# Patient Record
Sex: MALE | Race: WHITE | Employment: OTHER | URBAN - METROPOLITAN AREA
[De-identification: names, ages, dates, MRNs, and addresses within clinical notes are randomized per-mention and may not be internally consistent; named-entity substitution may affect disease eponyms.]

---

## 2019-02-09 PROBLEM — J44.9 COPD (CHRONIC OBSTRUCTIVE PULMONARY DISEASE) (HCC): Status: ACTIVE | Noted: 2019-02-09

## 2019-02-09 PROBLEM — M54.16 LUMBAR RADICULOPATHY: Status: ACTIVE | Noted: 2019-02-09

## 2019-03-11 PROBLEM — B19.20 HEPATITIS C WITHOUT HEPATIC COMA: Status: ACTIVE | Noted: 2019-03-11

## 2021-04-29 ENCOUNTER — TELEPHONE (OUTPATIENT)
Dept: ENT CLINIC | Age: 66
End: 2021-04-29

## 2021-04-29 NOTE — TELEPHONE ENCOUNTER
Patient was initially referred to Dr Francisca Tolbert by Dr Ally Briceno for an oral lesion but was unable to keep the appointment with them bc of transportation issues. He then spoke with his PCP Dr Lawrence Kirkpatrick and they advised him to contact Dr Donohue's office to schedule. Patient has oral lesion and is a current every day smoker. I scheduled first available but patient is requesting to be seen sooner. Please follow up with patient if his appointment can be moved. He did state that he had to go to store to purchase minutes for his phone so if he is unable to be reached please keep attempting.

## 2021-05-26 ENCOUNTER — OFFICE VISIT (OUTPATIENT)
Dept: ENT CLINIC | Age: 66
End: 2021-05-26
Payer: MEDICARE

## 2021-05-26 VITALS
DIASTOLIC BLOOD PRESSURE: 76 MMHG | HEIGHT: 67 IN | HEART RATE: 71 BPM | SYSTOLIC BLOOD PRESSURE: 134 MMHG | BODY MASS INDEX: 30.61 KG/M2 | WEIGHT: 195 LBS

## 2021-05-26 DIAGNOSIS — J38.3 LESION OF TRUE VOCAL CORD: ICD-10-CM

## 2021-05-26 DIAGNOSIS — K14.8 TONGUE LESION: Primary | ICD-10-CM

## 2021-05-26 PROCEDURE — 31575 DIAGNOSTIC LARYNGOSCOPY: CPT | Performed by: OTOLARYNGOLOGY

## 2021-05-26 PROCEDURE — 99205 OFFICE O/P NEW HI 60 MIN: CPT | Performed by: OTOLARYNGOLOGY

## 2021-05-26 ASSESSMENT — ENCOUNTER SYMPTOMS
SORE THROAT: 1
COLOR CHANGE: 0
GASTROINTESTINAL NEGATIVE: 1
EYES NEGATIVE: 1
EYE DISCHARGE: 0
EYE PAIN: 0
APNEA: 0
SHORTNESS OF BREATH: 0
ABDOMINAL PAIN: 0
VOMITING: 0
CHEST TIGHTNESS: 0
RESPIRATORY NEGATIVE: 1
DIARRHEA: 0

## 2021-05-26 NOTE — PROGRESS NOTES
Subjective:      Patient ID:  Heaven Lynn is a 72 y.o. male. HPI:    Patient presents today for left tongue lesion. Condition has been present for 1 year(s). Pt states all problems are on the left. He was found to have a swollen lymph node on the mandible on that side by Dr. Chtio Obrien. Pt has had 3 teeth removed and ABx but the lymph node is not resolved. No past medical history on file. No past surgical history on file. No family history on file. Social History     Socioeconomic History    Marital status: Single     Spouse name: None    Number of children: None    Years of education: None    Highest education level: None   Occupational History    None   Tobacco Use    Smoking status: Current Every Day Smoker     Packs/day: 0.50     Years: 45.00     Pack years: 22.50    Smokeless tobacco: Never Used   Substance and Sexual Activity    Alcohol use: None    Drug use: None    Sexual activity: None   Other Topics Concern    None   Social History Narrative    None     Social Determinants of Health     Financial Resource Strain:     Difficulty of Paying Living Expenses:    Food Insecurity:     Worried About Running Out of Food in the Last Year:     Ran Out of Food in the Last Year:    Transportation Needs:     Lack of Transportation (Medical):  Lack of Transportation (Non-Medical):    Physical Activity:     Days of Exercise per Week:     Minutes of Exercise per Session:    Stress:     Feeling of Stress :    Social Connections:     Frequency of Communication with Friends and Family:     Frequency of Social Gatherings with Friends and Family:     Attends Congregation Services:     Active Member of Clubs or Organizations:     Attends Club or Organization Meetings:     Marital Status:    Intimate Partner Violence:     Fear of Current or Ex-Partner:     Emotionally Abused:     Physically Abused:     Sexually Abused:       Allergies   Allergen Reactions    No Known Allergies Review of Systems   Constitutional: Negative. Negative for appetite change. HENT: Positive for mouth sores and sore throat. Eyes: Negative. Negative for pain, discharge and visual disturbance. Respiratory: Negative. Negative for apnea, chest tightness and shortness of breath. Cardiovascular: Negative. Negative for chest pain, palpitations and leg swelling. Gastrointestinal: Negative. Negative for abdominal pain, diarrhea and vomiting. Endocrine: Negative for cold intolerance, heat intolerance and polydipsia. Genitourinary: Negative. Negative for dysuria, flank pain and hematuria. Musculoskeletal: Negative. Negative for arthralgias, gait problem and neck pain. Skin: Negative. Negative for color change, pallor and rash. Allergic/Immunologic: Negative for environmental allergies, food allergies and immunocompromised state. Neurological: Negative. Negative for dizziness, numbness and headaches. Hematological: Positive for adenopathy. Psychiatric/Behavioral: Negative. Negative for behavioral problems and hallucinations. All other systems reviewed and are negative. Objective:     Vitals:    05/26/21 1559   BP: 134/76   Pulse: 71     Physical Exam  Vitals and nursing note reviewed. Constitutional:       Appearance: He is well-developed. HENT:      Head: Normocephalic and atraumatic. Right Ear: Hearing, tympanic membrane, ear canal and external ear normal.      Left Ear: Hearing, tympanic membrane, ear canal and external ear normal.      Nose: Septal deviation present. Comments: Severe DNS right 90%    Level ii lymphadenopathy left neck     Mouth/Throat:      Lips: Pink. Mouth: Mucous membranes are moist.        Comments: 2.5cm hard immobile area of the tongue, not exophytic    Pt has limited opening of the mandible 3-4 mm    Eyes:      Conjunctiva/sclera: Conjunctivae normal.      Pupils: Pupils are equal, round, and reactive to light. Cardiovascular:      Rate and Rhythm: Normal rate and regular rhythm. Heart sounds: Normal heart sounds. Pulmonary:      Effort: Pulmonary effort is normal.      Breath sounds: Normal breath sounds. Abdominal:      General: Bowel sounds are normal.      Palpations: Abdomen is soft. Musculoskeletal:      Cervical back: Normal range of motion and neck supple. Skin:     General: Skin is warm and dry. Neurological:      Mental Status: He is alert and oriented to person, place, and time. Endoscopy Procedure Note    Pre-operative Diagnosis: hoarseness and tobacco abuse    Post-operative Diagnosis: same    Indications: Hoarseness, dysphagia or aspiration - not able to be clearly evaluated by indirect laryngoscopy    Anesthesia: Lidocaine 2% and Vincenzo-Synephrine 1/2%    Endoscopy Type:  nasal endoscopy, nasopharyngoscopy, laryngoscopy    Procedure Details   With the patient sitting upright in the examining chair informed consent was obtained. The bilateral side(s) of the nose were topically anesthetized with spray. After waiting an appropriate period of time for anesthesia/ vasoconstriction to become effective, the 0-degree  flexible laryngoscope was passed through the left side(s) of the nose, and the nose, nasopharynx, oropharynx, hypopharynx and larynx were examined. An identical procedure was performed on the contralateral side. Examination was performed during quiet respiration and with phonation. I was present for the entire procedure. The following findings were noted. Findings:  Mucosa:  pale and boggy and erythematous   Nasal septum:  deviated to right   Turbinates:  pale, swollen, edematous   Adenoid:  normal   Eustachian tubes:  normal   Mucous stranding:  present   Lesions:  present   Modified Valladares's Maneuver not indicated   Larynx Lesion of left true vocal cord noted.        Condition:  Stable    Complications:  None    Pictures:                                    Assessment: Diagnosis Orders   1. Tongue lesion  CT SOFT TISSUE NECK W WO CONTRAST    BUN & Creatinine   2. Lesion of true vocal cord                Plan:    CT neck today. I recommend:    Panendoscopy, with esophagoscopy, bronchoscopy and direct laryngoscopy, microsuspension with biposy of tongue base and left vocal cord. ; Frozen section    The procedure risks and benefits were discussed with the patient and family. Pt and family understood and decided to proceed with the surgery. Surgical risks include:     -- Tearing of tissues is the most common problem. This is most common in esophagoscopy using a rigid scope. Tearing of the mucosa of the esophagus or hypopharynx can cause mediastinitus or a severe infection in the chest. Injury to the vocal cords can occur with laryngosopy as can injury to the lungs during bronchoscopy. Lung injury can cause air to leak into the chest cavity. If severe it can fill the chest cavity and compress the lungs producing a tension pneumothorax. Insertion of a chest tube would be emergently required to treat the patient.    - injury to lips, teeth or tongue         Follow up 1 week after surgery

## 2021-05-26 NOTE — PATIENT INSTRUCTIONS
Thank you for choosing our [facility]. We are committed to your medical treatment and  care. If you need to reschedule or cancel your surgery or follow up  appointment, please call the surgery scheduler at [phone]. INSTRUCTIONS FOR SURGERY Panendoscopy,Esophagoscopy,Bronchoscopy,Microsuspension,biopsy tongue base left vocal cord    Nothing to eat or drink after midnight the night before surgery unless surgery is at [facility] or otherwise instructed by the hospital.    DO NOT TAKE ANY ASPIRIN PRODUCTS 7 days prior to surgery-unless required by your cardiologist or primary care physician. Tylenol only. No Advil, Motrin, Aleve, or Ibuprofen    Any illegal drugs in your system (including Marijuana even if legally prescribed) will result in your surgery being cancelled. Please be sure to check with our office or the hospital on time frame for the drugs to be out of your system. Should your insurance change at any time you must contact our office. Failure to do so may result in your surgery being rescheduled. If you need paperwork filled out for work, you must give the office 2 weeks to complete and submit the forms.       [address], [address] [facility] [address] [place] [address] will call you a couple days prior to your surgery and give you further instructions, if any questions call them at [phone]

## 2021-05-28 ENCOUNTER — TELEPHONE (OUTPATIENT)
Dept: ENT CLINIC | Age: 66
End: 2021-05-28

## 2021-05-28 NOTE — TELEPHONE ENCOUNTER
[facility] to authorize order with patient insurance. Patient is scheduled for neck CT with radiology on 6/8/21 @ 3:00pm. Patient has been notified of date and time and that they need to arrive at 2:30pm. Patient was informed he needs to be NPO 4 hours prior to procedure and needs have his labs drawn prior to that day. Patient instructed to park in [facility] parking lot and report to registration. Patient verbalized understanding.     Electronically signed by Rita Mota on 5/28/21 at 10:28 AM EDT

## 2021-06-07 ENCOUNTER — TELEPHONE (OUTPATIENT)
Dept: ADMINISTRATIVE | Age: 66
End: 2021-06-07

## 2021-06-07 NOTE — TELEPHONE ENCOUNTER
pt needs to speak with office, unable to make his surgery appt due to transportation and wants to make it for another day

## 2021-06-07 NOTE — TELEPHONE ENCOUNTER
Called patient in regards to scheduling surgery, patient states due to transportation unable to keep surgery date.

## 2021-06-10 ENCOUNTER — TELEPHONE (OUTPATIENT)
Dept: ENT CLINIC | Age: 66
End: 2021-06-10

## 2021-06-10 NOTE — TELEPHONE ENCOUNTER
PIO BOSWELL for patient due to no-show to scheduled neck CT scan appointment 6/8/21. Patient still has follow up with Dr. Lucero Woods scheduled for 6/24/21. Return call requested. Radiology scheduling's phone number given to patient.     Electronically signed by Rita Kramer on 6/10/21 at 10:27 AM EDT

## 2021-06-17 ENCOUNTER — TELEPHONE (OUTPATIENT)
Dept: ENT CLINIC | Age: 66
End: 2021-06-17

## 2021-06-17 ENCOUNTER — TELEPHONE (OUTPATIENT)
Dept: ADMINISTRATIVE | Age: 66
End: 2021-06-17

## 2021-06-17 NOTE — TELEPHONE ENCOUNTER
MA spoke with patient. Follow up with Dr. Mirta Correa has been rescheduled to 7/6/21.     Electronically signed by Romana Goodwill, [address] [place], [place] [address] on 6/17/21 at 11:20 AM EDT

## 2021-06-17 NOTE — TELEPHONE ENCOUNTER
Patient needs to r/s appointment with Alex Joe for CT results as he will be getting his CT on 6/27.  Please advise for scheduling,

## 2021-06-17 NOTE — TELEPHONE ENCOUNTER
MA spoke with patient regarding CT scan appointment that he no-showed to. Patient states his phone doesn't show him all the voicemails left and that's why he wasn't aware of his neck CT appt 6/8/21. I gave patient radiology scheduling's phone number to reschedule his CT scan and told him to call office back if he is unable to get his appointment scheduled before his follow up appointment with Dr. Webber Fret 6/24/21. Patient gave verbal understanding.      Electronically signed by Angeli Hearn [address] [place] [address] [place] on 6/17/21 at 10:37 AM EDT

## 2021-06-26 ENCOUNTER — HOSPITAL ENCOUNTER (OUTPATIENT)
Age: 66
Discharge: HOME | End: 2021-06-26
Payer: MEDICARE

## 2021-06-26 DIAGNOSIS — K14.8 TONGUE LESION: ICD-10-CM

## 2021-06-26 LAB
BUN BLDV-MCNC: 12 MG/DL (ref 6–23)
CREAT SERPL-MCNC: 0.9 MG/DL (ref 0.7–1.2)
GFR AFRICAN AMERICAN: >60
GFR NON-AFRICAN AMERICAN: >60 ML/MIN/1.73

## 2021-06-26 PROCEDURE — 84520 ASSAY OF UREA NITROGEN: CPT

## 2021-06-26 PROCEDURE — 36415 COLL VENOUS BLD VENIPUNCTURE: CPT

## 2021-06-26 PROCEDURE — 82565 ASSAY OF CREATININE: CPT

## 2021-06-27 ENCOUNTER — HOSPITAL ENCOUNTER (OUTPATIENT)
Dept: CT IMAGING | Age: 66
Discharge: HOME | End: 2021-06-29
Payer: MEDICARE

## 2021-06-27 DIAGNOSIS — K14.8 TONGUE LESION: ICD-10-CM

## 2021-06-27 PROCEDURE — 70492 CT SFT TSUE NCK W/O & W/DYE: CPT

## 2021-06-27 PROCEDURE — 6360000004 HC RX CONTRAST MEDICATION: Performed by: RADIOLOGY

## 2021-06-27 RX ADMIN — IOPAMIDOL 75 ML: 755 INJECTION, SOLUTION INTRAVENOUS at 07:53

## 2021-07-06 ENCOUNTER — TELEPHONE (OUTPATIENT)
Dept: ENT CLINIC | Age: 66
End: 2021-07-06

## 2021-07-06 NOTE — TELEPHONE ENCOUNTER
Patient was unable to make the appointment with Dr Cazares Links today. He is requesting to reschedule his appointment for CT results. No soon appointment available. Please contact patient to reschedule.

## 2021-07-06 NOTE — TELEPHONE ENCOUNTER
Spoke with patient, scheduled for 7/13/21 at 10:00 am with Dr. Vielka Adams.      Electronically signed by Henrik Moreira MA on 7/6/21 at 9:19 AM EDT

## 2021-07-06 NOTE — TELEPHONE ENCOUNTER
Pt returned call, stated phone call had been dropped while discussing r/s today's appt. No new appt noted.  Please call pt back with appt info [phone]

## 2021-07-07 ENCOUNTER — TELEPHONE (OUTPATIENT)
Dept: ENT CLINIC | Age: 66
End: 2021-07-07

## 2021-07-07 NOTE — TELEPHONE ENCOUNTER
LVM for patient to see if he would like to move appointment up to 7/9/21 at 8:15 am with Dr. Fabien Ji instead of his scheduled appointment on 7/13/21.     Electronically signed by Lalo Tom MA on 7/7/21 at 2:23 PM EDT

## 2021-07-09 ENCOUNTER — OFFICE VISIT (OUTPATIENT)
Dept: ENT CLINIC | Age: 66
End: 2021-07-09
Payer: MEDICARE

## 2021-07-09 VITALS
SYSTOLIC BLOOD PRESSURE: 153 MMHG | DIASTOLIC BLOOD PRESSURE: 88 MMHG | WEIGHT: 195 LBS | HEIGHT: 67 IN | HEART RATE: 48 BPM | BODY MASS INDEX: 30.61 KG/M2

## 2021-07-09 DIAGNOSIS — G89.3 PAIN DUE TO NEOPLASM: ICD-10-CM

## 2021-07-09 DIAGNOSIS — J38.3 LESION OF TRUE VOCAL CORD: ICD-10-CM

## 2021-07-09 DIAGNOSIS — K14.8 TONGUE LESION: Primary | ICD-10-CM

## 2021-07-09 PROCEDURE — 99213 OFFICE O/P EST LOW 20 MIN: CPT | Performed by: OTOLARYNGOLOGY

## 2021-07-09 RX ORDER — OXYCODONE AND ACETAMINOPHEN 7.5; 325 MG/1; MG/1
1 TABLET ORAL EVERY 6 HOURS PRN
Qty: 30 TABLET | Refills: 0 | Status: SHIPPED | OUTPATIENT
Start: 2021-07-09 | End: 2021-07-23

## 2021-07-09 RX ORDER — IBUPROFEN 600 MG/1
TABLET ORAL
COMMUNITY
Start: 2021-07-08 | End: 2021-10-28

## 2021-07-09 ASSESSMENT — ENCOUNTER SYMPTOMS
RESPIRATORY NEGATIVE: 1
EYE DISCHARGE: 0
EYE PAIN: 0
EYES NEGATIVE: 1
DIARRHEA: 0
COLOR CHANGE: 0
VOMITING: 0
SORE THROAT: 1
APNEA: 0
SHORTNESS OF BREATH: 0
GASTROINTESTINAL NEGATIVE: 1
CHEST TIGHTNESS: 0
ABDOMINAL PAIN: 0

## 2021-07-09 NOTE — PROGRESS NOTES
Subjective:      Patient ID:  Natacha Pemberton is a 72 y.o. male. HPI:    Patient presents today for left tongue lesion. Condition has been present for 2 year(s). Pt states all problems are on the left. He was found to have a swollen lymph node on the mandible on that side by Dr. Eliz Adan. Pt has had 3 teeth removed and ABx but the lymph node is not resolved. He has been loosing weight ~10lbs for the past 1.5 months. Complains of nasal congestion, most of the time. Able to swallow, but endorses odynophagia. Has 45 pack year to tobacco abuse. 1 Large can of beer per day. Left ear discomfort. Hoarse for 2 months. No past medical history on file. No past surgical history on file. No family history on file. Social History     Socioeconomic History    Marital status: Single     Spouse name: None    Number of children: None    Years of education: None    Highest education level: None   Occupational History    None   Tobacco Use    Smoking status: Current Every Day Smoker     Packs/day: 0.50     Years: 45.00     Pack years: 22.50    Smokeless tobacco: Never Used   Substance and Sexual Activity    Alcohol use: None    Drug use: None    Sexual activity: None   Other Topics Concern    None   Social History Narrative    None     Social Determinants of Health     Financial Resource Strain:     Difficulty of Paying Living Expenses:    Food Insecurity:     Worried About Running Out of Food in the Last Year:     Ran Out of Food in the Last Year:    Transportation Needs:     Lack of Transportation (Medical):      Lack of Transportation (Non-Medical):    Physical Activity:     Days of Exercise per Week:     Minutes of Exercise per Session:    Stress:     Feeling of Stress :    Social Connections:     Frequency of Communication with Friends and Family:     Frequency of Social Gatherings with Friends and Family:     Attends Cheondoism Services:     Active Member of Clubs or Organizations:     Attends Club or Organization Meetings:     Marital Status:    Intimate Partner Violence:     Fear of Current or Ex-Partner:     Emotionally Abused:     Physically Abused:     Sexually Abused: Allergies   Allergen Reactions    No Known Allergies        Review of Systems   Constitutional: Negative. Negative for appetite change. HENT: Positive for mouth sores and sore throat. Eyes: Negative. Negative for pain, discharge and visual disturbance. Respiratory: Negative. Negative for apnea, chest tightness and shortness of breath. Cardiovascular: Negative. Negative for chest pain, palpitations and leg swelling. Gastrointestinal: Negative. Negative for abdominal pain, diarrhea and vomiting. Endocrine: Negative for cold intolerance, heat intolerance and polydipsia. Genitourinary: Negative. Negative for dysuria, flank pain and hematuria. Musculoskeletal: Negative. Negative for arthralgias, gait problem and neck pain. Skin: Negative. Negative for color change, pallor and rash. Allergic/Immunologic: Negative for environmental allergies, food allergies and immunocompromised state. Neurological: Negative. Negative for dizziness, numbness and headaches. Hematological: Positive for adenopathy. Psychiatric/Behavioral: Negative. Negative for behavioral problems and hallucinations. All other systems reviewed and are negative. Objective:     Vitals:    07/09/21 0821   BP: (!) 153/88   Pulse: (!) 48     Physical Exam  Vitals and nursing note reviewed. Constitutional:       Appearance: He is well-developed. HENT:      Head: Normocephalic and atraumatic. Right Ear: Hearing, tympanic membrane, ear canal and external ear normal.      Left Ear: Hearing, tympanic membrane, ear canal and external ear normal.      Nose: Septal deviation present. Comments: Severe DNS right 90%    Level ii lymphadenopathy left neck     Mouth/Throat:      Lips: Pink.       Mouth: Mucous membranes are moist.        Comments: 3-4cm hard immobile area of the left posterior lateral tongue, endophytic involving posterior midline of tongue     Pt has limited opening of the mandible 3-4 mm    Eyes:      Conjunctiva/sclera: Conjunctivae normal.      Pupils: Pupils are equal, round, and reactive to light. Neck:     Cardiovascular:      Rate and Rhythm: Normal rate and regular rhythm. Heart sounds: Normal heart sounds. Pulmonary:      Effort: Pulmonary effort is normal.      Breath sounds: Normal breath sounds. Abdominal:      General: Bowel sounds are normal.      Palpations: Abdomen is soft. Musculoskeletal:      Cervical back: Normal range of motion and neck supple. Skin:     General: Skin is warm and dry. Neurological:      Mental Status: He is alert and oriented to person, place, and time. Endoscopy Procedure Note    Pre-operative Diagnosis: hoarseness and tobacco abuse    Post-operative Diagnosis: same    Indications: Hoarseness, dysphagia or aspiration - not able to be clearly evaluated by indirect laryngoscopy    Anesthesia: Lidocaine 2% and Vinecnzo-Synephrine 1/2%    Endoscopy Type:  nasal endoscopy, nasopharyngoscopy, laryngoscopy    Procedure Details   With the patient sitting upright in the examining chair informed consent was obtained. The bilateral side(s) of the nose were topically anesthetized with spray. After waiting an appropriate period of time for anesthesia/ vasoconstriction to become effective, the 0-degree  flexible laryngoscope was passed through the left side(s) of the nose, and the nose, nasopharynx, oropharynx, hypopharynx and larynx were examined. An identical procedure was performed on the contralateral side. Examination was performed during quiet respiration and with phonation. I was present for the entire procedure. The following findings were noted.     Findings:  Mucosa:  pale and boggy and erythematous   Nasal septum:  deviated to right Turbinates:  pale, swollen, edematous   Adenoid:  normal   Eustachian tubes:  normal   Mucous stranding:  present   Lesions:  present   Modified Valladares's Maneuver not indicated   Larynx Lesion of left true vocal cord noted. Condition:  Stable    Complications:  None    Pictures:                                    Assessment:       Diagnosis Orders   1. Tongue lesion     2. 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 Lesion of true vocal cord                Plan:    CT neck reviewed with patient    I recommend:    Panendoscopy, with esophagoscopy, bronchoscopy and direct laryngoscopy, microsuspension with biposy of tongue base and left vocal cord. ; Frozen section    The procedure risks and benefits were discussed with the patient and family. Pt and family understood and decided to proceed with the surgery. Surgical risks include:     -- Tearing of tissues is the most common problem. This is most common in esophagoscopy using a rigid scope. Tearing of the mucosa of the esophagus or hypopharynx can cause mediastinitus or a severe infection in the chest. Injury to the vocal cords can occur with laryngosopy as can injury to the lungs during bronchoscopy. Lung injury can cause air to leak into the chest cavity. If severe it can fill the chest cavity and compress the lungs producing a tension pneumothorax. Insertion of a chest tube would be emergently required to treat the patient. - injury to lips, teeth or tongue         Follow up 1 week after surgery                     Lauri Lin  1955    I have discussed the case, including pertinent history and exam findings with the resident. I have seen and examined the patient and the key elements of the encounter have been performed by me. I agree with the assessment, plan and orders as documented by the  resident              Remainder of medical problems as per  resident note. Patient seen and examined. Agree with above exam, assessment and plan.       Electronically signed by Bentley Llanes DO on 7/11/21 at 12:06 PM EDT

## 2021-07-10 ENCOUNTER — ANESTHESIA EVENT (OUTPATIENT)
Dept: OPERATING ROOM | Age: 66
End: 2021-07-10
Payer: MEDICARE

## 2021-07-11 ASSESSMENT — ENCOUNTER SYMPTOMS
EYE PAIN: 0
RESPIRATORY NEGATIVE: 1
SORE THROAT: 1
SHORTNESS OF BREATH: 0
ABDOMINAL PAIN: 0
CHEST TIGHTNESS: 0
GASTROINTESTINAL NEGATIVE: 1
EYE DISCHARGE: 0
APNEA: 0
EYES NEGATIVE: 1
VOMITING: 0
COLOR CHANGE: 0
DIARRHEA: 0

## 2021-07-11 NOTE — H&P
Subjective:      Patient ID:  Mikhail Wagner is a 72 y.o. male. HPI:    Patient presents today for left tongue lesion. Condition has been present for 2 year(s). Pt states all problems are on the left. He was found to have a swollen lymph node on the mandible on that side by Dr. Dell Tee. Pt has had 3 teeth removed and ABx but the lymph node is not resolved. He has been loosing weight ~10lbs for the past 1.5 months. Complains of nasal congestion, most of the time. Able to swallow, but endorses odynophagia. Has 45 pack year to tobacco abuse. 1 Large can of beer per day. Left ear discomfort. Hoarse for 2 months. Past Medical History:   Diagnosis Date    COPD (chronic obstructive [facility] disease) ([facility]     History of hepatitis C 2019    treated-2019    Hoarseness of voice     For OR 7-12-21    Mouth sores     Tongue mass     left     Past Surgical History:   Procedure Laterality Date    FRACTURE SURGERY      KNEE ARTHROSCOPY Left 1980     History reviewed. No pertinent family history. Social History     Socioeconomic History    Marital status: Single     Spouse name: None    Number of children: None    Years of education: None    Highest education level: None   Occupational History    None   Tobacco Use    Smoking status: Current Every Day Smoker     Packs/day: 0.50     Years: 45.00     Pack years: 22.50     Types: Cigarettes    Smokeless tobacco: Never Used   Substance and Sexual Activity    Alcohol use:  Yes     Alcohol/week: 1.0 standard drinks     Types: 1 Cans of beer per week     Comment: i beer/day    Drug use: Never    Sexual activity: None   Other Topics Concern    None   Social History Narrative    None     Social Determinants of Health     Financial Resource Strain:     Difficulty of Paying Living Expenses:    Food Insecurity:     Worried About Running Out of Food in the Last Year:     Ran Out of Food in the Last Year:    Transportation Needs:     Lack of Transportation (Medical):  Lack of Transportation (Non-Medical):    Physical Activity:     Days of Exercise per Week:     Minutes of Exercise per Session:    Stress:     Feeling of Stress :    Social Connections:     Frequency of Communication with Friends and Family:     Frequency of Social Gatherings with Friends and Family:     Attends Religion Services:     Active Member of Clubs or Organizations:     Attends Club or Organization Meetings:     Marital Status:    Intimate Partner Violence:     Fear of Current or Ex-Partner:     Emotionally Abused:     Physically Abused:     Sexually Abused:      No Known Allergies    Review of Systems   Constitutional: Negative. Negative for appetite change. HENT: Positive for mouth sores and sore throat. Eyes: Negative. Negative for pain, discharge and visual disturbance. Respiratory: Negative. Negative for apnea, chest tightness and shortness of breath. Cardiovascular: Negative. Negative for chest pain, palpitations and leg swelling. Gastrointestinal: Negative. Negative for abdominal pain, diarrhea and vomiting. Endocrine: Negative for cold intolerance, heat intolerance and polydipsia. Genitourinary: Negative. Negative for dysuria, flank pain and hematuria. Musculoskeletal: Negative. Negative for arthralgias, gait problem and neck pain. Skin: Negative. Negative for color change, pallor and rash. Allergic/Immunologic: Negative for environmental allergies, food allergies and immunocompromised state. Neurological: Negative. Negative for dizziness, numbness and headaches. Hematological: Positive for adenopathy. Psychiatric/Behavioral: Negative. Negative for behavioral problems and hallucinations. All other systems reviewed and are negative. Objective: There were no vitals filed for this visit. Physical Exam  Vitals and nursing note reviewed. Constitutional:       Appearance: He is well-developed.    HENT:      Head: hypopharynx and larynx were examined. An identical procedure was performed on the contralateral side. Examination was performed during quiet respiration and with phonation. I was present for the entire procedure. The following findings were noted. Findings:  Mucosa:  pale and boggy and erythematous   Nasal septum:  deviated to right   Turbinates:  pale, swollen, edematous   Adenoid:  normal   Eustachian tubes:  normal   Mucous stranding:  present   Lesions:  present   Modified Valladares's Maneuver not indicated   Larynx Lesion of left true vocal cord noted. Condition:  Stable    Complications:  None    Pictures:                                    Assessment:       Diagnosis Orders   1. Tongue lesion     2. 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 Lesion of true vocal cord                Plan:    CT neck reviewed with patient    I recommend:    Panendoscopy, with esophagoscopy, bronchoscopy and direct laryngoscopy, microsuspension with biposy of tongue base and left vocal cord. ; Frozen section    The procedure risks and benefits were discussed with the patient and family. Pt and family understood and decided to proceed with the surgery. Surgical risks include:     -- Tearing of tissues is the most common problem. This is most common in esophagoscopy using a rigid scope. Tearing of the mucosa of the esophagus or hypopharynx can cause mediastinitus or a severe infection in the chest. Injury to the vocal cords can occur with laryngosopy as can injury to the lungs during bronchoscopy. Lung injury can cause air to leak into the chest cavity. If severe it can fill the chest cavity and compress the lungs producing a tension pneumothorax. Insertion of a chest tube would be emergently required to treat the patient. - injury to lips, teeth or tongue         Follow up 1 week after surgery           Patient was seen and re-examined preoperatively today, July 12 2021. There was no substantial change in her physical and medical status. Patient is fit for the proposed surgical procedure. All questions were appropriately addressed and had no further questions regarding the risks, benefits, and alternatives of the procedure. Patient and family wished to proceed.     Tisha Hairston DO  Resident Physician  [facility]  Otolaryngology Residency

## 2021-07-12 ENCOUNTER — HOSPITAL ENCOUNTER (OUTPATIENT)
Age: 66
Setting detail: OUTPATIENT SURGERY
Discharge: HOME | End: 2021-07-12
Attending: OTOLARYNGOLOGY | Admitting: OTOLARYNGOLOGY
Payer: MEDICARE

## 2021-07-12 ENCOUNTER — ANESTHESIA (OUTPATIENT)
Dept: OPERATING ROOM | Age: 66
End: 2021-07-12
Payer: MEDICARE

## 2021-07-12 VITALS
WEIGHT: 195 LBS | HEART RATE: 74 BPM | OXYGEN SATURATION: 92 % | RESPIRATION RATE: 18 BRPM | DIASTOLIC BLOOD PRESSURE: 97 MMHG | BODY MASS INDEX: 30.61 KG/M2 | SYSTOLIC BLOOD PRESSURE: 171 MMHG | TEMPERATURE: 96.7 F | HEIGHT: 67 IN

## 2021-07-12 VITALS — OXYGEN SATURATION: 95 % | TEMPERATURE: 94.3 F | DIASTOLIC BLOOD PRESSURE: 77 MMHG | SYSTOLIC BLOOD PRESSURE: 161 MMHG

## 2021-07-12 DIAGNOSIS — C02.9 TONGUE CANCER (HCC): Primary | ICD-10-CM

## 2021-07-12 PROBLEM — K14.8 TONGUE MASS: Status: ACTIVE | Noted: 2021-07-12

## 2021-07-12 LAB
EKG ATRIAL RATE: 57 BPM
EKG P AXIS: 15 DEGREES
EKG P-R INTERVAL: 154 MS
EKG Q-T INTERVAL: 416 MS
EKG QRS DURATION: 86 MS
EKG QTC CALCULATION (BAZETT): 404 MS
EKG R AXIS: 43 DEGREES
EKG T AXIS: 67 DEGREES
EKG VENTRICULAR RATE: 57 BPM
SARS-COV-2, NAAT: NOT DETECTED

## 2021-07-12 PROCEDURE — 88342 IMHCHEM/IMCYTCHM 1ST ANTB: CPT

## 2021-07-12 PROCEDURE — 6370000000 HC RX 637 (ALT 250 FOR IP): Performed by: ANESTHESIOLOGY

## 2021-07-12 PROCEDURE — 7100000000 HC PACU RECOVERY - FIRST 15 MIN: Performed by: OTOLARYNGOLOGY

## 2021-07-12 PROCEDURE — 93005 ELECTROCARDIOGRAM TRACING: CPT

## 2021-07-12 PROCEDURE — 88331 PATH CONSLTJ SURG 1 BLK 1SPC: CPT

## 2021-07-12 PROCEDURE — 3600000002 HC SURGERY LEVEL 2 BASE: Performed by: OTOLARYNGOLOGY

## 2021-07-12 PROCEDURE — 3700000000 HC ANESTHESIA ATTENDED CARE: Performed by: OTOLARYNGOLOGY

## 2021-07-12 PROCEDURE — 31535 LARYNGOSCOPY W/BIOPSY: CPT | Performed by: OTOLARYNGOLOGY

## 2021-07-12 PROCEDURE — 7100000001 HC PACU RECOVERY - ADDTL 15 MIN: Performed by: OTOLARYNGOLOGY

## 2021-07-12 PROCEDURE — 87635 SARS-COV-2 COVID-19 AMP PRB: CPT

## 2021-07-12 PROCEDURE — 7100000011 HC PHASE II RECOVERY - ADDTL 15 MIN: Performed by: OTOLARYNGOLOGY

## 2021-07-12 PROCEDURE — 31622 DX BRONCHOSCOPE/WASH: CPT | Performed by: OTOLARYNGOLOGY

## 2021-07-12 PROCEDURE — 3700000001 HC ADD 15 MINUTES (ANESTHESIA): Performed by: OTOLARYNGOLOGY

## 2021-07-12 PROCEDURE — 2720000010 HC SURG SUPPLY STERILE: Performed by: OTOLARYNGOLOGY

## 2021-07-12 PROCEDURE — 2580000003 HC RX 258: Performed by: STUDENT IN AN ORGANIZED HEALTH CARE EDUCATION/TRAINING PROGRAM

## 2021-07-12 PROCEDURE — 6360000002 HC RX W HCPCS: Performed by: NURSE ANESTHETIST, CERTIFIED REGISTERED

## 2021-07-12 PROCEDURE — 2500000003 HC RX 250 WO HCPCS: Performed by: NURSE ANESTHETIST, CERTIFIED REGISTERED

## 2021-07-12 PROCEDURE — 43191 ESOPHAGOSCOPY RIGID TRNSO DX: CPT | Performed by: OTOLARYNGOLOGY

## 2021-07-12 PROCEDURE — 2709999900 HC NON-CHARGEABLE SUPPLY: Performed by: OTOLARYNGOLOGY

## 2021-07-12 PROCEDURE — 88360 TUMOR IMMUNOHISTOCHEM/MANUAL: CPT

## 2021-07-12 PROCEDURE — 88305 TISSUE EXAM BY PATHOLOGIST: CPT

## 2021-07-12 PROCEDURE — 3600000012 HC SURGERY LEVEL 2 ADDTL 15MIN: Performed by: OTOLARYNGOLOGY

## 2021-07-12 PROCEDURE — 7100000010 HC PHASE II RECOVERY - FIRST 15 MIN: Performed by: OTOLARYNGOLOGY

## 2021-07-12 RX ORDER — ONDANSETRON 2 MG/ML
INJECTION INTRAMUSCULAR; INTRAVENOUS PRN
Status: DISCONTINUED | OUTPATIENT
Start: 2021-07-12 | End: 2021-07-12 | Stop reason: SDUPTHER

## 2021-07-12 RX ORDER — DEXAMETHASONE SODIUM PHOSPHATE 4 MG/ML
INJECTION, SOLUTION INTRA-ARTICULAR; INTRALESIONAL; INTRAMUSCULAR; INTRAVENOUS; SOFT TISSUE PRN
Status: DISCONTINUED | OUTPATIENT
Start: 2021-07-12 | End: 2021-07-12 | Stop reason: SDUPTHER

## 2021-07-12 RX ORDER — FENTANYL CITRATE 50 UG/ML
50 INJECTION, SOLUTION INTRAMUSCULAR; INTRAVENOUS EVERY 5 MIN PRN
Status: DISCONTINUED | OUTPATIENT
Start: 2021-07-12 | End: 2021-07-12 | Stop reason: HOSPADM

## 2021-07-12 RX ORDER — PROPOFOL 10 MG/ML
INJECTION, EMULSION INTRAVENOUS PRN
Status: DISCONTINUED | OUTPATIENT
Start: 2021-07-12 | End: 2021-07-12 | Stop reason: SDUPTHER

## 2021-07-12 RX ORDER — SUCCINYLCHOLINE/SOD CL,ISO/PF 200MG/10ML
SYRINGE (ML) INTRAVENOUS PRN
Status: DISCONTINUED | OUTPATIENT
Start: 2021-07-12 | End: 2021-07-12 | Stop reason: SDUPTHER

## 2021-07-12 RX ORDER — OXYCODONE HYDROCHLORIDE AND ACETAMINOPHEN 5; 325 MG/1; MG/1
1 TABLET ORAL
Status: COMPLETED | OUTPATIENT
Start: 2021-07-12 | End: 2021-07-12

## 2021-07-12 RX ORDER — SODIUM CHLORIDE 9 MG/ML
25 INJECTION, SOLUTION INTRAVENOUS PRN
Status: CANCELLED | OUTPATIENT
Start: 2021-07-12

## 2021-07-12 RX ORDER — SODIUM CHLORIDE 0.9 % (FLUSH) 0.9 %
10 SYRINGE (ML) INJECTION EVERY 12 HOURS SCHEDULED
Status: DISCONTINUED | OUTPATIENT
Start: 2021-07-12 | End: 2021-07-12 | Stop reason: HOSPADM

## 2021-07-12 RX ORDER — SODIUM CHLORIDE 0.9 % (FLUSH) 0.9 %
5-40 SYRINGE (ML) INJECTION EVERY 12 HOURS SCHEDULED
Status: CANCELLED | OUTPATIENT
Start: 2021-07-12

## 2021-07-12 RX ORDER — SODIUM CHLORIDE 0.9 % (FLUSH) 0.9 %
10 SYRINGE (ML) INJECTION PRN
Status: DISCONTINUED | OUTPATIENT
Start: 2021-07-12 | End: 2021-07-12 | Stop reason: HOSPADM

## 2021-07-12 RX ORDER — PROMETHAZINE HYDROCHLORIDE 25 MG/ML
6.25 INJECTION, SOLUTION INTRAMUSCULAR; INTRAVENOUS
Status: DISCONTINUED | OUTPATIENT
Start: 2021-07-12 | End: 2021-07-12 | Stop reason: HOSPADM

## 2021-07-12 RX ORDER — MEPERIDINE HYDROCHLORIDE 25 MG/ML
12.5 INJECTION INTRAMUSCULAR; INTRAVENOUS; SUBCUTANEOUS EVERY 5 MIN PRN
Status: DISCONTINUED | OUTPATIENT
Start: 2021-07-12 | End: 2021-07-12 | Stop reason: HOSPADM

## 2021-07-12 RX ORDER — LABETALOL HYDROCHLORIDE 5 MG/ML
INJECTION, SOLUTION INTRAVENOUS PRN
Status: DISCONTINUED | OUTPATIENT
Start: 2021-07-12 | End: 2021-07-12 | Stop reason: SDUPTHER

## 2021-07-12 RX ORDER — FENTANYL CITRATE 50 UG/ML
25 INJECTION, SOLUTION INTRAMUSCULAR; INTRAVENOUS EVERY 5 MIN PRN
Status: DISCONTINUED | OUTPATIENT
Start: 2021-07-12 | End: 2021-07-12 | Stop reason: HOSPADM

## 2021-07-12 RX ORDER — SODIUM CHLORIDE 9 MG/ML
25 INJECTION, SOLUTION INTRAVENOUS PRN
Status: DISCONTINUED | OUTPATIENT
Start: 2021-07-12 | End: 2021-07-12 | Stop reason: HOSPADM

## 2021-07-12 RX ORDER — ROCURONIUM BROMIDE 10 MG/ML
INJECTION, SOLUTION INTRAVENOUS PRN
Status: DISCONTINUED | OUTPATIENT
Start: 2021-07-12 | End: 2021-07-12 | Stop reason: SDUPTHER

## 2021-07-12 RX ORDER — LIDOCAINE HYDROCHLORIDE 20 MG/ML
INJECTION, SOLUTION EPIDURAL; INFILTRATION; INTRACAUDAL; PERINEURAL PRN
Status: DISCONTINUED | OUTPATIENT
Start: 2021-07-12 | End: 2021-07-12 | Stop reason: SDUPTHER

## 2021-07-12 RX ORDER — SODIUM CHLORIDE 9 MG/ML
INJECTION, SOLUTION INTRAVENOUS CONTINUOUS
Status: CANCELLED | OUTPATIENT
Start: 2021-07-12

## 2021-07-12 RX ORDER — SODIUM CHLORIDE 0.9 % (FLUSH) 0.9 %
5-40 SYRINGE (ML) INJECTION PRN
Status: CANCELLED | OUTPATIENT
Start: 2021-07-12

## 2021-07-12 RX ORDER — GLYCOPYRROLATE 1 MG/5 ML
SYRINGE (ML) INTRAVENOUS PRN
Status: DISCONTINUED | OUTPATIENT
Start: 2021-07-12 | End: 2021-07-12 | Stop reason: SDUPTHER

## 2021-07-12 RX ORDER — DIPHENHYDRAMINE HYDROCHLORIDE 50 MG/ML
12.5 INJECTION INTRAMUSCULAR; INTRAVENOUS
Status: DISCONTINUED | OUTPATIENT
Start: 2021-07-12 | End: 2021-07-12 | Stop reason: HOSPADM

## 2021-07-12 RX ORDER — CHLORHEXIDINE GLUCONATE 0.12 MG/ML
15 RINSE ORAL 2 TIMES DAILY
Qty: 420 ML | Refills: 0 | Status: SHIPPED | OUTPATIENT
Start: 2021-07-12 | End: 2021-07-23 | Stop reason: SDUPTHER

## 2021-07-12 RX ORDER — FENTANYL CITRATE 50 UG/ML
INJECTION, SOLUTION INTRAMUSCULAR; INTRAVENOUS PRN
Status: DISCONTINUED | OUTPATIENT
Start: 2021-07-12 | End: 2021-07-12 | Stop reason: SDUPTHER

## 2021-07-12 RX ADMIN — PROPOFOL 200 MG: 10 INJECTION, EMULSION INTRAVENOUS at 12:35

## 2021-07-12 RX ADMIN — SUGAMMADEX 350 MG: 100 INJECTION, SOLUTION INTRAVENOUS at 12:58

## 2021-07-12 RX ADMIN — LABETALOL HYDROCHLORIDE 15 MG: 5 INJECTION INTRAVENOUS at 12:48

## 2021-07-12 RX ADMIN — FENTANYL CITRATE 100 MCG: 50 INJECTION, SOLUTION INTRAMUSCULAR; INTRAVENOUS at 12:35

## 2021-07-12 RX ADMIN — LIDOCAINE HYDROCHLORIDE 100 MG: 20 INJECTION, SOLUTION EPIDURAL; INFILTRATION; INTRACAUDAL; PERINEURAL at 12:35

## 2021-07-12 RX ADMIN — SODIUM CHLORIDE: 9 INJECTION, SOLUTION INTRAVENOUS at 11:23

## 2021-07-12 RX ADMIN — LABETALOL HYDROCHLORIDE 10 MG: 5 INJECTION INTRAVENOUS at 12:52

## 2021-07-12 RX ADMIN — OXYCODONE HYDROCHLORIDE AND ACETAMINOPHEN 1 TABLET: 5; 325 TABLET ORAL at 14:05

## 2021-07-12 RX ADMIN — DEXAMETHASONE SODIUM PHOSPHATE 10 MG: 4 INJECTION, SOLUTION INTRAMUSCULAR; INTRAVENOUS at 12:42

## 2021-07-12 RX ADMIN — ONDANSETRON 4 MG: 2 INJECTION INTRAMUSCULAR; INTRAVENOUS at 12:42

## 2021-07-12 RX ADMIN — ROCURONIUM BROMIDE 20 MG: 10 INJECTION, SOLUTION INTRAVENOUS at 12:40

## 2021-07-12 RX ADMIN — Medication 140 MG: at 12:41

## 2021-07-12 RX ADMIN — LABETALOL HYDROCHLORIDE 10 MG: 5 INJECTION INTRAVENOUS at 12:41

## 2021-07-12 RX ADMIN — Medication 0.4 MG: at 12:57

## 2021-07-12 ASSESSMENT — PULMONARY FUNCTION TESTS
PIF_VALUE: 20
PIF_VALUE: 0
PIF_VALUE: 32
PIF_VALUE: 22
PIF_VALUE: 0
PIF_VALUE: 1
PIF_VALUE: 23
PIF_VALUE: 1
PIF_VALUE: 26
PIF_VALUE: 25
PIF_VALUE: 1
PIF_VALUE: 20
PIF_VALUE: 26
PIF_VALUE: 1
PIF_VALUE: 2
PIF_VALUE: 24
PIF_VALUE: 20
PIF_VALUE: 1
PIF_VALUE: 25
PIF_VALUE: 1
PIF_VALUE: 21
PIF_VALUE: 0
PIF_VALUE: 26
PIF_VALUE: 28
PIF_VALUE: 2
PIF_VALUE: 25
PIF_VALUE: 1
PIF_VALUE: 23
PIF_VALUE: 1
PIF_VALUE: 25
PIF_VALUE: 22
PIF_VALUE: 2
PIF_VALUE: 36
PIF_VALUE: 30
PIF_VALUE: 2
PIF_VALUE: 25
PIF_VALUE: 0
PIF_VALUE: 3
PIF_VALUE: 20
PIF_VALUE: 26
PIF_VALUE: 15
PIF_VALUE: 23
PIF_VALUE: 24
PIF_VALUE: 0
PIF_VALUE: 26
PIF_VALUE: 24
PIF_VALUE: 14
PIF_VALUE: 0
PIF_VALUE: 21
PIF_VALUE: 1
PIF_VALUE: 20
PIF_VALUE: 23
PIF_VALUE: 25
PIF_VALUE: 1
PIF_VALUE: 26
PIF_VALUE: 25
PIF_VALUE: 26
PIF_VALUE: 21
PIF_VALUE: 32

## 2021-07-12 ASSESSMENT — LIFESTYLE VARIABLES: SMOKING_STATUS: 1

## 2021-07-12 ASSESSMENT — PAIN SCALES - GENERAL
PAINLEVEL_OUTOF10: 8
PAINLEVEL_OUTOF10: 3

## 2021-07-12 ASSESSMENT — PAIN DESCRIPTION - FREQUENCY: FREQUENCY: CONTINUOUS

## 2021-07-12 ASSESSMENT — PAIN DESCRIPTION - PAIN TYPE
TYPE: ACUTE PAIN

## 2021-07-12 ASSESSMENT — PAIN DESCRIPTION - LOCATION
LOCATION: HEAD

## 2021-07-12 ASSESSMENT — PAIN DESCRIPTION - PROGRESSION
CLINICAL_PROGRESSION: NOT CHANGED
CLINICAL_PROGRESSION: NOT CHANGED

## 2021-07-12 ASSESSMENT — PAIN DESCRIPTION - DESCRIPTORS
DESCRIPTORS: HEADACHE;ACHING;THROBBING
DESCRIPTORS: HEADACHE

## 2021-07-12 NOTE — OP NOTE
7/12/2021  Darlene Colmenares  [id number]      Pre-operative Diagnosis: tongue mass, vocal cord mass      Post-operative Diagnosis: same    Procedure: Direct laryngoscopy, esophagoscopy, bronchoscopy,  microsuspension with biopsy and left true vocal cord    Anesthesia: General endotracheal anesthesia    Surgeons/Assistants: Charanjit    Estimated Blood Loss: less than 50     Complications: None    Specimens: was Obtained    DESCRIPTION OF PROCEDURE: The patient was consented preoperatively, taken   back to the operating room, identified, and appropriately placed in the   supine position. Given anesthesia per general intubation. Once the patient   was intubated, they were turned 90 degrees, and the bed was elevated to my hip   height. A tooth guard was not placed in the patient's   oral cavity to protect his upper teeth. Esophagoscopy  An esophagoscope was then placed in the patient's oral cavity down to his   posterior pharynx. The esophagoscope was placed into the esophageal sphincter   and allowed to go down to the inferior aspect of the esophagus. Upon going   into the esophagus, reflux was seen and suctioned away. The esophagoscope was placed down near the lower esophageal sphincter. Slowly moving back from the most inferior aspect of the esophagus, the esophageal tissue was examined. There was not  abnormal tissue found and a biopsy was not taken. A biopsy was not taken from the upper esophageal sphincter right at the opening of the esophagus. This was sent off for permanent pathology. The esophagoscope was removed. Direct laryngoscopy  A [employer] scope was placed in the patient's oral cavity. All areas of oropharynx and hypopharynx were evaluated starting with the right base of the tongue, left base of tongue right vallecula and left vallecula, then proceeding down to the left piriform sinus and left parapharyngeal space.  The scope was then moved to the right piriform sinus and parapharyngeal area.  The scope was then used to lift the posterior aspect of the epiglottis to evaluate the larynx, bilateral aryepiglottic folds, false vocal cords, anterior commissure, true vocal cords and subglottis. There were abnormalities found. Biopsies were taken of the left base of tongue and left true vocal cords  These were sent off for pathology. Bronchoscopy  A 0 degree rigid bronchoscope was then used to go [facility]   [facility] to evaluate below the subglottis. The patient's breathing tube cuff was deflated and the bronchoscope was inserted past the tube. The ashanti was seen clearly   without any masses. The trachea was evaluated fully, drawing back around the tube and masses were not seen subglottically. Biopsies were not taken. Once this was all accomplished,   some adrenaline pledgets were placed on the area where the biopsies were   taken to calm the bleeding down. These were then removed. The Holinger   scope was removed. The patient was then turned back to Anesthesia for appropriate awakening. The patient's oral cavity was examined after the tooth guard was removed and teeth were all intact.

## 2021-07-12 NOTE — ANESTHESIA POSTPROCEDURE EVALUATION
Department of Anesthesiology  Postprocedure Note    Patient: Hortensia Miller  MRN: [id number]  YOB: 1955  Date of evaluation: 7/12/2021  Time:  6:19 PM     Procedure Summary     Date: 07/12/21 Room / Location: [facility] 01 / 106 [facility]    Anesthesia Start: 1214 Anesthesia Stop: 9677    Procedures:       PANENDOSCOPY BRONCHOSCOPY ESOPHAGOSCOPY MICROSUSPENSION DIRECT LARYNGOSCOPY (N/A Throat)      BIOPSY OF TONGUE BASE WITH FROZEN SECTION AND LEFT TRUE VOCAL CORD BIOPSY (N/A Mouth) Diagnosis: (TONGUE LESION LESION OF TRUE VOCAL CORD)    Surgeons: Kirk Ellison DO Responsible Provider: Margarito Tomas MD    Anesthesia Type: general ASA Status: 3          Anesthesia Type: general    Julian Phase I: Julian Score: 9    Julian Phase II: Julian Score: 10    Last vitals: Reviewed and per EMR flowsheets.        Anesthesia Post Evaluation    Patient location during evaluation: PACU  Patient participation: complete - patient participated  Level of consciousness: awake  Airway patency: patent  Nausea & Vomiting: no vomiting and no nausea  Complications: no  Cardiovascular status: hemodynamically stable  Respiratory status: acceptable  Hydration status: stable

## 2021-07-12 NOTE — PROGRESS NOTES
INTRAOPERATIVE CONSULTATION (with FROZEN SECTION)    Left tongue base, biopsy: Invasive squamous cell carcinoma

## 2021-07-12 NOTE — ANESTHESIA PRE PROCEDURE
Department of Anesthesiology  Preprocedure Note       Name:  Ivette Lombardo   Age:  72 y.o.  :  1955                                          MRN:  [id number]         Date:  2021      Surgeon: Yang Lawler):  Omega Bland DO    Procedure: Procedure(s):  PANENDOSCOPY BRONCHOSCOPY ESOPHAGOSCOPY MICROSUSPENSION DIRECT LARYNGOSCOPY  BIOPSY OF TONGUE BASE AND LEFT VOCAL CORD WITH FROZEN SECTION   (PATHOLOGY NOTIFIED)    Medications prior to admission:   Prior to Admission medications    Medication Sig Start Date End Date Taking? Authorizing Provider   oxyCODONE-acetaminophen (PERCOCET) 7.5-325 MG per tablet Take 1 tablet by mouth every 6 hours as needed for Pain for up to 14 days. Intended supply: 30 days 21 Yes Gonzalo Peguero DO   ibuprofen (ADVIL;MOTRIN) 600 MG tablet  21  Yes Historical Provider, MD   diazePAM (VALIUM) 5 MG tablet Take 5 mg by mouth 2 times daily.   20  Yes Historical Provider, MD   albuterol sulfate  (90 Base) MCG/ACT inhaler INHALE 2 PUFFS INTO THE LUNGS EVERY 6 HOURS AS NEEDED FOR WHEEZING 20  Yes Patrick Allison DO   ADVAIR DISKUS 250-50 MCG/DOSE AEPB Inhale 1 puff into the lungs 2 times daily  19  Yes Historical Provider, MD       Current medications:    Current Facility-Administered Medications   Medication Dose Route Frequency Provider Last Rate Last Admin    sodium chloride flush 0.9 % injection 10 mL  10 mL Intravenous 2 times per day Gonzalo Jaquez DO        sodium chloride flush 0.9 % injection 10 mL  10 mL Intravenous PRN Melissa Nur, DO        0.9 % sodium chloride infusion  25 mL Intravenous PRN Melissa Nur, DO           Allergies:  No Known Allergies    Problem List:    Patient Active Problem List   Diagnosis Code    [facility] J[facility]    Lumbar radiculopathy M54.16    Hepatitis C without hepatic coma B19.20       Past Medical History:        Diagnosis Date    [facility]     History of hepatitis C 2019    treated-2019    Hoarseness of voice     For OR 7-12-21    Mouth sores     Tongue mass     left       Past Surgical History:        Procedure Laterality Date    FRACTURE SURGERY      KNEE ARTHROSCOPY Left 1980       Social History:    Social History     Tobacco Use    Smoking status: Current Every Day Smoker     Packs/day: 0.50     Years: 45.00     Pack years: 22.50     Types: Cigarettes    Smokeless tobacco: Never Used   Substance Use Topics    Alcohol use: Yes     Alcohol/week: 1.0 standard drinks     Types: 1 Cans of beer per week     Comment: i beer/day                                Ready to quit: Not Answered  Counseling given: Not Answered      Vital Signs (Current):   Vitals:    07/09/21 1253 07/12/21 1118   BP:  (!) 150/94   Pulse:  60   Resp:  14   Temp:  97.4 °F (36.3 °C)   TempSrc:  Temporal   SpO2:  94%   Weight: 195 lb (88.5 kg)    Height: 5' 7\" (1.702 m)                                               BP Readings from Last 3 Encounters:   07/12/21 (!) 150/94   07/09/21 (!) 153/88   05/26/21 134/76       NPO Status: Time of last liquid consumption: 2300                        Time of last solid consumption: 2300                        Date of last liquid consumption: 07/11/21                        Date of last solid food consumption: 07/11/21    BMI:   Wt Readings from Last 3 Encounters:   07/09/21 195 lb (88.5 kg)   07/09/21 195 lb (88.5 kg)   05/26/21 195 lb (88.5 kg)     Body mass index is 30.54 kg/m². CBC: No results found for: WBC, RBC, HGB, HCT, MCV, RDW, PLT    CMP:   Lab Results   Component Value Date    BUN 12 06/26/2021    CREATININE 0.9 06/26/2021    GFRAA >60 06/26/2021    LABGLOM >60 06/26/2021       POC Tests: No results for input(s): POCGLU, POCNA, POCK, POCCL, POCBUN, POCHEMO, POCHCT in the last 72 hours.     Coags: No results found for: PROTIME, INR, APTT    HCG (If Applicable): No results found for: PREGTESTUR, PREGSERUM, HCG, HCGQUANT     ABGs: No results found for: PHART, PO2ART, AJV8XSK, IJF8YWJ, BEART, S3ZRAFRP     Type & Screen (If Applicable):  No results found for: LABABO, LABRH    Drug/Infectious Status (If Applicable):  No results found for: HIV, HEPCAB    COVID-19 Screening (If Applicable):   Lab Results   Component Value Date    COVID19 Not Detected 07/12/2021           Anesthesia Evaluation  Patient summary reviewed no history of anesthetic complications:   Airway: Mallampati: III  TM distance: <3 FB     Mouth opening: < 3 FB Dental:          Pulmonary:   (+) COPD:  decreased breath sounds,  current smoker    (-) wheezes                           Cardiovascular:    (+) hypertension (preop BP 160s/90s; unclear whether he sees PCP;  no official diagnosis of HTN on chart ):,         Rhythm: regular  Rate: normal                    Neuro/Psych:   (+) neuromuscular disease:, psychiatric history:depression/anxiety             GI/Hepatic/Renal:   (+) hepatitis (treated in 2019): C,           Endo/Other: Negative Endo/Other ROS                    Abdominal:             Vascular: negative vascular ROS. Other Findings:           Anesthesia Plan      general     ASA 3     (Explained to patient that his loose and chipped teeth are at high risk of getting dislodged with the surgery and airway manipulation related to anesthesia. He understands and states that he is okay to proceed.)  Induction: intravenous. Anesthetic plan and risks discussed with patient. Plan discussed with CRNA.                 Nicole Rosenberg MD   7/12/2021

## 2021-07-13 ENCOUNTER — TELEPHONE (OUTPATIENT)
Dept: ADMINISTRATIVE | Age: 66
End: 2021-07-13

## 2021-07-13 NOTE — TELEPHONE ENCOUNTER
Patient called to set up a one week f/u with Dr. Oracio Palumbo for throat sx he had done 7/12/21, I was unable to find a one week appt, he can be reached at [phone].

## 2021-07-23 ENCOUNTER — TELEPHONE (OUTPATIENT)
Dept: ENT CLINIC | Age: 66
End: 2021-07-23

## 2021-07-23 ENCOUNTER — OFFICE VISIT (OUTPATIENT)
Dept: ENT CLINIC | Age: 66
End: 2021-07-23
Payer: MEDICARE

## 2021-07-23 VITALS
WEIGHT: 195 LBS | BODY MASS INDEX: 30.61 KG/M2 | TEMPERATURE: 97.2 F | HEIGHT: 67 IN | OXYGEN SATURATION: 96 % | HEART RATE: 69 BPM | DIASTOLIC BLOOD PRESSURE: 92 MMHG | SYSTOLIC BLOOD PRESSURE: 129 MMHG

## 2021-07-23 DIAGNOSIS — G89.3 PAIN DUE TO NEOPLASM: ICD-10-CM

## 2021-07-23 DIAGNOSIS — K14.8 TONGUE LESION: ICD-10-CM

## 2021-07-23 DIAGNOSIS — J38.3 LESION OF TRUE VOCAL CORD: ICD-10-CM

## 2021-07-23 DIAGNOSIS — C02.9 TONGUE CANCER (HCC): Primary | ICD-10-CM

## 2021-07-23 PROCEDURE — 99212 OFFICE O/P EST SF 10 MIN: CPT | Performed by: OTOLARYNGOLOGY

## 2021-07-23 RX ORDER — CHLORHEXIDINE GLUCONATE 0.12 MG/ML
15 RINSE ORAL 2 TIMES DAILY
Qty: 420 ML | Refills: 3 | Status: SHIPPED | OUTPATIENT
Start: 2021-07-23 | End: 2021-08-06

## 2021-07-23 RX ORDER — OXYCODONE AND ACETAMINOPHEN 7.5; 325 MG/1; MG/1
1 TABLET ORAL 2 TIMES DAILY
Qty: 28 TABLET | Refills: 0 | Status: SHIPPED | OUTPATIENT
Start: 2021-07-23 | End: 2021-08-05 | Stop reason: SDUPTHER

## 2021-07-23 ASSESSMENT — ENCOUNTER SYMPTOMS
SHORTNESS OF BREATH: 0
EYE PAIN: 0
EYES NEGATIVE: 1
SORE THROAT: 1
APNEA: 0
GASTROINTESTINAL NEGATIVE: 1
CHEST TIGHTNESS: 0
RESPIRATORY NEGATIVE: 1
EYE DISCHARGE: 0
ABDOMINAL PAIN: 0
COLOR CHANGE: 0
VOMITING: 0
DIARRHEA: 0

## 2021-07-23 NOTE — TELEPHONE ENCOUNTER
[facility] to authorize order with patient insurance. Patient is scheduled for PET CT with radiology on 8/3/21 @ 8:00am. Patient has been notified of date and time and that they need to arrive at 7:30am. Patient was informed he has no prep prior to procedure. Patient instructed to park in [facility] parking lot and report to registration. Detail voicemail left for patient.      Electronically signed by Rita Knutson on 7/23/21 at 2:26 PM EDT

## 2021-07-23 NOTE — PROGRESS NOTES
Subjective:      Patient ID:  Robyn Smith is a 72 y.o. male. HPI:  here for post op DL surgery 2 week ago. There are not changes since last visit. Throat is feeling better    Past Medical History:   Diagnosis Date    COPD (chronic obstructive pulmonary disease) ([address]     History of hepatitis C 2019    treated-2019    Hoarseness of voice     For OR 7-12-21    Mouth sores     Tongue mass     left     Past Surgical History:   Procedure Laterality Date    FRACTURE SURGERY      KNEE ARTHROSCOPY Left 1980    LARYNGOSCOPY N/A 7/12/2021    PANENDOSCOPY BRONCHOSCOPY ESOPHAGOSCOPY MICROSUSPENSION DIRECT LARYNGOSCOPY performed by Matthew Nick DO at [facility] N/A 7/12/2021    BIOPSY OF TONGUE BASE WITH FROZEN SECTION AND LEFT TRUE VOCAL CORD BIOPSY performed by Matthew Nick DO at [address] [place] [address]     History reviewed. No pertinent family history. Social History     Socioeconomic History    Marital status: Single     Spouse name: None    Number of children: None    Years of education: None    Highest education level: None   Occupational History    None   Tobacco Use    Smoking status: Current Every Day Smoker     Packs/day: 0.50     Years: 45.00     Pack years: 22.50     Types: Cigarettes    Smokeless tobacco: Never Used   Substance and Sexual Activity    Alcohol use: Yes     Alcohol/week: 1.0 standard drinks     Types: 1 Cans of beer per week     Comment: i beer/day    Drug use: Never    Sexual activity: None   Other Topics Concern    None   Social History Narrative    None     Social Determinants of Health     Financial Resource Strain:     Difficulty of Paying Living Expenses:    Food Insecurity:     Worried About Running Out of Food in the Last Year:     Ran Out of Food in the Last Year:    Transportation Needs:     Lack of Transportation (Medical):      Lack of Transportation (Non-Medical):    Physical Activity:     Days of Exercise per Week:     Minutes of Exercise per Session:    Stress:     Feeling of Stress :    Social Connections:     Frequency of Communication with Friends and Family:     Frequency of Social Gatherings with Friends and Family:     Attends Anabaptism Services:     Active Member of Clubs or Organizations:     Attends Club or Organization Meetings:     Marital Status:    Intimate Partner Violence:     Fear of Current or Ex-Partner:     Emotionally Abused:     Physically Abused:     Sexually Abused:      No Known Allergies        Review of Systems   Constitutional: Negative. Negative for appetite change. HENT: Positive for mouth sores and sore throat. Eyes: Negative. Negative for pain, discharge and visual disturbance. Respiratory: Negative. Negative for apnea, chest tightness and shortness of breath. Cardiovascular: Negative. Negative for chest pain, palpitations and leg swelling. Gastrointestinal: Negative. Negative for abdominal pain, diarrhea and vomiting. Endocrine: Negative for cold intolerance, heat intolerance and polydipsia. Genitourinary: Negative. Negative for dysuria, flank pain and hematuria. Musculoskeletal: Negative. Negative for arthralgias, gait problem and neck pain. Skin: Negative. Negative for color change, pallor and rash. Allergic/Immunologic: Negative for environmental allergies, food allergies and immunocompromised state. Neurological: Negative. Negative for dizziness, numbness and headaches. Hematological: Positive for adenopathy. Psychiatric/Behavioral: Negative. Negative for behavioral problems and hallucinations. All other systems reviewed and are negative. Objective:   Physical Exam  Vitals and nursing note reviewed. Constitutional:       Appearance: He is well-developed. HENT:      Head: Normocephalic and atraumatic.       Right Ear: Hearing, tympanic membrane, ear canal and external ear normal.      Left Ear: Hearing, tympanic membrane, ear canal and external ear normal.      Nose: Septal deviation present. Comments: Severe DNS right 90%    Level ii lymphadenopathy left neck     Mouth/Throat:      Lips: Pink. Mouth: Mucous membranes are moist.        Comments: 3-4cm hard immobile area of the left posterior lateral tongue, endophytic involving posterior midline of tongue     Pt has limited opening of the mandible 3-4 mm    Eyes:      Conjunctiva/sclera: Conjunctivae normal.      Pupils: Pupils are equal, round, and reactive to light. Neck:     Cardiovascular:      Rate and Rhythm: Normal rate and regular rhythm. Heart sounds: Normal heart sounds. Pulmonary:      Effort: Pulmonary effort is normal.      Breath sounds: Normal breath sounds. Abdominal:      General: Bowel sounds are normal.      Palpations: Abdomen is soft. Musculoskeletal:      Cervical back: Normal range of motion and neck supple. Skin:     General: Skin is warm and dry. Neurological:      Mental Status: He is alert and oriented to person, place, and time. Path:  Diagnosis:   A.  Left true vocal cord, biopsy: Vocal cord polyp (gelatinous type)     B.  Tongue, lesion of base, biopsy: Invasive squamous cell carcinoma, see   comment. Assessment:       Diagnosis Orders   1. Pain due to neoplasm     2. Tongue lesion     3. Lesion of true vocal cord                Plan:      I will have pt get PET scan and refer to oncology. I will also give some pain meds for a week or two until he sees oncology.     Follow up in 2 week(s)

## 2021-08-03 ENCOUNTER — HOSPITAL ENCOUNTER (OUTPATIENT)
Dept: PET IMAGING | Age: 66
Discharge: HOME | End: 2021-08-05
Payer: MEDICARE

## 2021-08-03 DIAGNOSIS — C02.9 TONGUE CANCER (HCC): ICD-10-CM

## 2021-08-03 LAB — METER GLUCOSE: 108 MG/DL (ref 74–99)

## 2021-08-03 PROCEDURE — 82962 GLUCOSE BLOOD TEST: CPT

## 2021-08-03 PROCEDURE — 3430000000 HC RX DIAGNOSTIC RADIOPHARMACEUTICAL: Performed by: RADIOLOGY

## 2021-08-03 PROCEDURE — A9552 F18 FDG: HCPCS | Performed by: RADIOLOGY

## 2021-08-03 PROCEDURE — 78815 PET IMAGE W/CT SKULL-THIGH: CPT

## 2021-08-03 RX ORDER — FLUDEOXYGLUCOSE F 18 200 MCI/ML
15 INJECTION, SOLUTION INTRAVENOUS
Status: COMPLETED | OUTPATIENT
Start: 2021-08-03 | End: 2021-08-03

## 2021-08-03 RX ADMIN — FLUDEOXYGLUCOSE F 18 15 MILLICURIE: 200 INJECTION, SOLUTION INTRAVENOUS at 09:32

## 2021-08-05 ENCOUNTER — OFFICE VISIT (OUTPATIENT)
Dept: ENT CLINIC | Age: 66
End: 2021-08-05
Payer: MEDICARE

## 2021-08-05 VITALS
DIASTOLIC BLOOD PRESSURE: 66 MMHG | WEIGHT: 194 LBS | OXYGEN SATURATION: 93 % | BODY MASS INDEX: 30.45 KG/M2 | HEIGHT: 67 IN | SYSTOLIC BLOOD PRESSURE: 135 MMHG | TEMPERATURE: 97.5 F | HEART RATE: 68 BPM

## 2021-08-05 DIAGNOSIS — G89.3 PAIN DUE TO NEOPLASM: ICD-10-CM

## 2021-08-05 DIAGNOSIS — J38.3 LESION OF TRUE VOCAL CORD: ICD-10-CM

## 2021-08-05 DIAGNOSIS — K14.8 TONGUE LESION: ICD-10-CM

## 2021-08-05 DIAGNOSIS — C02.9 TONGUE CANCER (HCC): Primary | ICD-10-CM

## 2021-08-05 PROCEDURE — 99213 OFFICE O/P EST LOW 20 MIN: CPT | Performed by: OTOLARYNGOLOGY

## 2021-08-05 RX ORDER — OXYCODONE AND ACETAMINOPHEN 7.5; 325 MG/1; MG/1
1 TABLET ORAL 2 TIMES DAILY
Qty: 28 TABLET | Refills: 0 | Status: SHIPPED | OUTPATIENT
Start: 2021-08-05 | End: 2021-08-19

## 2021-08-05 ASSESSMENT — ENCOUNTER SYMPTOMS
VOMITING: 0
EYES NEGATIVE: 1
ABDOMINAL PAIN: 0
SORE THROAT: 1
EYE PAIN: 0
DIARRHEA: 0
APNEA: 0
SHORTNESS OF BREATH: 0
EYE DISCHARGE: 0
RESPIRATORY NEGATIVE: 1
COLOR CHANGE: 0
GASTROINTESTINAL NEGATIVE: 1
CHEST TIGHTNESS: 0

## 2021-08-05 NOTE — PROGRESS NOTES
Subjective:      Patient ID:  Pearl Alvarez is a 72 y.o. male. HPI:    Pt returns for review of PET. There are not changes since last visit. Pt is in pain difficulty opening mouth    Past Medical History:   Diagnosis Date    COPD (chronic obstructive pulmonary disease) ([address]     History of hepatitis C 2019    treated-2019    Hoarseness of voice     For OR 7-12-21    Mouth sores     Tongue mass     left     Past Surgical History:   Procedure Laterality Date    FRACTURE SURGERY      KNEE ARTHROSCOPY Left 1980    LARYNGOSCOPY N/A 7/12/2021    PANENDOSCOPY BRONCHOSCOPY ESOPHAGOSCOPY MICROSUSPENSION DIRECT LARYNGOSCOPY performed by Keila November, DO at [facility] N/A 7/12/2021    BIOPSY OF TONGUE BASE WITH FROZEN SECTION AND LEFT TRUE VOCAL CORD BIOPSY performed by Keila November, DO at [address] [place] [address]     History reviewed. No pertinent family history. Social History     Socioeconomic History    Marital status: Single     Spouse name: None    Number of children: None    Years of education: None    Highest education level: None   Occupational History    None   Tobacco Use    Smoking status: Current Every Day Smoker     Packs/day: 0.50     Years: 45.00     Pack years: 22.50     Types: Cigarettes    Smokeless tobacco: Never Used   Substance and Sexual Activity    Alcohol use: Yes     Alcohol/week: 1.0 standard drinks     Types: 1 Cans of beer per week     Comment: i beer/day    Drug use: Never    Sexual activity: None   Other Topics Concern    None   Social History Narrative    None     Social Determinants of Health     Financial Resource Strain:     Difficulty of Paying Living Expenses:    Food Insecurity:     Worried About Running Out of Food in the Last Year:     Ran Out of Food in the Last Year:    Transportation Needs:     Lack of Transportation (Medical):      Lack of Transportation (Non-Medical):    Physical Activity:     Days of Exercise per Week:  Minutes of Exercise per Session:    Stress:     Feeling of Stress :    Social Connections:     Frequency of Communication with Friends and Family:     Frequency of Social Gatherings with Friends and Family:     Attends Church Services:     Active Member of Clubs or Organizations:     Attends Club or Organization Meetings:     Marital Status:    Intimate Partner Violence:     Fear of Current or Ex-Partner:     Emotionally Abused:     Physically Abused:     Sexually Abused:      No Known Allergies        Review of Systems   Constitutional: Negative. Negative for appetite change. HENT: Positive for mouth sores and sore throat. Eyes: Negative. Negative for pain, discharge and visual disturbance. Respiratory: Negative. Negative for apnea, chest tightness and shortness of breath. Cardiovascular: Negative. Negative for chest pain, palpitations and leg swelling. Gastrointestinal: Negative. Negative for abdominal pain, diarrhea and vomiting. Endocrine: Negative for cold intolerance, heat intolerance and polydipsia. Genitourinary: Negative. Negative for dysuria, flank pain and hematuria. Musculoskeletal: Negative. Negative for arthralgias, gait problem and neck pain. Skin: Negative. Negative for color change, pallor and rash. Allergic/Immunologic: Negative for environmental allergies, food allergies and immunocompromised state. Neurological: Negative. Negative for dizziness, numbness and headaches. Hematological: Positive for adenopathy. Psychiatric/Behavioral: Negative. Negative for behavioral problems and hallucinations. All other systems reviewed and are negative. Objective:     Vitals:    08/05/21 1636   BP: 135/66   Pulse: 68   Temp: 97.5 °F (36.4 °C)   SpO2: 93%     Physical Exam  Vitals and nursing note reviewed. Constitutional:       Appearance: He is well-developed. HENT:      Head: Normocephalic and atraumatic.       Right Ear: Hearing, tympanic membrane, ear canal and external ear normal.      Left Ear: Hearing, tympanic membrane, ear canal and external ear normal.      Nose: Septal deviation present. Comments: Severe DNS right 90%    Level ii lymphadenopathy left neck     Mouth/Throat:      Lips: Pink. Mouth: Mucous membranes are moist.        Comments: 3-4cm hard immobile area of the left posterior lateral tongue, endophytic involving posterior midline of tongue     Pt has limited opening of the mandible 3-4 mm    Eyes:      Conjunctiva/sclera: Conjunctivae normal.      Pupils: Pupils are equal, round, and reactive to light. Neck:     Cardiovascular:      Rate and Rhythm: Normal rate and regular rhythm. Heart sounds: Normal heart sounds. Pulmonary:      Effort: Pulmonary effort is normal.      Breath sounds: Normal breath sounds. Abdominal:      General: Bowel sounds are normal.      Palpations: Abdomen is soft. Musculoskeletal:      Cervical back: Normal range of motion and neck supple. Skin:     General: Skin is warm and dry. Neurological:      Mental Status: He is alert and oriented to person, place, and time. PET  Impression   1.  Metabolically active mass along the left aspect of the tongue as   described on the recent CT scan, consistent with history of cancer.  The   increased activity extends along the posterior pharyngeal wall crossing   midline.  Correlate with direct visualization.       2.  Metabolically active left level 2 lymph node concerning for metastatic   disease with probable additional metastatic disease in a left level 3 lymph   node.       3.  Low-level activity associated with a nonenlarged right level 2 lymph node   is probably normal reactive change.  Attention on follow-up. Assessment:       Diagnosis Orders   1. Tongue cancer ([address]     2. Lesion of true vocal cord     3. Pain due to neoplasm     4.  Tongue lesion                Plan:      Discussed the tumor board results with the patient and the best option is chemo radiation therapy.  I will give him another pain script for help with eating until he can get a PEG placed and see oncology.;  Follow up in 1 month(s)

## 2021-08-10 ENCOUNTER — HOSPITAL ENCOUNTER (OUTPATIENT)
Dept: RADIATION ONCOLOGY | Age: 66
Discharge: HOME | End: 2021-08-10
Payer: MEDICARE

## 2021-08-10 VITALS
TEMPERATURE: 97.1 F | SYSTOLIC BLOOD PRESSURE: 138 MMHG | DIASTOLIC BLOOD PRESSURE: 68 MMHG | OXYGEN SATURATION: 98 % | HEART RATE: 63 BPM | BODY MASS INDEX: 28.05 KG/M2 | RESPIRATION RATE: 18 BRPM | WEIGHT: 179.1 LBS

## 2021-08-10 PROCEDURE — 99205 OFFICE O/P NEW HI 60 MIN: CPT | Performed by: SPECIALIST

## 2021-08-10 PROCEDURE — 99205 OFFICE O/P NEW HI 60 MIN: CPT

## 2021-08-10 SDOH — ECONOMIC STABILITY: HOUSING INSECURITY: PLEASE ASSESS YOUR PATIENT'S LEVEL OF DISTRESS CONCERNING HOUSING (SCALE FROM 1-10): 0 (NONE)

## 2021-08-10 NOTE — PROGRESS NOTES
Radiation Oncology Consult Note         8/10/2021    Viji Narvaez  72 y.o.   1955    REFERRING PROVIDER: Olinda Cullen    PCP:  Paul Dsouza MD    CHIEF COMPLAINT: Left tongue and neck mass    DIAGNOSIS: [id number] SCC left oral tongue    STAGING: Cancer Staging  No matching staging information was found for the patient. HISTORY OF PRESENT ILLNESS: Mr. Viji Narvaez  is a 72y.o. year old male who presented with a 2-year history of a left oral tongue lesion that has been progressive. More recently, he noted a left-sided left neck mass for which she sought medical attention with his family physician Dr. Marilu Boyle. He was treated with a short course of antibiotics which did not result in any response per the patient. Was also seen by Dr. Giselle Fierro with a chief complaint of trismus. Ultimately a work-up including a CT of the neck revealed an abnormal mass in the lateral aspect of the oral tongue involving a significant portion of the tongue measuring 4 x 2 x 2.8 cm. A level 2A lymph node was also noted. The patient underwent a laryngoscopy on 7/19/2021 which revealed a benign-appearing polyp on the left true vocal cord but a mass involving the left tonsil fossa. This was biopsied and found to be a invasive squamous cell carcinoma p16-positive. PET/CT on August 3, 2021 revealed a masslike structure involving the left tongue extending to the posterior pharyngeal wall crossing the midline. There was also metabolic activity noted in a zone 3 node on the ipsilateral side. Patient now comes to us for consideration of nonsurgical treatment options.     PAST MEDICAL HISTORY:      Diagnosis Date    [facility]) [address]     History of hepatitis C 2019    treated-2019    Hoarseness of voice     For OR 7-12-21    Mouth sores     Tongue mass     left       PAST SURGICAL HISTORY:      Procedure Laterality Date    FRACTURE SURGERY      KNEE ARTHROSCOPY Left 1980  LARYNGOSCOPY N/A 7/12/2021    PANENDOSCOPY BRONCHOSCOPY ESOPHAGOSCOPY MICROSUSPENSION DIRECT LARYNGOSCOPY performed by Percy Manzo DO at [facility] N/A 7/12/2021    BIOPSY OF TONGUE BASE WITH FROZEN SECTION AND LEFT TRUE VOCAL CORD BIOPSY performed by Percy Manzo DO at [facility] OR       No Known Allergies    MEDICATIONS:  Medications reviewed and reconciled. Current Outpatient Medications   Medication Sig Dispense Refill    oxyCODONE-acetaminophen (PERCOCET) 7.5-325 MG per tablet Take 1 tablet by mouth 2 times daily for 14 days. Intended supply: 14 days 28 tablet 0    ibuprofen (ADVIL;MOTRIN) 600 MG tablet       diazePAM (VALIUM) 5 MG tablet Take 5 mg by mouth 2 times daily.  albuterol sulfate  (90 Base) MCG/ACT inhaler INHALE 2 PUFFS INTO THE LUNGS EVERY 6 HOURS AS NEEDED FOR WHEEZING 54 g 3    ADVAIR DISKUS 250-50 MCG/DOSE AEPB Inhale 1 puff into the lungs 2 times daily        No current facility-administered medications for this encounter. FAMILY HISTORY:  Family History   Problem Relation Age of Onset    Cancer Father         lung       SOCIAL HISTORY:       reports that he has been smoking cigarettes. He has a 22.50 pack-year smoking history. He has never used smokeless tobacco..   reports current alcohol use of about 1.0 standard drinks of alcohol per week. .   reports no history of drug use. REVIEW OF SYSTEMS:  Obtained from the patient, chart review and nursing assessment.   General ROS: positive for  - fatigue  Psychological ROS: Negative  Ophthalmic ROS: negative  ENT ROS: positive for - oral lesions, sore throat and vocal changes  negative for - epistaxis, headaches, hearing change, sinus pain, sneezing, vertigo or visual changes  Allergy and Immunology ROS: negative  Hematological and Lymphatic ROS: negative  Endocrine ROS: negative  Breast ROS: negative  Respiratory ROS: no cough, shortness of breath, or wheezing  Cardiovascular ROS: No gross muscle weakness. Muscle size, tone and strength are normal.   Extremities:  No lower extremity edema, ulcerations, tenderness, varicosities or erythema. Coordination appears adequate. Sensory function appears intact. Skin:  Warm and dry. Examination of color, turgor and pigmentation was relatively normal. No bruises or skin rashes. Neurological/Psychiatric: General behavior, level of consciousness, thought content is normal. The patient's emotional status is normal.  Cranial nerves II-XII are grossly intact. RADIATION SAFETY AND ONCOLOGIC TREATMENT SUPPORT:    - Previous radiation history:  No  - History of autoimmune or connective tissue disease:  No  - Nutritional support/ PEG:  Not applicable  - Dental evaluation:  Not applicable  -  requested:  Not asked for.  - Oncology Nurse navigator requested:  - Transportation for daily treatment:  Self    TUMOR MARKERS: No results found for: PSA, CEA, , [id number],     IMAGING REVIEWED:  6/27/2021 CT of the neck:   Abnormal enhancing lesion in left lateral aspect of the oral tongue involving the tongue measuring 4 x 2.0 x 2.8 cm with a left level 2A lymph node. 8/3/2021 PET/CT of the neck through the thighs:  Increased uptake involving the left oral tongue extending to the posterior pharyngeal wall crossing the midline with a metabolic activity in the zone 3 lymph node    PATHOLOGY REVIEWED:  7/12/2021 left true vocal cord base of tongue biopsy:  REASON FOR REVISION:   This report is revised by [facility] on 7/19/21 in order to make a corrrection   due to voice transcription recognition error. The FINAL DIAGNOSIS remains   UNCHANGED. Originally reported on 7/16/21. Diagnosis:   A.  Left true vocal cord, biopsy: Vocal cord polyp (gelatinous type)     B.  Tongue, lesion of base, biopsy: Invasive squamous cell carcinoma, see   comment.      Comment:    Immunostaining for p16 (surrogate marker for transcriptional   active high risk HPV)

## 2021-08-10 NOTE — PROGRESS NOTES
Felicia Grantalds  [birth year] 72 y.o. Referring Physician: gertrude    PCP: Ankita Vasquez MD     Vitals:    08/10/21 1143   BP: 138/68   Pulse: 63   Resp: 18   Temp: 97.1 °F (36.2 °C)   SpO2: 98%        Wt Readings from Last 3 Encounters:   08/10/21 179 lb 1.6 oz (81.2 kg)   08/05/21 194 lb (88 kg)   07/23/21 195 lb (88.5 kg)        Body mass index is 28.05 kg/m². Chief Complaint: No chief complaint on file. Cancer Staging  No matching staging information was found for the patient. Prior Radiation Therapy? NO    Concurrent Chemo/radiation? NO    Prior Chemotherapy? NO    Prior Hormonal Therapy? NO    Head and Neck Cancer? No, patient does NOT have HN cancer. Current Outpatient Medications   Medication Sig Dispense Refill    oxyCODONE-acetaminophen (PERCOCET) 7.5-325 MG per tablet Take 1 tablet by mouth 2 times daily for 14 days. Intended supply: 14 days 28 tablet 0    ibuprofen (ADVIL;MOTRIN) 600 MG tablet       diazePAM (VALIUM) 5 MG tablet Take 5 mg by mouth 2 times daily.  albuterol sulfate  (90 Base) MCG/ACT inhaler INHALE 2 PUFFS INTO THE LUNGS EVERY 6 HOURS AS NEEDED FOR WHEEZING 54 g 3    ADVAIR DISKUS 250-50 MCG/DOSE AEPB Inhale 1 puff into the lungs 2 times daily        No current facility-administered medications for this encounter.        Past Medical History:   Diagnosis Date    [facility]) [facility]     History of hepatitis C 2019    treated-2019    Hoarseness of voice     For OR 7-12-21    Mouth sores     Tongue mass     left       Past Surgical History:   Procedure Laterality Date    FRACTURE SURGERY      KNEE ARTHROSCOPY Left 1980    LARYNGOSCOPY N/A 7/12/2021    PANENDOSCOPY BRONCHOSCOPY ESOPHAGOSCOPY MICROSUSPENSION DIRECT LARYNGOSCOPY performed by Alli Cleary DO at [facility] N/A 7/12/2021    BIOPSY OF TONGUE BASE WITH FROZEN SECTION AND LEFT TRUE VOCAL CORD BIOPSY performed by Pat Barclay DO at [facility] OR       Family History   Problem Relation Age of Onset    Cancer Father         lung       Social History     Socioeconomic History    Marital status: Single     Spouse name: Not on file    Number of children: 0    Years of education: Not on file    Highest education level: Not on file   Occupational History    Occupation: dairy compressor   Tobacco Use    Smoking status: Current Every Day Smoker     Packs/day: 0.50     Years: 45.00     Pack years: 22.50     Types: Cigarettes    Smokeless tobacco: Never Used   Substance and Sexual Activity    Alcohol use: Yes     Alcohol/week: 1.0 standard drinks     Types: 1 Cans of beer per week     Comment: i beer/day    Drug use: Never    Sexual activity: Not on file   Other Topics Concern    Not on file   Social History Narrative    Not on file     Social Determinants of Health     Financial Resource Strain:     Difficulty of Paying Living Expenses:    Food Insecurity:     Worried About Running Out of Food in the Last Year:     [address] in the Last Year:    Transportation Needs:     Lack of Transportation (Medical):  Lack of Transportation (Non-Medical):    Physical Activity:     Days of Exercise per Week:     Minutes of Exercise per Session:    Stress:     Feeling of Stress :    Social Connections:     Frequency of Communication with Friends and Family:     Frequency of Social Gatherings with Friends and Family:     Attends Zoroastrian Services:     Active Member of Clubs or Organizations:     Attends Club or Organization Meetings:     Marital Status:    Intimate Partner Violence:     Fear of Current or Ex-Partner:     Emotionally Abused:     Physically Abused:     Sexually Abused:            Occupation:retired   Retired:  YES: Patient is retired from dairy compressor.         REVIEW OF SYSTEMS: <<For Level 5, 10 or more systems>> approximately >20 mins spent with patient and friend about head and neck radiation therpy utilizing handouts and slides. Pt has had a sore in the back of his mouth for 2 years and went to see Dr. Maria A Marmolejo. Biopsy done 7/12/2021 that presented invasive squamous cell carcinoma, +p16. Pt had follow-up visit and his recommendation is to have radiation and chemotherapy and not surgery. He has not seen a medical oncology at this time. He is going to see Dr. Maria A Marmolejo in 2 weeks to make the final decision, reviewed at tumor board. Pt recently had teeth removed and needs to revisit for clearance. All questions were answered from a nursing perspective and they expressed understanding of care. Pacemaker/Defibulator/ICD:  No    Mediport: No        FALLS RISK SCREENING ASSESSMENT    Instructions:  Assess the patient and enter the appropriate indicators that are present for fall risk identification. Total the numbers entered and assign a fall risk score from Table 2.  Reassess patient at a minimum every 12 weeks or with status change. Assessment   Date  8/10/2021     1. Mental Ability: confusion/cognitively impaired No - 0       2. Elimination Issues: incontinence, frequency No - 0       3. Ambulatory: use of assistive devices (walker, cane, off-loading devices), attached to equipment (IV pole, oxygen) No - 0     4. Sensory Limitations: dizziness, vertigo, impaired vision No - 0       5. Age 72 years or greater - 1       10. Medication: diuretics, strong analgesics, hypnotics, sedatives, antihypertensive agents   No - 0   7. Falls:  recent history of falls within the last 3 months (not to include slipping or tripping)   No - 0   TOTAL 0    If score of 4 or greater was education given? Yes       TABLE 2   Risk Score Risk Level Plan of Care   0-3 Little or  No Risk 1. Provide assistance as indicated for ambulation activities  2. Reorient confused/cognitively impaired patient  3. Call-light/bell within patient's reach  4.   Chair/bed in low position, stretcher/bed with siderails up except when performing patient care activities  5. Educate patient/family/caregiver on falls prevention  6.  Reassess in 12 weeks or with any noted change in patient condition which places them at a risk for a fall   4-6 Moderate Risk 1. Provide assistance as indicated for ambulation activities  2. Reorient confused/cognitively impaired patient  3. Call-light/bell within patient's reach  4. Chair/bed in low position, stretcher/bed with siderails up except when performing patient care activities  5. Educate patient/family/caregiver on falls prevention  6. Falls risk precaution (Yellow sticker Level II) placed on patient chart   7 or   Higher High Risk 1. Place patient in easily observable treatment room  2. Patient attended at all times by family member or staff  3. Provide assistance as indicated for ambulation activities  4. Reorient confused/cognitively impaired patient  5. Call-light/bell within patient's reach  6. Chair/bed in low position, stretcher/bed with siderails up except when performing patient care activities  7. Educate patient/family/caregiver on falls prevention  8. Falls risk precaution (Yellow sticker Level III) placed on patient chart           MALNUTRITION RISK SCREENING ASSESSMENT    Instructions:  Assess the patient and enter the appropriate indicators that are present for nutrition risk identification. Total the numbers entered and assign a risk score. Follow the appropriate action for total score listed below. Assessment   Date  8/10/2021     1. Have you lost weight without trying? Yes -3     2. Have you been eating poorly because of a decreased appetite? 2- Yes   3. Do you have a diagnosis of head and neck cancer?       2- Yes                                                                                    TOTAL 7          Score of 0-1: No action  Score 2 or greater:  · For Non-Diabetic Patient: Recommend adding Ensure Complete 2 x daily and provide patient with Ensure wellness bag with coupons  · For Diabetic Patient: Recommend adding Glucerna Shake 2 x daily and provide patient with Glucerna Wellness bag with coupons  · Route to the dietitian via [address]    · Are you having difficulty performing daily routine tasks due to fatigue or weakness (ie: bathing/showering, dressing, housework, meal prep, work, , etc): Yes     · Do you have any arm flexibility/ROM restrictions, swelling or pain that limit activity: No     · Any changes in memory, attention/focus that impact daily activities: No     · Do you avoid participation in leisure/social activity due to weakness, fatigue or pain: No     ARE ANY OF THE ABOVE ARE ANSWERED YES: No          PT ASSESSMENT FOR REFERRAL    · Have you had any recent falls in the past 2 months: No     · Do you have difficulty going up/down stairs: No     · Are you having difficulty walking: No     · Do you often hold onto furniture/environmental supports or feel off balance when you are walking: No     · Do you need to take rest breaks when you are walking: No     · Any pain on a scale of 1-10 that limits your mobility: No 0/10    ARE ANY OF THE ABOVE ARE ANSWERED YES: No                     PREHAB AUDIOLOGY REFERRAL    - Is patient planned to receive Cisplatin? No. This patient is not planned to start Cisplatin. - Is patient planned to receive radiation therapy that may be directed toward auditory canals or nerves? No. Patient is not planned to start radiation therapy to auditory canals or nerves. - Is patient complaining of new onset hearing loss? No. Patient is not complaining of new onset hearing loss. Patient education given on radiation therapy to the head and neck. The patient expresses understanding and acceptance of instructions.  Harper Batista RN 8/10/2021 11:46 AM           Harper Batista RN

## 2021-08-16 ENCOUNTER — TELEPHONE (OUTPATIENT)
Dept: ENT CLINIC | Age: 66
End: 2021-08-16

## 2021-08-16 NOTE — TELEPHONE ENCOUNTER
Patient called in requesting a refill of Percocet. MA told patient that legally Dr. Judy Delgado is not able to prescribe any more percocet to him. Patient is seeing his primary care on Wednesday because oncology told him they do not prescribe pills.     Electronically signed by Katina Mathews MA on 8/16/21 at 4:14 PM EDT

## 2021-08-23 ENCOUNTER — TELEPHONE (OUTPATIENT)
Dept: RADIATION ONCOLOGY | Age: 66
End: 2021-08-23

## 2021-09-02 ENCOUNTER — TELEPHONE (OUTPATIENT)
Dept: RADIATION ONCOLOGY | Age: 66
End: 2021-09-02

## 2021-09-02 NOTE — TELEPHONE ENCOUNTER
Rad onc nurse called to see if patient has/hasnot gotten dental clearance  And/or teeth extracted as originally planned. Gave number for call back and clearance form.

## 2021-09-03 ENCOUNTER — TELEPHONE (OUTPATIENT)
Dept: RADIATION ONCOLOGY | Age: 66
End: 2021-09-03

## 2021-09-03 NOTE — TELEPHONE ENCOUNTER
Rad nurse called patient about dental clearance. He has a  Appointment with Dr. Daria Calvo on 9/8/2021 and he will then know if he is going to have dental clearance. He will call for the papers at that time for our records. He also has an appintment 9/15/2021 with the surgeon for clearance per patient.

## 2021-09-10 ENCOUNTER — TELEPHONE (OUTPATIENT)
Dept: RADIATION ONCOLOGY | Age: 66
End: 2021-09-10

## 2021-09-10 NOTE — TELEPHONE ENCOUNTER
Spoke to Joelle Resendiz from oral surgeon Dr Crisostomo's office, she states Dr would like to wait 10 to 14 days, and she states \"we don't give clearance but I can send some notes to your office\", fax # provided.
Negative

## 2021-09-15 ENCOUNTER — OFFICE VISIT (OUTPATIENT)
Dept: ENT CLINIC | Age: 66
End: 2021-09-15
Payer: MEDICARE

## 2021-09-15 VITALS — HEIGHT: 67 IN | TEMPERATURE: 97.4 F | BODY MASS INDEX: 28.25 KG/M2 | WEIGHT: 180 LBS

## 2021-09-15 DIAGNOSIS — J38.3 LESION OF TRUE VOCAL CORD: ICD-10-CM

## 2021-09-15 DIAGNOSIS — C02.9 TONGUE CANCER (HCC): Primary | ICD-10-CM

## 2021-09-15 DIAGNOSIS — K14.8 TONGUE LESION: ICD-10-CM

## 2021-09-15 PROCEDURE — 99213 OFFICE O/P EST LOW 20 MIN: CPT | Performed by: OTOLARYNGOLOGY

## 2021-09-15 ASSESSMENT — ENCOUNTER SYMPTOMS
EYE DISCHARGE: 0
EYE PAIN: 0
DIARRHEA: 0
SORE THROAT: 1
RESPIRATORY NEGATIVE: 1
CHEST TIGHTNESS: 0
VOMITING: 0
GASTROINTESTINAL NEGATIVE: 1
APNEA: 0
EYES NEGATIVE: 1
COLOR CHANGE: 0
ABDOMINAL PAIN: 0
SHORTNESS OF BREATH: 0

## 2021-09-15 NOTE — PROGRESS NOTES
Subjective:      Patient ID:  Felicia Mccullough is a 77 y.o. male. HPI:    Patient presents today for cancer recheck. Condition has been present for 1 month(s). Pt has had the teeth removed and is planning on oncology treatment for the cancer soon. Past Medical History:   Diagnosis Date    COPD (chronic obstructive pulmonary disease) ([facility] [address]     History of hepatitis C 2019    treated-2019    Hoarseness of voice     For OR 7-12-21    Mouth sores     Tongue mass     left     Past Surgical History:   Procedure Laterality Date    FRACTURE SURGERY      KNEE ARTHROSCOPY Left 1980    LARYNGOSCOPY N/A 7/12/2021    PANENDOSCOPY BRONCHOSCOPY ESOPHAGOSCOPY MICROSUSPENSION DIRECT LARYNGOSCOPY performed by Alli Cleary DO at [facility] N/A 7/12/2021    BIOPSY OF TONGUE BASE WITH FROZEN SECTION AND LEFT TRUE VOCAL CORD BIOPSY performed by Alli Cleary DO at [facility] OR     Family History   Problem Relation Age of Onset    Cancer Father         lung     Social History     Socioeconomic History    Marital status: Single     Spouse name: None    Number of children: 0    Years of education: None    Highest education level: None   Occupational History    Occupation: dairy compressor   Tobacco Use    Smoking status: Current Every Day Smoker     Packs/day: 0.50     Years: 45.00     Pack years: 22.50     Types: Cigarettes    Smokeless tobacco: Never Used   Substance and Sexual Activity    Alcohol use:  Yes     Alcohol/week: 1.0 standard drinks     Types: 1 Cans of beer per week     Comment: i beer/day    Drug use: Never    Sexual activity: None   Other Topics Concern    None   Social History Narrative    None     Social Determinants of Health     Financial Resource Strain:     Difficulty of Paying Living Expenses:    Food Insecurity:     Worried About Running Out of Food in the Last Year:     Harsh of Food in the Last Year:    Transportation Needs:     Lack of Transportation (Medical):  Lack of Transportation (Non-Medical):    Physical Activity:     Days of Exercise per Week:     Minutes of Exercise per Session:    Stress:     Feeling of Stress :    Social Connections:     Frequency of Communication with Friends and Family:     Frequency of Social Gatherings with Friends and Family:     Attends Islam Services:     Active Member of Clubs or Organizations:     Attends Club or Organization Meetings:     Marital Status:    Intimate Partner Violence:     Fear of Current or Ex-Partner:     Emotionally Abused:     Physically Abused:     Sexually Abused:      No Known Allergies    Review of Systems   Constitutional: Negative. Negative for appetite change. HENT: Positive for mouth sores and sore throat. Eyes: Negative. Negative for pain, discharge and visual disturbance. Respiratory: Negative. Negative for apnea, chest tightness and shortness of breath. Cardiovascular: Negative. Negative for chest pain, palpitations and leg swelling. Gastrointestinal: Negative. Negative for abdominal pain, diarrhea and vomiting. Endocrine: Negative for cold intolerance, heat intolerance and polydipsia. Genitourinary: Negative. Negative for dysuria, flank pain and hematuria. Musculoskeletal: Negative. Negative for arthralgias, gait problem and neck pain. Skin: Negative. Negative for color change, pallor and rash. Allergic/Immunologic: Negative for environmental allergies, food allergies and immunocompromised state. Neurological: Negative. Negative for dizziness, numbness and headaches. Hematological: Positive for adenopathy. Psychiatric/Behavioral: Negative. Negative for behavioral problems and hallucinations. All other systems reviewed and are negative. Objective:     Vitals:    09/15/21 1616   Temp: 97.4 °F (36.3 °C)     Physical Exam  Vitals and nursing note reviewed.    Constitutional:       Appearance: He is well-developed. HENT:      Head: Normocephalic and atraumatic. Right Ear: Hearing, tympanic membrane, ear canal and external ear normal.      Left Ear: Hearing, tympanic membrane, ear canal and external ear normal.      Nose: Septal deviation present. Comments: Severe DNS right 90%    Level ii lymphadenopathy left neck     Mouth/Throat:      Lips: Pink. Mouth: Mucous membranes are moist.        Comments: 3-4cm hard immobile area of the left posterior lateral tongue, endophytic involving posterior midline of tongue     Pt has limited opening of the mandible 3-4 mm    Eyes:      Conjunctiva/sclera: Conjunctivae normal.      Pupils: Pupils are equal, round, and reactive to light. Neck:     Cardiovascular:      Rate and Rhythm: Normal rate and regular rhythm. Heart sounds: Normal heart sounds. Pulmonary:      Effort: Pulmonary effort is normal.      Breath sounds: Normal breath sounds. Abdominal:      General: Bowel sounds are normal.      Palpations: Abdomen is soft. Musculoskeletal:      Cervical back: Normal range of motion and neck supple. Skin:     General: Skin is warm and dry. Neurological:      Mental Status: He is alert and oriented to person, place, and time. Assessment:       Diagnosis Orders   1. Tongue cancer ([address]     2. Lesion of true vocal cord     3. Tongue lesion                Plan:      Pt is stable with tongue mass.   Needs treament to start with oncology  Follow up in 1 month(s)

## 2021-09-16 ENCOUNTER — TELEPHONE (OUTPATIENT)
Dept: RADIATION ONCOLOGY | Age: 66
End: 2021-09-16

## 2021-09-16 NOTE — TELEPHONE ENCOUNTER
Rad onc nurse called patient back and he updated me on his appointment with Dr. Jessica Lay and his incision to the mouth for the removal of 12 teeth. He was to come in one week for a SIM. Will follow through on the clearance and Sim appointment.

## 2021-09-29 ENCOUNTER — TELEPHONE (OUTPATIENT)
Dept: ENT CLINIC | Age: 66
End: 2021-09-29

## 2021-09-29 ENCOUNTER — TELEPHONE (OUTPATIENT)
Dept: RADIATION ONCOLOGY | Age: 66
End: 2021-09-29

## 2021-09-29 ENCOUNTER — TELEPHONE (OUTPATIENT)
Dept: CASE MANAGEMENT | Age: 66
End: 2021-09-29

## 2021-09-29 DIAGNOSIS — C02.9 TONGUE CANCER (HCC): Primary | ICD-10-CM

## 2021-09-29 DIAGNOSIS — C01 CANCER OF BASE OF TONGUE (HCC): Primary | ICD-10-CM

## 2021-09-29 NOTE — TELEPHONE ENCOUNTER
Rad Onc nurse called to have patient notified that he needs blood drawn before Simulation tomorrow. Patient verbalized understanding and will have it done before he comes.

## 2021-09-29 NOTE — TELEPHONE ENCOUNTER
Chart reviewed with Dr Elo Perez. Patient coming for Ct simulation tomorrow. Patient needs referral to Medical Oncology. Called Dr Donohue's office and left a message regarding need for referral to Medical Oncology.  Left my contact number for any updates on the referral.

## 2021-09-30 ENCOUNTER — HOSPITAL ENCOUNTER (OUTPATIENT)
Dept: RADIATION ONCOLOGY | Age: 66
Discharge: HOME | End: 2021-09-30
Attending: SPECIALIST
Payer: MEDICARE

## 2021-09-30 ENCOUNTER — HOSPITAL ENCOUNTER (OUTPATIENT)
Age: 66
Discharge: HOME | End: 2021-09-30
Payer: MEDICARE

## 2021-09-30 ENCOUNTER — TELEPHONE (OUTPATIENT)
Dept: RADIATION ONCOLOGY | Age: 66
End: 2021-09-30

## 2021-09-30 DIAGNOSIS — C01 CANCER OF BASE OF TONGUE (HCC): ICD-10-CM

## 2021-09-30 LAB
BUN BLDV-MCNC: 14 MG/DL (ref 6–23)
CREAT SERPL-MCNC: 0.8 MG/DL (ref 0.7–1.2)
GFR AFRICAN AMERICAN: >60
GFR NON-AFRICAN AMERICAN: >60 ML/MIN/1.73

## 2021-09-30 PROCEDURE — 6360000004 HC RX CONTRAST MEDICATION: Performed by: SPECIALIST

## 2021-09-30 PROCEDURE — 77263 THER RADIOLOGY TX PLNG CPLX: CPT | Performed by: SPECIALIST

## 2021-09-30 PROCEDURE — 77334 RADIATION TREATMENT AID(S): CPT | Performed by: SPECIALIST

## 2021-09-30 PROCEDURE — 36415 COLL VENOUS BLD VENIPUNCTURE: CPT

## 2021-09-30 PROCEDURE — 82565 ASSAY OF CREATININE: CPT

## 2021-09-30 PROCEDURE — 84520 ASSAY OF UREA NITROGEN: CPT

## 2021-09-30 RX ADMIN — IOPAMIDOL 120 ML: 755 INJECTION, SOLUTION INTRAVENOUS at 13:42

## 2021-09-30 NOTE — TELEPHONE ENCOUNTER
Rad onc nurse called and no answer, pt did not have labs drawn or show for his appointment. Left a message.

## 2021-10-05 ENCOUNTER — TELEPHONE (OUTPATIENT)
Dept: CASE MANAGEMENT | Age: 66
End: 2021-10-05

## 2021-10-05 DIAGNOSIS — C76.0 PRIMARY SQUAMOUS CELL CARCINOMA OF HEAD AND NECK (HCC): ICD-10-CM

## 2021-10-05 NOTE — TELEPHONE ENCOUNTER
Patient was unaware of his consult appointment today with Medical Oncology. Called patient and explained need for the consult. He is agreeable to have appointment scheduled. Dionne Ramesh, Reception with Dr Edris Osgood. Patient is now scheduled for consult with Dr Edris Osgood on Friday 10/8/21 at 1315. Patient provided with appointment information including doctor, date, time, location and directions for parking and entrance. Patient verbalized understanding and agreeable to appointment.

## 2021-10-07 PROCEDURE — 77338 DESIGN MLC DEVICE FOR IMRT: CPT | Performed by: SPECIALIST

## 2021-10-07 PROCEDURE — 77301 RADIOTHERAPY DOSE PLAN IMRT: CPT | Performed by: SPECIALIST

## 2021-10-07 PROCEDURE — 77300 RADIATION THERAPY DOSE PLAN: CPT | Performed by: SPECIALIST

## 2021-10-08 ENCOUNTER — OFFICE VISIT (OUTPATIENT)
Dept: ONCOLOGY | Age: 66
End: 2021-10-08
Payer: MEDICARE

## 2021-10-08 ENCOUNTER — HOSPITAL ENCOUNTER (OUTPATIENT)
Dept: RADIATION ONCOLOGY | Age: 66
Discharge: HOME | End: 2021-10-08
Attending: SPECIALIST
Payer: MEDICARE

## 2021-10-08 ENCOUNTER — TELEPHONE (OUTPATIENT)
Dept: CASE MANAGEMENT | Age: 66
End: 2021-10-08

## 2021-10-08 ENCOUNTER — HOSPITAL ENCOUNTER (OUTPATIENT)
Dept: INFUSION THERAPY | Age: 66
Discharge: HOME | End: 2021-10-08
Payer: MEDICARE

## 2021-10-08 VITALS
OXYGEN SATURATION: 93 % | TEMPERATURE: 97.3 F | SYSTOLIC BLOOD PRESSURE: 138 MMHG | HEART RATE: 62 BPM | DIASTOLIC BLOOD PRESSURE: 85 MMHG

## 2021-10-08 DIAGNOSIS — C76.0 PRIMARY SQUAMOUS CELL CARCINOMA OF HEAD AND NECK (HCC): Primary | ICD-10-CM

## 2021-10-08 PROCEDURE — 99214 OFFICE O/P EST MOD 30 MIN: CPT

## 2021-10-08 PROCEDURE — 99205 OFFICE O/P NEW HI 60 MIN: CPT | Performed by: INTERNAL MEDICINE

## 2021-10-08 RX ORDER — OXYCODONE AND ACETAMINOPHEN 7.5; 325 MG/1; MG/1
1 TABLET ORAL EVERY 8 HOURS PRN
COMMUNITY
Start: 2021-10-07

## 2021-10-08 SDOH — ECONOMIC STABILITY: HOUSING INSECURITY: PLEASE ASSESS YOUR PATIENT'S LEVEL OF DISTRESS CONCERNING HOUSING (SCALE FROM 1-10): 8

## 2021-10-08 NOTE — PROGRESS NOTES
headache. Remainder:  ROS NEGATIVE    Past Medical History:      Diagnosis Date    Cancer [facility] [address]     Tongue    COPD (chronic obstructive pulmonary disease) [facility] [address]     History of hepatitis C 2019    treated-2019    Hoarseness of voice     For OR 7-12-21    Mouth sores     Tongue mass     left     Patient Active Problem List   Diagnosis    COPD (chronic obstructive pulmonary disease) [facility] [address]    Lumbar radiculopathy    Hepatitis C without hepatic coma    Tongue mass    Primary squamous cell carcinoma of head and neck ([facility] [address]        Past Surgical History:      Procedure Laterality Date    FRACTURE SURGERY      KNEE ARTHROSCOPY Left 1980    LARYNGOSCOPY N/A 7/12/2021    PANENDOSCOPY BRONCHOSCOPY ESOPHAGOSCOPY MICROSUSPENSION DIRECT LARYNGOSCOPY performed by Daria Monroy DO at [facility] N/A 7/12/2021    BIOPSY OF TONGUE BASE WITH FROZEN SECTION AND LEFT TRUE VOCAL CORD BIOPSY performed by Daria Monroy DO at [facility] OR       Family History:  Family History   Problem Relation Age of Onset    Cancer Father         lung       Medications:  Reviewed and reconciled. Social History:  Social History     Socioeconomic History    Marital status: Single     Spouse name: Not on file    Number of children: 0    Years of education: Not on file    Highest education level: Not on file   Occupational History    Occupation: dairy compressor   Tobacco Use    Smoking status: Current Every Day Smoker     Packs/day: 0.50     Years: 45.00     Pack years: 22.50     Types: Cigarettes    Smokeless tobacco: Never Used   Substance and Sexual Activity    Alcohol use:  Yes     Alcohol/week: 1.0 standard drinks     Types: 1 Cans of beer per week     Comment: i beer/day    Drug use: Never    Sexual activity: Not on file   Other Topics Concern    Not on file   Social History Narrative    Not on file     Social Determinants of Health     Financial Resource Strain:     Difficulty of Paying Living Expenses:    Food Insecurity:     Worried About [address] [employer] in the Last Year:     [address] in the Last Year:    Transportation Needs:     Lack of Transportation (Medical):  Lack of Transportation (Non-Medical):    Physical Activity:     Days of Exercise per Week:     Minutes of Exercise per Session:    Stress:     Feeling of Stress :    Social Connections:     Frequency of Communication with Friends and Family:     Frequency of Social Gatherings with Friends and Family:     Attends [employer] Services:     Active Member of Clubs or Organizations:     Attends Club or Organization Meetings:     Marital Status:    Intimate Partner Violence:     Fear of Current or Ex-Partner:     Emotionally Abused:     Physically Abused:     Sexually Abused: Allergies:  No Known Allergies    Physical Exam:  /85   Pulse 62   Temp 97.3 °F (36.3 °C)   SpO2 93%   GENERAL: Alert, oriented x 3, not in acute distress. HEENT: PERRLA; EOMI. Positive for trismus, limited motion of the tongue. NECK: Supple. He has fullness of the left neck. LUNGS: Good air entry bilaterally. No wheezing, crackles or rhonchi. CARDIOVASCULAR: Regular rate. No murmurs, rubs or gallops. ABDOMEN: Soft. Non-tender, non-distended. Positive bowel sounds. EXTREMITIES: Without clubbing, cyanosis, or edema. NEUROLOGIC: No focal deficits.    ECOG PS 1    Impression/Plan:       Mr. Dereje Johnson is a pleasant 78-year-old gentleman, with a past medical history significant for tobacco use disorder, COPD, and hepatitis C status post treatment in 2019, who had presented with left oral tongue lesion, when he developed a left neck mass, he was treated with a short course of antibiotics, which did not help, he subsequently had a work-up done, including a CT of the neck which had revealed an abnormal mass in the lateral aspect of the oral tongue involving a significant portion of the tongue measuring 4 x 2 x 2.8 cm, a level 2A lymph node was also noted, The patient underwent a laryngoscopy on 7/19/2021 which revealed a benign-appearing polyp on the left true vocal cord but a mass involving the left tonsil fossa. This was biopsied and found to be a invasive squamous cell carcinoma p16-positive. PET/CT was done on 8/3/2021, revealing a masslike structure involving the left tongue extending to the posterior pharyngeal wall crossing the midline. There was also metabolic activity noted in a zone 3 node on the ipsilateral side, clinical stage [id number] disease. I discussed with the patient his diagnosis, prognosis, as well as treatment with definitive concurrent chemo/RT using weekly cisplatin, the side effects and the schedule of the treatment were reviewed with the patient. He will have a chemo teach. The patient is agreeable to have a PEG placed, will refer to surgery for a PEG and port placement. Referral was placed to palliative medicine for symptoms management. All the patient's questions were answered to his apparent satisfaction. RTC with chemo/RT start. Thank you for allowing us to participate in the care of Mr. Aj Patel.     Orlando Cobb MD   HEMATOLOGY/MEDICAL ONCOLOGY  [address] [place] [address] MED ONCOLOGY  [facility] [address] [facility] [id number]  Dept: [phone]

## 2021-10-08 NOTE — TELEPHONE ENCOUNTER
Met with patient during his initial consultation with Dr. Byron Mendoza  for his  recent Tongue cancer diagnosis. Introduced myself and explained my role with patients receiving treatment at our center. Patient was friendly and receptive. He has already had all of his teeth pulled for dental clearance. He met with Radiation Oncology and had his [address] for radiation planning. He denies any issues with eating but does have some pain in his mouth from having his teeth pulled. He originally had about 50 lb weight loss. Copy of his pathology findings given including cancer type. Instructed on next steps including PEG and Mediport referral and Concurrent Chemo/Radiation treatments per Dr. Evan Wyatt's recommendations and follow up care. Reviewed information on chemotherapy. Chemo teaching given to patient. \"Chemo and You\" booklet given. Written \"Instructions after receiving Chemotherapy\" given. Provided patient with  literature  on Chemotherapy and You, Patient Resource Head and Neck Cancer, Oral Care Cancer and You, Chemo Care handout on Cisplatin, Instructions on after Chemo handout, Dental Recommendations handout for chemo/RT and Community Transportation Resource List. Reviewed resources available including Social Work, Dietician, and Financial Navigator. Answered questions to their apparent satisfaction. Provided with my contact information and instructed patient to call me with questions or concerns. Verbalizes understanding. Patient appreciative of visit. Will continue to follow.

## 2021-10-08 NOTE — PROGRESS NOTES
Shady Ku  [birth year] 77 y.o. Referring Physician: Dr Ivana Curry    PCP: Jose Brownlee MD    Vitals:    10/08/21 1326   BP: 138/85   Pulse: 62   Temp: 97.3 °F (36.3 °C)   SpO2: 93%        Wt Readings from Last 3 Encounters:   09/15/21 180 lb (81.6 kg)   08/10/21 179 lb 1.6 oz (81.2 kg)   08/05/21 194 lb (88 kg)        There is no height or weight on file to calculate BMI. Chief Complaint:   Chief Complaint   Patient presents with    Cancer     tongue    New Patient         Cancer Staging  No matching staging information was found for the patient. Prior Radiation Therapy? NO    Concurrent Chemo/radiation? NO    Prior Chemotherapy? NO    Prior Hormonal Therapy? NO    Head and Neck Cancer? Yes, patient has HN cancer AND consulting physician AGREES to place MBSS and speech therapy referral.        Current Outpatient Medications:     oxyCODONE-acetaminophen (PERCOCET) 7.5-325 MG per tablet, , Disp: , Rfl:     diazePAM (VALIUM) 5 MG tablet, Take 5 mg by mouth 2 times daily.  , Disp: , Rfl:     albuterol sulfate  (90 Base) MCG/ACT inhaler, INHALE 2 PUFFS INTO THE LUNGS EVERY 6 HOURS AS NEEDED FOR WHEEZING, Disp: 54 g, Rfl: 3    ADVAIR DISKUS 250-50 MCG/DOSE AEPB, Inhale 1 puff into the lungs 2 times daily , Disp: , Rfl:     ibuprofen (ADVIL;MOTRIN) 600 MG tablet, , Disp: , Rfl:        Past Medical History:   Diagnosis Date    Cancer ([facility] [address]     Tongue    COPD (chronic obstructive pulmonary disease) ([facility] [address]     History of hepatitis C 2019    treated-2019    Hoarseness of voice     For OR 7-12-21    Mouth sores     Tongue mass     left       Past Surgical History:   Procedure Laterality Date    FRACTURE SURGERY      KNEE ARTHROSCOPY Left 1980    LARYNGOSCOPY N/A 7/12/2021    PANENDOSCOPY BRONCHOSCOPY ESOPHAGOSCOPY MICROSUSPENSION DIRECT LARYNGOSCOPY performed by [facility] Erica Donohue DO at [facility] [place] N/A 7/12/2021    BIOPSY OF TONGUE BASE WITH FROZEN SECTION AND LEFT TRUE VOCAL CORD BIOPSY performed by Ada Sheth DO at [facility] OR       Family History   Problem Relation Age of Onset    Cancer Father         lung       Social History     Socioeconomic History    Marital status: Single     Spouse name: Not on file    Number of children: 0    Years of education: Not on file    Highest education level: Not on file   Occupational History    Occupation: dairy compressor   Tobacco Use    Smoking status: Current Every Day Smoker     Packs/day: 0.50     Years: 45.00     Pack years: 22.50     Types: Cigarettes    Smokeless tobacco: Never Used   Substance and Sexual Activity    Alcohol use: Yes     Alcohol/week: 1.0 standard drinks     Types: 1 Cans of beer per week     Comment: i beer/day    Drug use: Never    Sexual activity: Not on file   Other Topics Concern    Not on file   Social History Narrative    Not on file     Social Determinants of Health     Financial Resource Strain:     Difficulty of Paying Living Expenses:    Food Insecurity:     Worried About Running Out of Food in the Last Year:     [address] in the Last Year:    Transportation Needs:     Lack of Transportation (Medical):  Lack of Transportation (Non-Medical):    Physical Activity:     Days of Exercise per Week:     Minutes of Exercise per Session:    Stress:     Feeling of Stress :    Social Connections:     Frequency of Communication with Friends and Family:     Frequency of Social Gatherings with Friends and Family:     Attends Scientology Services:     Active Member of Clubs or Organizations:     Attends Club or Organization Meetings:     Marital Status:    Intimate Partner Violence:     Fear of Current or Ex-Partner:     Emotionally Abused:     Physically Abused:     Sexually Abused:            Occupation: Retired Dairy Compressor  Retired:  YES: Patient is retired from [employer].           REVIEW OF SYSTEMS: <<For Level 5, 10 or more systems>> Pacemaker/Defibulator/ICD:  No    Mediport: No           FALLS RISK SCREENING ASSESSMENT    Instructions:  Assess the patient and [place] the appropriate indicators that are present for fall risk identification. Total the numbers circled and assign a fall risk score from Table 2.  Reassess patient at a minimum every 12 weeks or with status change. Assessment   Date  10/8/2021     1. Mental Ability: confusion/cognitively impaired No - 0       2. Elimination Issues: incontinence, frequency No - 0       3. Ambulatory: use of assistive devices (walker, cane, off-loading devices), attached to equipment (IV pole, oxygen) No - 0     4. Sensory Limitations: dizziness, vertigo, impaired vision No - 0       5. Age 72 years or greater - 1       10. Medication: diuretics, strong analgesics, hypnotics, sedatives, antihypertensive agents   No - 0   7. Falls:  recent history of falls within the last 3 months (not to include slipping or tripping)   No - 0   TOTAL 1    If score of 4 or greater was education given? No       TABLE 2   Risk Score Risk Level Plan of Care   0-3 Little or  No Risk 1. Provide assistance as indicated for ambulation activities  2. Reorient confused/cognitively impaired patient  3. Call-light/bell within patient's reach  4. Chair/bed in low position, stretcher/bed with siderails up except when performing patient care activities  5. Educate patient/family/caregiver on falls prevention  6.  Reassess in 12 weeks or with any noted change in patient condition which places them at a risk for a fall   4-6 Moderate Risk 1. Provide assistance as indicated for ambulation activities  2. Reorient confused/cognitively impaired patient  3. Call-light/bell within patient's reach  4. Chair/bed in low position, stretcher/bed with siderails up except when performing patient care activities  5. Educate patient/family/caregiver on falls prevention  6.   Falls risk precaution (Yellow sticker Level II) placed on patient chart   7 or   Higher High Risk 1. Place patient in easily observable treatment room  2. Patient attended at all times by family member or staff  3. Provide assistance as indicated for ambulation activities  4. Reorient confused/cognitively impaired patient  5. Call-light/bell within patient's reach  6. Chair/bed in low position, stretcher/bed with siderails up except when performing patient care activities  7. Educate patient/family/caregiver on falls prevention  8. Falls risk precaution (Yellow sticker Level III) placed on patient chart           MALNUTRITION RISK SCREENING ASSESSMENT    Instructions:  Assess the patient and enter the appropriate indicators that are present for nutrition risk identification. Total the numbers entered and assign a risk score. Follow the appropriate action for total score listed below. Assessment   Date  10/8/2021     1. Have you lost weight without trying? 4- Yes, >15 kg     2. Have you been eating poorly because of a decreased appetite? 0- No   3. Do you have a diagnosis of head and neck cancer?       2- Yes                                                                                    TOTAL 6        Score of 0-1: No action  Score 2 or greater:  · For Non-Diabetic Patient: Recommend adding Ensure Enlive 2 x daily and provide patient with Ensure wellness bag with coupons  · For Diabetic Patient: Recommend adding Glucerna Shake 2 x daily and provide patient with Glucerna Wellness bag with coupons  · Route to the dietitian via [employer] [address]    · Are you having  difficulty performing daily routine tasks  due to fatigue or weakness (ie: bathing/showering, dressing, housework, meal prep, work, child Guzman Brood): No     · Do you have any arm flexibility/ROM restrictions, swelling or pain that limit activity: No     · Any changes in memory, attention/focus that impact daily activities: No     · Do you avoid participation in leisure/social activity due weakness, fatigue or pain: No     ARE ANY OF THE ABOVE ARE ANSWERED YES: No          PT ASSESSMENT FOR REFERRAL    · Have you had any recent falls in past 2 months: No     · Do you have difficulty  going up/down stairs: No     · Are you having difficulty walking: No     · Do you often hold onto furniture/environmental supports or feel off balance when you are walking: No     · Do you need to take rest breaks when you are walking: No     · Any pain on scale of 1-10 that limits your mobility: No 0/10    ARE ANY OF THE ABOVE ARE ANSWERED YES: No          PREHAB AUDIOLOGY REFERRAL    - Is patient planned to receive Cisplatin? Yes. Audiology referral request sent to Dr Vignesh Webb MD.    - Is patient complaining of new onset hearing loss? No. Patient is not complaining of new onset hearing loss.         Colt Maldonado RN

## 2021-10-11 ENCOUNTER — TELEPHONE (OUTPATIENT)
Dept: ONCOLOGY | Age: 66
End: 2021-10-11

## 2021-10-11 NOTE — TELEPHONE ENCOUNTER
Patient upset that he did not have transportation for 10/12/21, follow up and chemo treatment, with labs first @ 08:00 am.  Patient wanted to reschedule, gave him option to use [employer] service instead of rescheduling. Patient agreed. Faxed hard copy vouchers to [employer] and called today, 10/11/21, to verify [employer] will  patient 10/12/21 and have him @ our office by 08:00 am.  [employer] confirmed they received faxed vouchers and will  patient. Also stated will give patient hard copy vouchers to give to .

## 2021-10-12 ENCOUNTER — OFFICE VISIT (OUTPATIENT)
Dept: ONCOLOGY | Age: 66
End: 2021-10-12
Payer: MEDICARE

## 2021-10-12 ENCOUNTER — HOSPITAL ENCOUNTER (OUTPATIENT)
Dept: RADIATION ONCOLOGY | Age: 66
Discharge: HOME | End: 2021-10-12
Attending: SPECIALIST
Payer: MEDICARE

## 2021-10-12 ENCOUNTER — HOSPITAL ENCOUNTER (OUTPATIENT)
Dept: INFUSION THERAPY | Age: 66
Discharge: HOME | End: 2021-10-12
Payer: MEDICARE

## 2021-10-12 VITALS
HEIGHT: 67 IN | HEART RATE: 85 BPM | WEIGHT: 154 LBS | SYSTOLIC BLOOD PRESSURE: 138 MMHG | DIASTOLIC BLOOD PRESSURE: 88 MMHG | TEMPERATURE: 97.5 F | BODY MASS INDEX: 24.17 KG/M2 | OXYGEN SATURATION: 94 %

## 2021-10-12 VITALS — TEMPERATURE: 97.8 F | SYSTOLIC BLOOD PRESSURE: 157 MMHG | HEART RATE: 57 BPM | DIASTOLIC BLOOD PRESSURE: 89 MMHG

## 2021-10-12 DIAGNOSIS — C76.0 PRIMARY SQUAMOUS CELL CARCINOMA OF HEAD AND NECK (HCC): Primary | ICD-10-CM

## 2021-10-12 LAB
ALBUMIN SERPL-MCNC: 3.8 G/DL (ref 3.5–5.2)
ALP BLD-CCNC: 80 U/L (ref 40–129)
ALT SERPL-CCNC: 11 U/L (ref 0–40)
ANION GAP SERPL CALCULATED.3IONS-SCNC: 6 MMOL/L (ref 7–16)
AST SERPL-CCNC: 17 U/L (ref 0–39)
BASOPHILS ABSOLUTE: 0.15 E9/L (ref 0–0.2)
BASOPHILS RELATIVE PERCENT: 2 % (ref 0–2)
BILIRUB SERPL-MCNC: 0.6 MG/DL (ref 0–1.2)
BUN BLDV-MCNC: 10 MG/DL (ref 6–23)
CALCIUM SERPL-MCNC: 10.6 MG/DL (ref 8.6–10.2)
CHLORIDE BLD-SCNC: 96 MMOL/L (ref 98–107)
CO2: 34 MMOL/L (ref 22–29)
CREAT SERPL-MCNC: 0.9 MG/DL (ref 0.7–1.2)
EOSINOPHILS ABSOLUTE: 0.79 E9/L (ref 0.05–0.5)
EOSINOPHILS RELATIVE PERCENT: 10.6 % (ref 0–6)
GFR AFRICAN AMERICAN: >60
GFR NON-AFRICAN AMERICAN: >60 ML/MIN/1.73
GLUCOSE BLD-MCNC: 113 MG/DL (ref 74–99)
HCT VFR BLD CALC: 45.9 % (ref 37–54)
HEMOGLOBIN: 15.7 G/DL (ref 12.5–16.5)
IMMATURE GRANULOCYTES #: 0.02 E9/L
IMMATURE GRANULOCYTES %: 0.3 % (ref 0–5)
LYMPHOCYTES ABSOLUTE: 1.36 E9/L (ref 1.5–4)
LYMPHOCYTES RELATIVE PERCENT: 18.2 % (ref 20–42)
MAGNESIUM: 2 MG/DL (ref 1.6–2.6)
MAGNESIUM: 2.1 MG/DL (ref 1.6–2.6)
MCH RBC QN AUTO: 31.4 PG (ref 26–35)
MCHC RBC AUTO-ENTMCNC: 34.2 % (ref 32–34.5)
MCV RBC AUTO: 91.8 FL (ref 80–99.9)
MONOCYTES ABSOLUTE: 0.75 E9/L (ref 0.1–0.95)
MONOCYTES RELATIVE PERCENT: 10 % (ref 2–12)
NEUTROPHILS ABSOLUTE: 4.41 E9/L (ref 1.8–7.3)
NEUTROPHILS RELATIVE PERCENT: 58.9 % (ref 43–80)
PDW BLD-RTO: 13.1 FL (ref 11.5–15)
PLATELET # BLD: 190 E9/L (ref 130–450)
PMV BLD AUTO: 12.1 FL (ref 7–12)
POTASSIUM SERPL-SCNC: 4 MMOL/L (ref 3.5–5)
RBC # BLD: 5 E12/L (ref 3.8–5.8)
REASON FOR REJECTION: NORMAL
REJECTED TEST: NORMAL
SODIUM BLD-SCNC: 136 MMOL/L (ref 132–146)
TOTAL PROTEIN: 7.5 G/DL (ref 6.4–8.3)
WBC # BLD: 7.5 E9/L (ref 4.5–11.5)

## 2021-10-12 PROCEDURE — 77386 HC NTSTY MODUL RAD TX DLVR CPLX: CPT | Performed by: SPECIALIST

## 2021-10-12 PROCEDURE — 80053 COMPREHEN METABOLIC PANEL: CPT

## 2021-10-12 PROCEDURE — 6360000002 HC RX W HCPCS: Performed by: INTERNAL MEDICINE

## 2021-10-12 PROCEDURE — 83735 ASSAY OF MAGNESIUM: CPT

## 2021-10-12 PROCEDURE — 99214 OFFICE O/P EST MOD 30 MIN: CPT | Performed by: INTERNAL MEDICINE

## 2021-10-12 PROCEDURE — 2580000003 HC RX 258: Performed by: INTERNAL MEDICINE

## 2021-10-12 PROCEDURE — 96375 TX/PRO/DX INJ NEW DRUG ADDON: CPT

## 2021-10-12 PROCEDURE — 96413 CHEMO IV INFUSION 1 HR: CPT

## 2021-10-12 PROCEDURE — 96366 THER/PROPH/DIAG IV INF ADDON: CPT

## 2021-10-12 PROCEDURE — 85025 COMPLETE CBC W/AUTO DIFF WBC: CPT

## 2021-10-12 PROCEDURE — 77014 PR CT GUIDANCE PLACEMENT RAD THERAPY FIELDS: CPT | Performed by: SPECIALIST

## 2021-10-12 PROCEDURE — 96415 CHEMO IV INFUSION ADDL HR: CPT

## 2021-10-12 RX ORDER — ONDANSETRON HYDROCHLORIDE 8 MG/1
8 TABLET, FILM COATED ORAL EVERY 12 HOURS PRN
Qty: 30 TABLET | Refills: 1 | Status: SHIPPED | OUTPATIENT
Start: 2021-10-12

## 2021-10-12 RX ORDER — SODIUM CHLORIDE 9 MG/ML
25 INJECTION, SOLUTION INTRAVENOUS PRN
Status: CANCELLED | OUTPATIENT
Start: 2021-10-12

## 2021-10-12 RX ORDER — PALONOSETRON HYDROCHLORIDE 0.05 MG/ML
0.25 INJECTION, SOLUTION INTRAVENOUS ONCE
Status: CANCELLED | OUTPATIENT
Start: 2021-10-12

## 2021-10-12 RX ORDER — HEPARIN SODIUM (PORCINE) LOCK FLUSH IV SOLN 100 UNIT/ML 100 UNIT/ML
500 SOLUTION INTRAVENOUS PRN
Status: DISCONTINUED | OUTPATIENT
Start: 2021-10-12 | End: 2021-10-13 | Stop reason: HOSPADM

## 2021-10-12 RX ORDER — DEXAMETHASONE SODIUM PHOSPHATE 10 MG/ML
10 INJECTION, SOLUTION INTRAMUSCULAR; INTRAVENOUS ONCE
Status: COMPLETED | OUTPATIENT
Start: 2021-10-12 | End: 2021-10-12

## 2021-10-12 RX ORDER — SODIUM CHLORIDE 9 MG/ML
20 INJECTION, SOLUTION INTRAVENOUS ONCE
Status: CANCELLED | OUTPATIENT
Start: 2021-10-12 | End: 2021-10-12

## 2021-10-12 RX ORDER — METHYLPREDNISOLONE SODIUM SUCCINATE 125 MG/2ML
125 INJECTION, POWDER, LYOPHILIZED, FOR SOLUTION INTRAMUSCULAR; INTRAVENOUS ONCE
Status: CANCELLED | OUTPATIENT
Start: 2021-10-12 | End: 2021-10-12

## 2021-10-12 RX ORDER — HEPARIN SODIUM (PORCINE) LOCK FLUSH IV SOLN 100 UNIT/ML 100 UNIT/ML
500 SOLUTION INTRAVENOUS PRN
Status: CANCELLED | OUTPATIENT
Start: 2021-10-12

## 2021-10-12 RX ORDER — SODIUM CHLORIDE 9 MG/ML
INJECTION, SOLUTION INTRAVENOUS CONTINUOUS
Status: CANCELLED | OUTPATIENT
Start: 2021-10-12

## 2021-10-12 RX ORDER — SODIUM CHLORIDE 0.9 % (FLUSH) 0.9 %
5-40 SYRINGE (ML) INJECTION PRN
Status: CANCELLED | OUTPATIENT
Start: 2021-10-12

## 2021-10-12 RX ORDER — SODIUM CHLORIDE 0.9 % (FLUSH) 0.9 %
5-40 SYRINGE (ML) INJECTION PRN
Status: DISCONTINUED | OUTPATIENT
Start: 2021-10-12 | End: 2021-10-13 | Stop reason: HOSPADM

## 2021-10-12 RX ORDER — EPINEPHRINE 1 MG/ML
0.3 INJECTION, SOLUTION, CONCENTRATE INTRAVENOUS PRN
Status: CANCELLED | OUTPATIENT
Start: 2021-10-12

## 2021-10-12 RX ORDER — DIPHENHYDRAMINE HYDROCHLORIDE 50 MG/ML
50 INJECTION INTRAMUSCULAR; INTRAVENOUS ONCE
Status: CANCELLED | OUTPATIENT
Start: 2021-10-12 | End: 2021-10-12

## 2021-10-12 RX ORDER — SODIUM CHLORIDE 9 MG/ML
20 INJECTION, SOLUTION INTRAVENOUS ONCE
Status: DISCONTINUED | OUTPATIENT
Start: 2021-10-12 | End: 2021-10-13 | Stop reason: HOSPADM

## 2021-10-12 RX ORDER — DEXAMETHASONE SODIUM PHOSPHATE 10 MG/ML
10 INJECTION, SOLUTION INTRAMUSCULAR; INTRAVENOUS ONCE
Status: CANCELLED | OUTPATIENT
Start: 2021-10-12

## 2021-10-12 RX ORDER — PALONOSETRON HYDROCHLORIDE 0.05 MG/ML
0.25 INJECTION, SOLUTION INTRAVENOUS ONCE
Status: COMPLETED | OUTPATIENT
Start: 2021-10-12 | End: 2021-10-12

## 2021-10-12 RX ORDER — PROCHLORPERAZINE MALEATE 10 MG
10 TABLET ORAL EVERY 6 HOURS PRN
Qty: 30 TABLET | Refills: 2 | Status: SHIPPED
Start: 2021-10-12 | End: 2022-01-11 | Stop reason: SDUPTHER

## 2021-10-12 RX ADMIN — FOSAPREPITANT 150 MG: 150 INJECTION, POWDER, LYOPHILIZED, FOR SOLUTION INTRAVENOUS at 09:40

## 2021-10-12 RX ADMIN — SODIUM CHLORIDE, PRESERVATIVE FREE 10 ML: 5 INJECTION INTRAVENOUS at 09:29

## 2021-10-12 RX ADMIN — SODIUM CHLORIDE 20 ML/HR: 9 INJECTION, SOLUTION INTRAVENOUS at 09:29

## 2021-10-12 RX ADMIN — DEXAMETHASONE SODIUM PHOSPHATE 10 MG: 10 INJECTION, SOLUTION INTRAMUSCULAR; INTRAVENOUS at 09:29

## 2021-10-12 RX ADMIN — PALONOSETRON 0.25 MG: 0.25 INJECTION, SOLUTION INTRAVENOUS at 09:29

## 2021-10-12 RX ADMIN — POTASSIUM CHLORIDE: 2 INJECTION, SOLUTION, CONCENTRATE INTRAVENOUS at 10:22

## 2021-10-12 NOTE — PROGRESS NOTES
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 Referring Physician:  Morenita Davidson DO    PCP:  aMrco Blanton MD    History of Present Illness:      Mr. Debbie Stanley is a pleasant 66-year-old gentleman, with a past medical history significant for tobacco use disorder, COPD, and hepatitis C status post treatment in 2019, who had presented with left oral tongue lesion, when he developed a left neck mass, he was treated with a short course of antibiotics, which did not help, he subsequently had a work-up done, including a CT of the neck which had revealed an abnormal mass in the lateral aspect of the oral tongue involving a significant portion of the tongue measuring 4 x 2 x 2.8 cm, a level 2A lymph node was also noted, The patient underwent a laryngoscopy on 7/19/2021 which revealed a benign-appearing polyp on the left true vocal cord but a mass involving the left tonsil fossa. This was biopsied and found to be a invasive squamous cell carcinoma p16-positive. PET/CT was done on 8/3/2021, revealing a masslike structure involving the left tongue extending to the posterior pharyngeal wall crossing the midline. There was also metabolic activity noted in a zone 3 node on the ipsilateral side. The patient will be started today 10/12/2021 on concurrent chemoradiation using weekly cisplatin. Review of Systems;  CONSTITUTIONAL: No fever, chills. Decreased appetite and energy level. Pos for weight loss. ENMT: Eyes: No diplopia; Nose: No epistaxis. Mouth: pos for mouth pain, odynophagia and dysphagia. Pos for hoarseness of the voice. RESPIRATORY: No hemoptysis, positive for mild chronic shortness of breath, cough. CARDIOVASCULAR: No chest pain, palpitations.   GASTROINTESTINAL: No nausea/vomiting, abdominal pain, diarrhea/constipation. GENITOURINARY: No dysuria, urinary frequency, hematuria. NEURO: No syncope, presyncope, headache. Remainder:  ROS NEGATIVE    Past Medical History:      Diagnosis Date    Cancer ([address]     Tongue    COPD (chronic obstructive pulmonary disease) ([address]     History of hepatitis C 2019    treated-2019    Hoarseness of voice     For OR 7-12-21    Mouth sores     Tongue mass     left     Patient Active Problem List   Diagnosis    COPD (chronic obstructive pulmonary disease) ([address]    Lumbar radiculopathy    Hepatitis C without hepatic coma    Tongue mass    Primary squamous cell carcinoma of head and neck ([address]        Past Surgical History:      Procedure Laterality Date    FRACTURE SURGERY      KNEE ARTHROSCOPY Left 1980    LARYNGOSCOPY N/A 7/12/2021    PANENDOSCOPY BRONCHOSCOPY ESOPHAGOSCOPY MICROSUSPENSION DIRECT LARYNGOSCOPY performed by Lance Sanders DO at [facility] N/A 7/12/2021    BIOPSY OF TONGUE BASE WITH FROZEN SECTION AND LEFT TRUE VOCAL CORD BIOPSY performed by Lance Sanders DO at [facility] OR       Family History:  Family History   Problem Relation Age of Onset    Cancer Father         lung       Medications:  Reviewed and reconciled. Social History:  Social History     Socioeconomic History    Marital status: Single     Spouse name: Not on file    Number of children: 0    Years of education: Not on file    Highest education level: Not on file   Occupational History    Occupation: dairy compressor   Tobacco Use    Smoking status: Current Every Day Smoker     Packs/day: 0.50     Years: 45.00     Pack years: 22.50     Types: Cigarettes    Smokeless tobacco: Never Used   Vaping Use    Vaping Use: Never used   Substance and Sexual Activity    Alcohol use:  Yes     Alcohol/week: 1.0 standard drinks     Types: 1 Cans of beer per week     Comment: i beer/day    Drug use: Never    Sexual activity: Not on file   Other Topics Concern  Not on file   Social History Narrative    Not on file     Social Determinants of Health     Financial Resource Strain:     Difficulty of Paying Living Expenses:    Food Insecurity:     Worried About Running Out of Food in the Last Year:     [address] in the Last Year:    Transportation Needs:     Lack of Transportation (Medical):  Lack of Transportation (Non-Medical):    Physical Activity:     Days of Exercise per Week:     Minutes of Exercise per Session:    Stress:     Feeling of Stress :    Social Connections:     Frequency of Communication with Friends and Family:     Frequency of Social Gatherings with Friends and Family:     Attends [employer] Services:     Active Member of Clubs or Organizations:     Attends Club or Organization Meetings:     Marital Status:    Intimate Partner Violence:     Fear of Current or Ex-Partner:     Emotionally Abused:     Physically Abused:     Sexually Abused: Allergies:  No Known Allergies    Physical Exam:  /88   Pulse 85   Temp 97.5 °F (36.4 °C)   Ht 5' 7\" (1.702 m)   Wt 154 lb (69.9 kg)   SpO2 94%   BMI 24.12 kg/m²   GENERAL: Alert, oriented x 3, not in acute distress. HEENT: PERRLA; EOMI. Positive for trismus, limited motion of the tongue. NECK: Supple. He has fullness of the left neck. LUNGS: Good air entry bilaterally. No wheezing, crackles or rhonchi. CARDIOVASCULAR: Regular rate. No murmurs, rubs or gallops. ABDOMEN: Soft. Non-tender, non-distended. Positive bowel sounds. EXTREMITIES: Without clubbing, cyanosis, or edema. NEUROLOGIC: No focal deficits.    ECOG PS 1    Impression/Plan:       Mr. Rachel Mckeon is a pleasant 27-year-old gentleman, with a past medical history significant for tobacco use disorder, COPD, and hepatitis C status post treatment in 2019, who had presented with left oral tongue lesion, when he developed a left neck mass, he was treated with a short course of antibiotics, which did not help, he subsequently had a work-up done, including a CT of the neck which had revealed an abnormal mass in the lateral aspect of the oral tongue involving a significant portion of the tongue measuring 4 x 2 x 2.8 cm, a level 2A lymph node was also noted, The patient underwent a laryngoscopy on 7/19/2021 which revealed a benign-appearing polyp on the left true vocal cord but a mass involving the left tonsil fossa. This was biopsied and found to be a invasive squamous cell carcinoma p16-positive. PET/CT was done on 8/3/2021, revealing a masslike structure involving the left tongue extending to the posterior pharyngeal wall crossing the midline. There was also metabolic activity noted in a zone 3 node on the ipsilateral side, clinical stage [id number] disease. I discussed with the patient his diagnosis, prognosis, as well as treatment with definitive concurrent chemo/RT using weekly cisplatin, the side effects and the schedule of the treatment were reviewed with the patient. The patient is agreeable to have a PEG placed, the patient was referred to surgery for a PEG and port placement. Referral was placed to palliative medicine for symptoms management. The patient will be started today 10/12/2021 on concurrent chemoradiation, he will receive cisplatin, cycle #1. Zofran and Compazine prescriptions were sent to his pharmacy, he will have chemo teach. RTC in 1 week. Thank you for allowing us to participate in the care of Mr. Dereje Johnson.     Sandra Tillman MD   HEMATOLOGY/MEDICAL ONCOLOGY  [address] [place] [address] ONCOLOGY  [facility] [place] [address]  [address] [facility] [address]  Dept: [phone]

## 2021-10-12 NOTE — PROGRESS NOTES
Kathryn Donato Jes  10/12/2021  Wt Readings from Last 10 Encounters:   10/12/21 154 lb (69.9 kg)   09/15/21 180 lb (81.6 kg)   08/10/21 179 lb 1.6 oz (81.2 kg)   08/05/21 194 lb (88 kg)   07/23/21 195 lb (88.5 kg)   07/09/21 195 lb (88.5 kg)   07/09/21 195 lb (88.5 kg)   05/26/21 195 lb (88.5 kg)   07/29/20 202 lb 9.6 oz (91.9 kg)   10/07/19 190 lb (86.2 kg)     Ht Readings from Last 1 Encounters:   10/12/21 5' 7\" (1.702 m)     Body mass index is 24.12 kg/m². Met with patient today for introduction to this clinicians role during treatment. Patient states he is feeling well today, denies n/v/d/c. He reports a fair appetite, he does admit to a 50# weight loss in just  3mo. He reports he can only consume soft foods and liquids. He recently starting taking some supplemental Ensure Original, but not consistently, and has been taking that for only about a week or two. He recently had dental extractions, notes still soreness to his gums. He is agreeable to a PEG, but no one has scheduled him for that or the port. He will need setup with [facility] for teaching for care for daily use. Will not initiate enteral nutrition at this time, encouraged him to utilize homemade protein shakes made with protein powder, as well as premade shakes such as Ensure Plus or enlive. Recommended soft foods fortified with butter, cheese, eggs, etc. He was receptive to all information provided. Follow. Weight change: 50# weight loss in 3mo (25%)  Appetite: fair  N/V/D/C: none  Calculated Needs if applicable: [id number] (70x30-32), 90-100gm PRO (70x1.3), 2300ml (70x32)    Pre-Hab Eligible?: yes        Recommendations: PEG placement for use once XRT continues and ability to meet calorie needs declines; As for now, patient to consume 3 meals, 2 snacks daily with soft, non-irritating foods; Use ONS BID to provide 700kcal, 40gm PRO. Home health care for teaching for daily care and use of PEG, flush with 60cc water daily to maintain. ASPEN GUIDELINES FOR CLINICAL CHARACTERISTICS OF MALNUTRITION IN CHRONIC ILLNESS     Moderate Malnutrition  Severe Malnutrition    Energy intake  <75% energy intake compared to estimated needs for >1month <75% energy intake compared to estimated needs for >1month   Weight changes  5% x 1 month  7.5% x 3 months   10% x 6 months   20% x 1 year  >5% x 1 month  >7.5% x 3 months   >10% x 6 months   >20% x 1 year    Physical findings  Mild   Decrease subcutaneous fat    Decrease muscle mass     Increase fluid/edema   Severe  Decrease subcutaneous fat    Decrease muscle mass     Increase fluid/edema      Severe malnutrition evidenced by 25% weight loss in 3mo, <50% intake x3mo.       Miguel Dhaliwal, RD, LD

## 2021-10-13 ENCOUNTER — TELEPHONE (OUTPATIENT)
Dept: ADMINISTRATIVE | Age: 66
End: 2021-10-13

## 2021-10-13 ENCOUNTER — HOSPITAL ENCOUNTER (OUTPATIENT)
Dept: RADIATION ONCOLOGY | Age: 66
Discharge: HOME | End: 2021-10-13
Attending: SPECIALIST
Payer: MEDICARE

## 2021-10-13 ENCOUNTER — TELEPHONE (OUTPATIENT)
Dept: CASE MANAGEMENT | Age: 66
End: 2021-10-13

## 2021-10-13 PROCEDURE — 77386 HC NTSTY MODUL RAD TX DLVR CPLX: CPT | Performed by: SPECIALIST

## 2021-10-13 PROCEDURE — 77014 PR CT GUIDANCE PLACEMENT RAD THERAPY FIELDS: CPT | Performed by: SPECIALIST

## 2021-10-13 NOTE — TELEPHONE ENCOUNTER
Patient called in needing to reschedule his \"1 month throat check\" from next Wednesday 10/20. He has several conflicts with his radiation. He prefers Thursdays if possible. I couldn't find anything in the near future for him and didn't want to push his appointment out too far. Please advise on scheduling. He has treatment tomorrow morning and prefers to be called later morning and anytime in afternoon.

## 2021-10-14 ENCOUNTER — TELEPHONE (OUTPATIENT)
Dept: SURGERY | Age: 66
End: 2021-10-14

## 2021-10-14 ENCOUNTER — HOSPITAL ENCOUNTER (OUTPATIENT)
Dept: RADIATION ONCOLOGY | Age: 66
Discharge: HOME | End: 2021-10-14
Attending: SPECIALIST
Payer: MEDICARE

## 2021-10-14 VITALS
HEART RATE: 65 BPM | TEMPERATURE: 97.8 F | BODY MASS INDEX: 24.86 KG/M2 | SYSTOLIC BLOOD PRESSURE: 128 MMHG | RESPIRATION RATE: 18 BRPM | OXYGEN SATURATION: 98 % | WEIGHT: 158.7 LBS | DIASTOLIC BLOOD PRESSURE: 68 MMHG

## 2021-10-14 PROCEDURE — 77014 PR CT GUIDANCE PLACEMENT RAD THERAPY FIELDS: CPT | Performed by: SPECIALIST

## 2021-10-14 PROCEDURE — 77386 HC NTSTY MODUL RAD TX DLVR CPLX: CPT | Performed by: SPECIALIST

## 2021-10-14 ASSESSMENT — PAIN DESCRIPTION - ORIENTATION: ORIENTATION: LEFT

## 2021-10-14 ASSESSMENT — PAIN SCALES - GENERAL: PAINLEVEL_OUTOF10: 8

## 2021-10-14 ASSESSMENT — PAIN DESCRIPTION - LOCATION: LOCATION: HEAD;NECK

## 2021-10-14 NOTE — TELEPHONE ENCOUNTER
MA left message informing patient Dr. Jael Do can place his mediport on Monday 10/18/2021 @ 11:00am. Awaiting call back from patient to confirm date and time. Electronically signed by Jono John on 10/14/21 at 3:04 PM EDT    Patient called back and confirmed date and time.   Electronically signed by Jono John on 10/14/21 at 3:09 PM EDT

## 2021-10-14 NOTE — PROGRESS NOTES
DEPARTMENT OF RADIATION ONCOLOGY   ON TREATMENT VISIT       10/14/2021      NAME:  Ivy Andre    YOB: 1955    DIAGNOSIS: [id number] SCC left oral tongue    SUBJECTIVE:   Saida Barajas has now received 600 cGy in 3/37 fractions directed to the left oral tongue and bilateral manuela-neck      Past medical, surgical, social and family histories reviewed and updated as indicated. PAIN: 0/10    ALLERGIES:  Patient has no known allergies. Current Outpatient Medications   Medication Sig Dispense Refill    ondansetron (ZOFRAN) 8 MG tablet Take 1 tablet by mouth every 12 hours as needed for Nausea or Vomiting 30 tablet 1    prochlorperazine (COMPAZINE) 10 MG tablet Take 1 tablet by mouth every 6 hours as needed (nausea, vomiting) 30 tablet 2    oxyCODONE-acetaminophen (PERCOCET) 7.5-325 MG per tablet       ibuprofen (ADVIL;MOTRIN) 600 MG tablet       diazePAM (VALIUM) 5 MG tablet Take 5 mg by mouth 2 times daily.  albuterol sulfate  (90 Base) MCG/ACT inhaler INHALE 2 PUFFS INTO THE LUNGS EVERY 6 HOURS AS NEEDED FOR WHEEZING 54 g 3    ADVAIR DISKUS 250-50 MCG/DOSE AEPB Inhale 1 puff into the lungs 2 times daily        No current facility-administered medications for this encounter. OBJECTIVE:  Alert and fully ambulatory. Pleasant and conversant. Physical Examination:General appearance - alert, well appearing, and in no distress:  Constitutional: A well developed, well nourished 77 y.o. male who is alert, oriented, cooperative and in no apparent distress. HEENT: Clear and without side effects or infection. Skin:  Warm and dry. No obvious rashes.     Vitals:    10/14/21 0858   BP: 128/68   Pulse: 65   Resp: 18   Temp: 97.8 °F (36.6 °C)   TempSrc: Temporal   SpO2: 98%   Weight: 158 lb 11.2 oz (72 kg)       Wt Readings from Last 3 Encounters:   10/14/21 158 lb 11.2 oz (72 kg)   10/12/21 154 lb (69.9 kg)   09/15/21 180 lb (81.6 kg) ASSESSMENT/PLAN:     Patient is tolerating treatments well with expected toxicities. Instructed to avoid the use of razor blade in shaving and given skin care instructions. Current and planned dose reviewed. Goals of treatment and potential side effects were reviewed with the patient. Treatment imaging has been personally reviewed for accuracy and precision. Questions answered to apparent satisfaction. Treatments will continue as planned. Thank you for the opportunity to participate in multidisciplinary management of this remarkable and pleasant patient.       Annia Pearson MD, [facility] Sandoval [facility], Byron Medrano, [address] [place] [address]    Department of Radiation Oncology  [facility]) [facility]: [phone] (IUN: [phone])  [facility]) [facility]: [phone] ([phone])  [facility]) [facility]:  [phone] (WRE:  [phone])

## 2021-10-14 NOTE — PROGRESS NOTES
Jemima Gonzalez Jes  10/14/2021  Wt Readings from Last 10 Encounters:   10/14/21 158 lb 11.2 oz (72 kg)   10/12/21 154 lb (69.9 kg)   09/15/21 180 lb (81.6 kg)   08/10/21 179 lb 1.6 oz (81.2 kg)   08/05/21 194 lb (88 kg)   07/23/21 195 lb (88.5 kg)   07/09/21 195 lb (88.5 kg)   07/09/21 195 lb (88.5 kg)   05/26/21 195 lb (88.5 kg)   07/29/20 202 lb 9.6 oz (91.9 kg)     Ht Readings from Last 1 Encounters:   10/12/21 5' 7\" (1.702 m)     Body mass index is 24.86 kg/m². Met with patient today for follow up, this is his first week of XRT. Tolerating well, denies n/v/d/c. He continues to eat soft foods. Weight is up 4# from earlier in the week. Patient continues to consume soups, noodles, puddings, etc. Drinking ONS as well. He reports he still has not heard from surgery for his Mediport or PEG insertion. This clinician had left message with outpatient surgery, however, patient still has not heard. This clinician called again to speak with Dr. Kerry Baker MA. She did note that the order was in there, the patient will be called in order to schedule today. Patient will need setup for [facility] for teaching and care for daily use. Weight change: 50# weight loss in 3mo (25%)  Appetite: fair  N/V/D/C: none  Calculated Needs if applicable: [id number] (70x30-32), 90-100gm PRO (70x1.3), 2300ml (70x32)     Pre-Hab Eligible?: yes           Recommendations: PEG placement for use once XRT continues and ability to meet calorie needs declines; As for now, patient to consume 3 meals, 2 snacks daily with soft, non-irritating foods; Use ONS BID to provide 700kcal, 40gm PRO.      Home Health Care for [facility] teaching for daily use and Mediport care.   .                  ASPEN GUIDELINES FOR CLINICAL CHARACTERISTICS OF MALNUTRITION IN CHRONIC ILLNESS     Moderate Malnutrition  Severe Malnutrition    Energy intake  <75% energy intake compared to estimated needs for >1month <75% energy intake compared to estimated needs for >1month   Weight changes 5% x 1 month  7.5% x 3 months   10% x 6 months   20% x 1 year  >5% x 1 month  >7.5% x 3 months   >10% x 6 months   >20% x 1 year    Physical findings  Mild   Decrease subcutaneous fat    Decrease muscle mass     Increase fluid/edema   Severe  Decrease subcutaneous fat    Decrease muscle mass     Increase fluid/edema      Severe malnutrition evidenced by 25% weight loss in 3mo, <50% intake x3mo.     Jonatan Helen, RD, LD

## 2021-10-14 NOTE — PROGRESS NOTES
Monalisa Goss Jes  10/14/2021  Wt Readings from Last 3 Encounters:   10/14/21 158 lb 11.2 oz (72 kg)   10/12/21 154 lb (69.9 kg)   09/15/21 180 lb (81.6 kg)     Body mass index is 24.86 kg/m². Treatment Area:[address] 6703    Patient was seen today for weekly visit. Comfort Alteration  KPS:80%  Fatigue: None    Mucous Membrane Alteration  Mucositis Due to Radiation: No  Thrush: No  Voice Changes: No    Nutritional Alteration  Anorexia: No   Nausea: No   Vomiting: No     Skin Alteration   Sensation:no    Radiation Dermatitis:  no    Ventilation Alteration  Cough:No    Emotional  Coping: effective    Injury, potential bleeding or infection: no    Radioprotectant Tolerance  yes            Lab Results   Component Value Date    WBC 7.5 10/12/2021     10/12/2021         /68   Pulse 65   Temp 97.8 °F (36.6 °C) (Temporal)   Resp 18   Wt 158 lb 11.2 oz (72 kg)   SpO2 98%   BMI 24.86 kg/m²   BP within normal range? yes       Assessment/Plan: Pt completed 3/37fx and 600/7400cGy. Pt is tolerating tx well thus far.     Airam Lopez RN

## 2021-10-14 NOTE — TELEPHONE ENCOUNTER
Prior Authorization Form:      DEMOGRAPHICS:                     Patient Name:  Alexis Hubbard  Patient :  1955            Insurance:  Payor: Ivett Mancia / Plan: Mariajose Patel ESSENTIAL/PLUS / Product Type: *No Product type* /   Insurance ID Number:    Payor/Plan Subscr  Sex Relation Sub. Ins. ID Effective Group Num   1.  BCBS MEDICARETheodosia Moment* 3069 Male Self [id number] [address] [id number]                                    [id number]         DIAGNOSIS & PROCEDURE:                       Procedure/Operation: Mediport Placement         CPT Code: 53600    Diagnosis:  Squamous cell carcinoma of head and neck    ICD10 Code: C76.0    Location:  [facility]    Surgeon:  Janet Urbina MD    [facility] INFORMATION:                          Date: 10/18/2021    Time: 11:00am              Anesthesia:  MAC/TIVA                                                       Status:  Outpatient          Electronically signed by iWl Church on 10/14/2021 at 3:09 PM

## 2021-10-15 ENCOUNTER — TELEPHONE (OUTPATIENT)
Dept: PALLATIVE CARE | Age: 66
End: 2021-10-15

## 2021-10-15 ENCOUNTER — TELEPHONE (OUTPATIENT)
Dept: INFUSION THERAPY | Age: 66
End: 2021-10-15

## 2021-10-15 ENCOUNTER — HOSPITAL ENCOUNTER (OUTPATIENT)
Dept: RADIATION ONCOLOGY | Age: 66
Discharge: HOME | End: 2021-10-15
Attending: SPECIALIST
Payer: MEDICARE

## 2021-10-15 PROCEDURE — 77386 HC NTSTY MODUL RAD TX DLVR CPLX: CPT | Performed by: SPECIALIST

## 2021-10-15 PROCEDURE — 77014 PR CT GUIDANCE PLACEMENT RAD THERAPY FIELDS: CPT | Performed by: SPECIALIST

## 2021-10-15 NOTE — TELEPHONE ENCOUNTER
Called and spoke with Teo Elisa regarding referral for Palliative care. Explained Palliative care and location of clinic. Teo Pérez shares that he has many appointments and he does not wish to have another. Offered to see him during treatment next Tuesday 10/19. Teo Pérez declined,. He states he is doing OK.

## 2021-10-18 ENCOUNTER — HOSPITAL ENCOUNTER (OUTPATIENT)
Dept: RADIATION ONCOLOGY | Age: 66
Discharge: HOME | End: 2021-10-18
Attending: SPECIALIST
Payer: MEDICARE

## 2021-10-18 ENCOUNTER — TELEPHONE (OUTPATIENT)
Dept: SURGERY | Age: 66
End: 2021-10-18

## 2021-10-18 PROCEDURE — 77427 RADIATION TX MANAGEMENT X5: CPT | Performed by: SPECIALIST

## 2021-10-18 PROCEDURE — 77014 PR CT GUIDANCE PLACEMENT RAD THERAPY FIELDS: CPT | Performed by: SPECIALIST

## 2021-10-18 PROCEDURE — 77386 HC NTSTY MODUL RAD TX DLVR CPLX: CPT | Performed by: SPECIALIST

## 2021-10-18 PROCEDURE — 77336 RADIATION PHYSICS CONSULT: CPT | Performed by: SPECIALIST

## 2021-10-18 NOTE — TELEPHONE ENCOUNTER
Keyanna Little is scheduled for a Mediport  with Dr Nia Beatty on 10/25/2021 @ 12:30am, arrival time 10:30am.     MA made attempt to contact patient to inform of date and time of procedure. Patient did not answer so MA left message requesting return call to discuss.      Electronically signed by Evelyn Donaldson on 10/18/21 at 3:36 PM EDT

## 2021-10-18 NOTE — TELEPHONE ENCOUNTER
Called and left a detailed voicemail in regards to rescheduling an appointment called x3 no response patient to call in future.

## 2021-10-19 ENCOUNTER — HOSPITAL ENCOUNTER (OUTPATIENT)
Dept: INFUSION THERAPY | Age: 66
Discharge: HOME | End: 2021-10-19
Payer: MEDICARE

## 2021-10-19 ENCOUNTER — HOSPITAL ENCOUNTER (OUTPATIENT)
Dept: RADIATION ONCOLOGY | Age: 66
Discharge: HOME | End: 2021-10-19
Attending: SPECIALIST
Payer: MEDICARE

## 2021-10-19 ENCOUNTER — OFFICE VISIT (OUTPATIENT)
Dept: ONCOLOGY | Age: 66
End: 2021-10-19
Payer: MEDICARE

## 2021-10-19 ENCOUNTER — TELEPHONE (OUTPATIENT)
Dept: SURGERY | Age: 66
End: 2021-10-19

## 2021-10-19 ENCOUNTER — TELEPHONE (OUTPATIENT)
Dept: ONCOLOGY | Age: 66
End: 2021-10-19

## 2021-10-19 VITALS
SYSTOLIC BLOOD PRESSURE: 134 MMHG | DIASTOLIC BLOOD PRESSURE: 67 MMHG | TEMPERATURE: 97 F | RESPIRATION RATE: 16 BRPM | HEART RATE: 67 BPM

## 2021-10-19 VITALS
HEART RATE: 95 BPM | RESPIRATION RATE: 17 BRPM | SYSTOLIC BLOOD PRESSURE: 135 MMHG | WEIGHT: 149.6 LBS | BODY MASS INDEX: 23.43 KG/M2 | DIASTOLIC BLOOD PRESSURE: 85 MMHG | OXYGEN SATURATION: 96 % | TEMPERATURE: 97.4 F

## 2021-10-19 DIAGNOSIS — C76.0 PRIMARY SQUAMOUS CELL CARCINOMA OF HEAD AND NECK (HCC): Primary | ICD-10-CM

## 2021-10-19 LAB
ALBUMIN SERPL-MCNC: 4.5 G/DL (ref 3.5–5.2)
ALP BLD-CCNC: 111 U/L (ref 40–129)
ALT SERPL-CCNC: 32 U/L (ref 0–40)
ANION GAP SERPL CALCULATED.3IONS-SCNC: 8 MMOL/L (ref 7–16)
AST SERPL-CCNC: 28 U/L (ref 0–39)
BASOPHILS ABSOLUTE: 0.09 E9/L (ref 0–0.2)
BASOPHILS RELATIVE PERCENT: 1.2 % (ref 0–2)
BILIRUB SERPL-MCNC: 0.7 MG/DL (ref 0–1.2)
BUN BLDV-MCNC: 16 MG/DL (ref 6–23)
CALCIUM SERPL-MCNC: 11.1 MG/DL (ref 8.6–10.2)
CHLORIDE BLD-SCNC: 93 MMOL/L (ref 98–107)
CO2: 33 MMOL/L (ref 22–29)
CREAT SERPL-MCNC: 0.9 MG/DL (ref 0.7–1.2)
EOSINOPHILS ABSOLUTE: 0.66 E9/L (ref 0.05–0.5)
EOSINOPHILS RELATIVE PERCENT: 9 % (ref 0–6)
GFR AFRICAN AMERICAN: >60
GFR NON-AFRICAN AMERICAN: >60 ML/MIN/1.73
GLUCOSE BLD-MCNC: 117 MG/DL (ref 74–99)
HCT VFR BLD CALC: 48.2 % (ref 37–54)
HEMOGLOBIN: 16.4 G/DL (ref 12.5–16.5)
IMMATURE GRANULOCYTES #: 0.05 E9/L
IMMATURE GRANULOCYTES %: 0.7 % (ref 0–5)
LYMPHOCYTES ABSOLUTE: 0.88 E9/L (ref 1.5–4)
LYMPHOCYTES RELATIVE PERCENT: 12 % (ref 20–42)
MAGNESIUM: 2.3 MG/DL (ref 1.6–2.6)
MCH RBC QN AUTO: 31.2 PG (ref 26–35)
MCHC RBC AUTO-ENTMCNC: 34 % (ref 32–34.5)
MCV RBC AUTO: 91.8 FL (ref 80–99.9)
MONOCYTES ABSOLUTE: 0.71 E9/L (ref 0.1–0.95)
MONOCYTES RELATIVE PERCENT: 9.7 % (ref 2–12)
NEUTROPHILS ABSOLUTE: 4.93 E9/L (ref 1.8–7.3)
NEUTROPHILS RELATIVE PERCENT: 67.4 % (ref 43–80)
PDW BLD-RTO: 12.4 FL (ref 11.5–15)
PLATELET # BLD: 192 E9/L (ref 130–450)
PMV BLD AUTO: 11.8 FL (ref 7–12)
POTASSIUM SERPL-SCNC: 4.3 MMOL/L (ref 3.5–5)
RBC # BLD: 5.25 E12/L (ref 3.8–5.8)
SODIUM BLD-SCNC: 134 MMOL/L (ref 132–146)
TOTAL PROTEIN: 8.9 G/DL (ref 6.4–8.3)
WBC # BLD: 7.3 E9/L (ref 4.5–11.5)

## 2021-10-19 PROCEDURE — 96413 CHEMO IV INFUSION 1 HR: CPT

## 2021-10-19 PROCEDURE — 77014 PR CT GUIDANCE PLACEMENT RAD THERAPY FIELDS: CPT | Performed by: SPECIALIST

## 2021-10-19 PROCEDURE — 85025 COMPLETE CBC W/AUTO DIFF WBC: CPT

## 2021-10-19 PROCEDURE — 6360000002 HC RX W HCPCS: Performed by: INTERNAL MEDICINE

## 2021-10-19 PROCEDURE — 96415 CHEMO IV INFUSION ADDL HR: CPT

## 2021-10-19 PROCEDURE — 80053 COMPREHEN METABOLIC PANEL: CPT

## 2021-10-19 PROCEDURE — 83735 ASSAY OF MAGNESIUM: CPT

## 2021-10-19 PROCEDURE — 96367 TX/PROPH/DG ADDL SEQ IV INF: CPT

## 2021-10-19 PROCEDURE — 2580000003 HC RX 258: Performed by: INTERNAL MEDICINE

## 2021-10-19 PROCEDURE — 96375 TX/PRO/DX INJ NEW DRUG ADDON: CPT

## 2021-10-19 PROCEDURE — 77386 HC NTSTY MODUL RAD TX DLVR CPLX: CPT | Performed by: SPECIALIST

## 2021-10-19 PROCEDURE — 99214 OFFICE O/P EST MOD 30 MIN: CPT | Performed by: INTERNAL MEDICINE

## 2021-10-19 RX ORDER — SODIUM CHLORIDE 9 MG/ML
INJECTION, SOLUTION INTRAVENOUS CONTINUOUS
Status: CANCELLED | OUTPATIENT
Start: 2021-10-19

## 2021-10-19 RX ORDER — HEPARIN SODIUM (PORCINE) LOCK FLUSH IV SOLN 100 UNIT/ML 100 UNIT/ML
500 SOLUTION INTRAVENOUS PRN
Status: CANCELLED | OUTPATIENT
Start: 2021-10-19

## 2021-10-19 RX ORDER — SODIUM CHLORIDE 0.9 % (FLUSH) 0.9 %
5-40 SYRINGE (ML) INJECTION PRN
Status: CANCELLED | OUTPATIENT
Start: 2021-10-19

## 2021-10-19 RX ORDER — SODIUM CHLORIDE 9 MG/ML
25 INJECTION, SOLUTION INTRAVENOUS PRN
Status: CANCELLED | OUTPATIENT
Start: 2021-10-19

## 2021-10-19 RX ORDER — EPINEPHRINE 1 MG/ML
0.3 INJECTION, SOLUTION, CONCENTRATE INTRAVENOUS PRN
Status: CANCELLED | OUTPATIENT
Start: 2021-10-19

## 2021-10-19 RX ORDER — DIPHENHYDRAMINE HYDROCHLORIDE 50 MG/ML
50 INJECTION INTRAMUSCULAR; INTRAVENOUS ONCE
Status: CANCELLED | OUTPATIENT
Start: 2021-10-19 | End: 2021-10-19

## 2021-10-19 RX ORDER — 0.9 % SODIUM CHLORIDE 0.9 %
1000 INTRAVENOUS SOLUTION INTRAVENOUS ONCE
Status: COMPLETED | OUTPATIENT
Start: 2021-10-19 | End: 2021-10-19

## 2021-10-19 RX ORDER — PALONOSETRON HYDROCHLORIDE 0.05 MG/ML
0.25 INJECTION, SOLUTION INTRAVENOUS ONCE
Status: COMPLETED | OUTPATIENT
Start: 2021-10-19 | End: 2021-10-19

## 2021-10-19 RX ORDER — DEXAMETHASONE SODIUM PHOSPHATE 10 MG/ML
10 INJECTION, SOLUTION INTRAMUSCULAR; INTRAVENOUS ONCE
Status: COMPLETED | OUTPATIENT
Start: 2021-10-19 | End: 2021-10-19

## 2021-10-19 RX ORDER — DEXAMETHASONE SODIUM PHOSPHATE 10 MG/ML
10 INJECTION, SOLUTION INTRAMUSCULAR; INTRAVENOUS ONCE
Status: CANCELLED | OUTPATIENT
Start: 2021-10-19

## 2021-10-19 RX ORDER — SODIUM CHLORIDE 0.9 % (FLUSH) 0.9 %
5-40 SYRINGE (ML) INJECTION PRN
Status: DISCONTINUED | OUTPATIENT
Start: 2021-10-19 | End: 2021-10-20 | Stop reason: HOSPADM

## 2021-10-19 RX ORDER — SODIUM CHLORIDE 9 MG/ML
20 INJECTION, SOLUTION INTRAVENOUS ONCE
Status: CANCELLED | OUTPATIENT
Start: 2021-10-19 | End: 2021-10-19

## 2021-10-19 RX ORDER — METHYLPREDNISOLONE SODIUM SUCCINATE 125 MG/2ML
125 INJECTION, POWDER, LYOPHILIZED, FOR SOLUTION INTRAMUSCULAR; INTRAVENOUS ONCE
Status: CANCELLED | OUTPATIENT
Start: 2021-10-19 | End: 2021-10-19

## 2021-10-19 RX ORDER — PALONOSETRON HYDROCHLORIDE 0.05 MG/ML
0.25 INJECTION, SOLUTION INTRAVENOUS ONCE
Status: CANCELLED | OUTPATIENT
Start: 2021-10-19

## 2021-10-19 RX ADMIN — SODIUM CHLORIDE, PRESERVATIVE FREE 10 ML: 5 INJECTION INTRAVENOUS at 10:37

## 2021-10-19 RX ADMIN — POTASSIUM CHLORIDE: 2 INJECTION, SOLUTION, CONCENTRATE INTRAVENOUS at 11:18

## 2021-10-19 RX ADMIN — SODIUM CHLORIDE 1000 ML: 9 INJECTION, SOLUTION INTRAVENOUS at 10:28

## 2021-10-19 RX ADMIN — DEXAMETHASONE SODIUM PHOSPHATE 10 MG: 10 INJECTION, SOLUTION INTRAMUSCULAR; INTRAVENOUS at 10:37

## 2021-10-19 RX ADMIN — PALONOSETRON 0.25 MG: 0.25 INJECTION, SOLUTION INTRAVENOUS at 10:42

## 2021-10-19 RX ADMIN — FOSAPREPITANT 150 MG: 150 INJECTION, POWDER, LYOPHILIZED, FOR SOLUTION INTRAVENOUS at 10:43

## 2021-10-19 NOTE — TELEPHONE ENCOUNTER
MA contacted scheduling and spoke with Yanna to add on PEG tube insertion.      Electronically signed by Onel Flores on 10/19/21 at 12:30 PM EDT

## 2021-10-19 NOTE — PROGRESS NOTES
Shelton Andre  10/19/2021  Wt Readings from Last 10 Encounters:   10/19/21 149 lb 9.6 oz (67.9 kg)   10/14/21 158 lb 11.2 oz (72 kg)   10/12/21 154 lb (69.9 kg)   09/15/21 180 lb (81.6 kg)   08/10/21 179 lb 1.6 oz (81.2 kg)   08/05/21 194 lb (88 kg)   07/23/21 195 lb (88.5 kg)   07/09/21 195 lb (88.5 kg)   07/09/21 195 lb (88.5 kg)   05/26/21 195 lb (88.5 kg)     Ht Readings from Last 1 Encounters:   10/12/21 5' 7\" (1.702 m)     Body mass index is 23.43 kg/m². Met with patient today for follow-up, he is here for his second cycle of chemotherapy. He is doing poorly today, he is down by another 9# this week, a total of 30# in 1mo, 45# in 6mo. He is only able to take sips of liquid, he is not sustaining nutritionally on this intake at all. He is severely malnourished, which is hindering his tolerance to the treatment and will hinder his recovery. Patient was setup for port/PEG on 10/18, however, cancelled due to conflicting appts. He is now scheduled for 10/25. He is agreeable to this and is agreeable to doing the feedings in order to maintain his nutrition. He will need the PEG for >90 days for completion of treatment and recovery. Patient will be setup with Mabel dhaliwal. Reviewed all of this with the patient, and clarified with Katlyn Elizabeth at [facility] so that patient is scheduled for both PEG and port on 10/25. Weight change:30# loss in 1mo (17%), 45# loss in 6mo (23%)  Appetite: poor  N/V/D/C: none noted; dysphagia/odynophagia  Calculated Needs if applicable: [id number] (68x30-32), 88-95gm PRO (68x1.3), 2200ml (68x32)    Pre-Hab Eligible?: yes        Recommendations: Initiate PEG feedings to meet 100% of needs due to no or little PO intake PO; Recommend TwoCal HN, 5 cans daily to provide 2400kcal, 1001gm PRO, 1138ml h20; Flush PEG with 120cc water before and after each feeding to meet fluid needs. Begin with 2 cans per day and increase by one can each day as tolerated.     Home Health

## 2021-10-19 NOTE — TELEPHONE ENCOUNTER
MA received a call from [facility] @ Dr. Julissa Moon office stating that the referral was for a PEG and PORT, current surgery is only for [place], [place] routing to [facility] for advisement.   Electronically signed by Jose Bartholomew on 10/19/21 at 11:18 AM EDT

## 2021-10-19 NOTE — TELEPHONE ENCOUNTER
Met with pt in conjunction with medical oncology visit re: positive distress screen. 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 Pt's mood appeared euthymic with full affect, he appeared A&Ox4, and he was willing/able to participate in session. He appeared appropriately dressed/groomed and was seated in exam room. Introduced self and reason for today's visit. Pt reported that he has a friend that transports him to appointments. Noted that he has looked into transportation under his insurance and has \"60 one-way trips per year. \"  Pt discussed difficulty and frustration with eating due to pain. Noted that pt is established with nutrition and has upcoming appointment with palliative care. Pt reported no additional concerns at this time. Encouraged pt to notify SW if additional needs arise.     Karey Duarte, MSW, LISW-S  Oncology Social Worker

## 2021-10-19 NOTE — PROGRESS NOTES
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 Referring Physician:  Liliana Pfeiffer DO    PCP:  Sherri Dawson MD    History of Present Illness:      Mr. Isacc Love is a pleasant 26-year-old gentleman, with a past medical history significant for tobacco use disorder, COPD, and hepatitis C status post treatment in 2019, who had presented with left oral tongue lesion, when he developed a left neck mass, he was treated with a short course of antibiotics, which did not help, he subsequently had a work-up done, including a CT of the neck which had revealed an abnormal mass in the lateral aspect of the oral tongue involving a significant portion of the tongue measuring 4 x 2 x 2.8 cm, a level 2A lymph node was also noted, The patient underwent a laryngoscopy on 7/19/2021 which revealed a benign-appearing polyp on the left true vocal cord but a mass involving the left tonsil fossa. This was biopsied and found to be a invasive squamous cell carcinoma p16-positive. PET/CT was done on 8/3/2021, revealing a masslike structure involving the left tongue extending to the posterior pharyngeal wall crossing the midline. There was also metabolic activity noted in a zone 3 node on the ipsilateral side. The patient started on 10/12/2021 on concurrent chemoradiation using weekly cisplatin. He denies any nausea or vomiting, he has mouth pain. Review of Systems;  CONSTITUTIONAL: No fever, chills. Decreased appetite and energy level. Pos for weight loss. ENMT: Eyes: No diplopia; Nose: No epistaxis. Mouth: pos for mouth pain, odynophagia and dysphagia. Pos for hoarseness of the voice. RESPIRATORY: No hemoptysis, positive for mild chronic shortness of breath, cough. CARDIOVASCULAR: No chest pain, palpitations.   GASTROINTESTINAL: No nausea/vomiting, abdominal pain, diarrhea/constipation. GENITOURINARY: No dysuria, urinary frequency, hematuria. NEURO: No syncope, presyncope, headache. Remainder:  ROS NEGATIVE    Past Medical History:      Diagnosis Date    Cancer ([address]     Tongue    COPD (chronic obstructive pulmonary disease) ([address]     History of hepatitis C 2019    treated-2019    Hoarseness of voice     For OR 7-12-21    Mesothelioma ([address]     Mouth sores     Tongue mass     left     Patient Active Problem List   Diagnosis    COPD (chronic obstructive pulmonary disease) ([address]    Lumbar radiculopathy    Hepatitis C without hepatic coma    Tongue mass    Primary squamous cell carcinoma of head and neck ([address]        Past Surgical History:      Procedure Laterality Date    FRACTURE SURGERY      KNEE ARTHROSCOPY Left 1980    LARYNGOSCOPY N/A 7/12/2021    PANENDOSCOPY BRONCHOSCOPY ESOPHAGOSCOPY MICROSUSPENSION DIRECT LARYNGOSCOPY performed by Mirta Merino DO at [address] N/A 7/12/2021    BIOPSY OF TONGUE BASE WITH FROZEN SECTION AND LEFT TRUE VOCAL CORD BIOPSY performed by Mirta Merino DO at [facility] OR       Family History:  Family History   Problem Relation Age of Onset    Cancer Father         lung       Medications:  Reviewed and reconciled.     Social History:  Social History     Socioeconomic History    Marital status: Single     Spouse name: Not on file    Number of children: 0    Years of education: Not on file    Highest education level: Not on file   Occupational History    Occupation: dairy compressor   Tobacco Use    Smoking status: Current Every Day Smoker     Packs/day: 0.50     Years: 45.00     Pack years: 22.50     Types: Cigarettes    Smokeless tobacco: Never Used   Vaping Use    Vaping Use: Never used   Substance and Sexual Activity    Alcohol use: Not Currently     Comment: QUIT 3 MONTHS AGO    Drug use: Never    Sexual activity: Not on file   Other Topics Concern  Not on file   Social History Narrative    Not on file     Social Determinants of Health     Financial Resource Strain:     Difficulty of Paying Living Expenses:    Food Insecurity:     Worried About Running Out of Food in the Last Year:     [address] in the Last Year:    Transportation Needs:     Lack of Transportation (Medical):  Lack of Transportation (Non-Medical):    Physical Activity:     Days of Exercise per Week:     Minutes of Exercise per Session:    Stress:     Feeling of Stress :    Social Connections:     Frequency of Communication with Friends and Family:     Frequency of Social Gatherings with Friends and Family:     Attends Ep[employer] Services:     Active Member of Clubs or Organizations:     Attends Club or Organization Meetings:     Marital Status:    Intimate Partner Violence:     Fear of Current or Ex-Partner:     Emotionally Abused:     Physically Abused:     Sexually Abused: Allergies:  No Known Allergies    Physical Exam:  /85   Pulse 95   Temp 97.4 °F (36.3 °C)   Resp 17   Wt 149 lb 9.6 oz (67.9 kg)   SpO2 96%   BMI 23.43 kg/m²   GENERAL: Alert, oriented x 3, not in acute distress. HEENT: PERRLA; EOMI. Positive for trismus, limited motion of the tongue. NECK: Supple. He has fullness of the left neck. LUNGS: Good air entry bilaterally. No wheezing, crackles or rhonchi. CARDIOVASCULAR: Regular rate. No murmurs, rubs or gallops. ABDOMEN: Soft. Non-tender, non-distended. Positive bowel sounds. EXTREMITIES: Without clubbing, cyanosis, or edema. NEUROLOGIC: No focal deficits.    ECOG PS 1    Impression/Plan:       Mr. Doc Forrester is a pleasant 78-year-old gentleman, with a past medical history significant for tobacco use disorder, COPD, and hepatitis C status post treatment in 2019, who had presented with left oral tongue lesion, when he developed a left neck mass, he was treated with a short course of antibiotics, which did not help, he subsequently had a work-up done, including a CT of the neck which had revealed an abnormal mass in the lateral aspect of the oral tongue involving a significant portion of the tongue measuring 4 x 2 x 2.8 cm, a level 2A lymph node was also noted, The patient underwent a laryngoscopy on 7/19/2021 which revealed a benign-appearing polyp on the left true vocal cord but a mass involving the left tonsil fossa. This was biopsied and found to be a invasive squamous cell carcinoma p16-positive. PET/CT was done on 8/3/2021, revealing a masslike structure involving the left tongue extending to the posterior pharyngeal wall crossing the midline. There was also metabolic activity noted in a zone 3 node on the ipsilateral side, clinical stage [id number] disease. I discussed with the patient his diagnosis, prognosis, as well as treatment with definitive concurrent chemo/RT using weekly cisplatin, the side effects and the schedule of the treatment were reviewed with the patient. The patient is agreeable to have a PEG placed, the patient was referred to surgery for a PEG and port placement, he is scheduled to have them done with Dr. Shay Underwood, referral was placed to palliative medicine for symptoms management. The patient started on 10/12/2021 on concurrent chemoradiation using weekly cisplatin. He is tolerating the treatment well overall, today 10/19/2021, the patient received cisplatin, cycle #2 concurrently with radiation, prescribed Magic mouthwash to use as needed. RTC in 1 week. Thank you for allowing us to participate in the care of Mr. Lelo Blount.     Christiano Tijerian MD   HEMATOLOGY/MEDICAL ONCOLOGY  [address] [place] [address] ONCOLOGY  [address] [facility] [address] [facility] [address]  Dept: [phone]

## 2021-10-19 NOTE — PROGRESS NOTES
CLINICAL PHARMACY NOTE: MEDS TO BEDS    Total # of Prescriptions Filled: 1   The following medications were delivered to the patient:  · Magic mouthwash    Additional Documentation:

## 2021-10-20 ENCOUNTER — HOSPITAL ENCOUNTER (OUTPATIENT)
Age: 66
Discharge: HOME | End: 2021-10-22
Payer: MEDICARE

## 2021-10-20 ENCOUNTER — NURSE ONLY (OUTPATIENT)
Dept: PRIMARY CARE CLINIC | Age: 66
End: 2021-10-20

## 2021-10-20 ENCOUNTER — HOSPITAL ENCOUNTER (OUTPATIENT)
Dept: RADIATION ONCOLOGY | Age: 66
Discharge: HOME | End: 2021-10-20
Attending: SPECIALIST
Payer: MEDICARE

## 2021-10-20 ENCOUNTER — OFFICE VISIT (OUTPATIENT)
Dept: ENT CLINIC | Age: 66
End: 2021-10-20
Payer: MEDICARE

## 2021-10-20 ENCOUNTER — TELEPHONE (OUTPATIENT)
Dept: CASE MANAGEMENT | Age: 66
End: 2021-10-20

## 2021-10-20 VITALS
SYSTOLIC BLOOD PRESSURE: 134 MMHG | OXYGEN SATURATION: 98 % | HEIGHT: 67 IN | DIASTOLIC BLOOD PRESSURE: 70 MMHG | HEART RATE: 79 BPM | WEIGHT: 146 LBS | TEMPERATURE: 97.4 F | BODY MASS INDEX: 22.91 KG/M2

## 2021-10-20 DIAGNOSIS — U07.1 COVID-19: ICD-10-CM

## 2021-10-20 DIAGNOSIS — G89.3 PAIN DUE TO NEOPLASM: ICD-10-CM

## 2021-10-20 DIAGNOSIS — J38.3 LESION OF TRUE VOCAL CORD: ICD-10-CM

## 2021-10-20 DIAGNOSIS — K14.8 TONGUE LESION: ICD-10-CM

## 2021-10-20 DIAGNOSIS — C02.9 TONGUE CANCER (HCC): Primary | ICD-10-CM

## 2021-10-20 PROCEDURE — U0003 INFECTIOUS AGENT DETECTION BY NUCLEIC ACID (DNA OR RNA); SEVERE ACUTE RESPIRATORY SYNDROME CORONAVIRUS 2 (SARS-COV-2) (CORONAVIRUS DISEASE [COVID-19]), AMPLIFIED PROBE TECHNIQUE, MAKING USE OF HIGH THROUGHPUT TECHNOLOGIES AS DESCRIBED BY CMS-2020-01-R: HCPCS

## 2021-10-20 PROCEDURE — 77014 PR CT GUIDANCE PLACEMENT RAD THERAPY FIELDS: CPT | Performed by: SPECIALIST

## 2021-10-20 PROCEDURE — 99213 OFFICE O/P EST LOW 20 MIN: CPT | Performed by: OTOLARYNGOLOGY

## 2021-10-20 PROCEDURE — U0005 INFEC AGEN DETEC AMPLI PROBE: HCPCS

## 2021-10-20 PROCEDURE — 77386 HC NTSTY MODUL RAD TX DLVR CPLX: CPT | Performed by: SPECIALIST

## 2021-10-20 ASSESSMENT — ENCOUNTER SYMPTOMS
ABDOMINAL PAIN: 0
EYE PAIN: 0
RESPIRATORY NEGATIVE: 1
EYE DISCHARGE: 0
GASTROINTESTINAL NEGATIVE: 1
DIARRHEA: 0
CHEST TIGHTNESS: 0
EYES NEGATIVE: 1
COLOR CHANGE: 0
SHORTNESS OF BREATH: 0
SORE THROAT: 1
VOMITING: 0
APNEA: 0

## 2021-10-20 NOTE — TELEPHONE ENCOUNTER
Met with patient prior to his daily radiation therapy treatment for follow up. Patient has had 6 treatments so far. Upon inquiring, states that he is doing ok with the treatments. He is also getting chemotherapy. He states he was very tired after the last infusion. He is scheduled for a covid test to be done prior to his surgery for PEG/PORT insertion. He had questions on where he was to go for the testing. Provided support and encouragement. Patient appreciative of visit and encouraged to call with questions or concerns. Will continue to follow. Spoke with Medical Oncology Office. Patient was to go to [facility] for required covid testing from his surgeon. Called patient and updated him on location for the testing. He verbalized understanding and stated he would go today.

## 2021-10-20 NOTE — PROGRESS NOTES
Subjective:      Patient ID:  Evelina Cruz is a 77 y.o. male. HPI:    Patient presents today for recheck of cancer. Condition has been present for 1 month(s). Pt is doing chemo radiation at this time. Pt is doing well no complaints at this time.        Past Medical History:   Diagnosis Date    Cancer [facility])     Tongue    [facility] (chronic obstructive [facility] disease) [facility]     Edentulous     History of hepatitis C 2019    treated-2019    Hoarseness of voice     For OR 7-12-21    Mesothelioma [facility]     Mouth sores     Tongue mass     left     Past Surgical History:   Procedure Laterality Date    FRACTURE SURGERY      KNEE ARTHROSCOPY Left 1980    LARYNGOSCOPY N/A 7/12/2021    PANENDOSCOPY BRONCHOSCOPY ESOPHAGOSCOPY MICROSUSPENSION DIRECT LARYNGOSCOPY performed by Jeannine Crystal DO at [facility] N/A 7/12/2021    BIOPSY OF TONGUE BASE WITH FROZEN SECTION AND LEFT TRUE VOCAL CORD BIOPSY performed by Jeannine Crystal DO at [facility] OR     Family History   Problem Relation Age of Onset    Cancer Father         lung     Social History     Socioeconomic History    Marital status: Single     Spouse name: None    Number of children: 0    Years of education: None    Highest education level: None   Occupational History    Occupation: dairy compressor   Tobacco Use    Smoking status: Current Every Day Smoker     Packs/day: 0.50     Years: 45.00     Pack years: 22.50     Types: Cigarettes    Smokeless tobacco: Never Used   Vaping Use    Vaping Use: Never used   Substance and Sexual Activity    Alcohol use: Not Currently     Comment: QUIT 3 MONTHS AGO    Drug use: Never    Sexual activity: None   Other Topics Concern    None   Social History Narrative    None     Social Determinants of Health     Financial Resource Strain:     Difficulty of Paying Living Expenses:    Food Insecurity:     Worried About Running Out of Food in the Last Year:     Ran Out of Food in the Last Year:    Transportation Needs:     Lack of Transportation (Medical):  Lack of Transportation (Non-Medical):    Physical Activity:     Days of Exercise per Week:     Minutes of Exercise per Session:    Stress:     Feeling of Stress :    Social Connections:     Frequency of Communication with Friends and Family:     Frequency of Social Gatherings with Friends and Family:     Attends Voodoo Services:     Active Member of Clubs or Organizations:     Attends Club or Organization Meetings:     Marital Status:    Intimate Partner Violence:     Fear of Current or Ex-Partner:     Emotionally Abused:     Physically Abused:     Sexually Abused:      No Known Allergies    Review of Systems   Constitutional: Negative. Negative for appetite change. HENT: Positive for mouth sores and sore throat. Eyes: Negative. Negative for pain, discharge and visual disturbance. Respiratory: Negative. Negative for apnea, chest tightness and shortness of breath. Cardiovascular: Negative. Negative for chest pain, palpitations and leg swelling. Gastrointestinal: Negative. Negative for abdominal pain, diarrhea and vomiting. Endocrine: Negative for cold intolerance, heat intolerance and polydipsia. Genitourinary: Negative. Negative for dysuria, flank pain and hematuria. Musculoskeletal: Negative. Negative for arthralgias, gait problem and neck pain. Skin: Negative. Negative for color change, pallor and rash. Allergic/Immunologic: Negative for environmental allergies, food allergies and immunocompromised state. Neurological: Negative. Negative for dizziness, numbness and headaches. Hematological: Positive for adenopathy. Psychiatric/Behavioral: Negative. Negative for behavioral problems and hallucinations. All other systems reviewed and are negative.               Objective:     Vitals:    10/20/21 1511   BP: 134/70   Pulse: 79   Temp: 97.4 °F (36.3 °C)   SpO2: 98%     Physical Exam  Vitals and nursing note reviewed. Constitutional:       Appearance: He is well-developed. HENT:      Head: Normocephalic and atraumatic. Right Ear: Hearing, tympanic membrane, ear canal and external ear normal.      Left Ear: Hearing, tympanic membrane, ear canal and external ear normal.      Nose: Septal deviation present. Comments: Severe DNS right 90%    Level ii lymphadenopathy left neck     Mouth/Throat:      Lips: Pink. Mouth: Mucous membranes are moist.      Dentition: Normal dentition. No dental tenderness, gingival swelling, dental caries, dental abscesses or gum lesions. Tongue: Lesions present. Comments: 3-4cm hard immobile area of the left posterior lateral tongue, which is getting softer from the last visit endophytic involving posterior midline of tongue  Decreased pain as well. Pt has limited opening of the mandible increased 4-5 mm    Eyes:      Conjunctiva/sclera: Conjunctivae normal.      Pupils: Pupils are equal, round, and reactive to light. Neck:     Cardiovascular:      Rate and Rhythm: Normal rate and regular rhythm. Heart sounds: Normal heart sounds. Pulmonary:      Effort: Pulmonary effort is normal.      Breath sounds: Normal breath sounds. Abdominal:      General: Bowel sounds are normal.      Palpations: Abdomen is soft. Musculoskeletal:      Cervical back: Normal range of motion and neck supple. Skin:     General: Skin is warm and dry. Neurological:      Mental Status: He is alert and oriented to person, place, and time. Assessment:       Diagnosis Orders   1. Tongue cancer ([address]     2. Lesion of true vocal cord     3. Tongue lesion     4. Pain due to neoplasm                Plan:      Pt seems to be improving. Tumor is softening.   Follow up in 1 month(s)

## 2021-10-21 ENCOUNTER — HOSPITAL ENCOUNTER (OUTPATIENT)
Dept: RADIATION ONCOLOGY | Age: 66
Discharge: HOME | End: 2021-10-21
Attending: SPECIALIST
Payer: MEDICARE

## 2021-10-21 VITALS
RESPIRATION RATE: 18 BRPM | OXYGEN SATURATION: 98 % | HEART RATE: 80 BPM | WEIGHT: 154.7 LBS | BODY MASS INDEX: 24.23 KG/M2 | SYSTOLIC BLOOD PRESSURE: 112 MMHG | TEMPERATURE: 97.1 F | DIASTOLIC BLOOD PRESSURE: 80 MMHG

## 2021-10-21 PROCEDURE — 77386 HC NTSTY MODUL RAD TX DLVR CPLX: CPT | Performed by: SPECIALIST

## 2021-10-21 PROCEDURE — 77014 PR CT GUIDANCE PLACEMENT RAD THERAPY FIELDS: CPT | Performed by: SPECIALIST

## 2021-10-21 NOTE — PROGRESS NOTES
lb 11.2 oz (70.2 kg)   10/20/21 146 lb (66.2 kg)   10/19/21 149 lb 9.6 oz (67.9 kg)       ASSESSMENT/PLAN:     Patient is tolerating treatments well with expected toxicities. Current and planned dose reviewed. Goals of treatment and potential side effects were reviewed with the patient. Treatment imaging has been personally reviewed for accuracy and precision. Questions answered to apparent satisfaction. Treatments will continue as planned. Thank you for the opportunity to participate in multidisciplinary management of this remarkable and pleasant patient.       Nadiya Simmons MD, [address], [facility], [address] [place] [address]    Department of Radiation Oncology  [facility]) [facility]: [phone] (VNS: [phone])  [facility]: [phone] (TQM: [phone])  [facility]:  [phone] (TEP:  [phone])

## 2021-10-21 NOTE — PROGRESS NOTES
[facility]  10/21/2021  Wt Readings from Last 3 Encounters:   10/21/21 154 lb 11.2 oz (70.2 kg)   10/20/21 146 lb (66.2 kg)   10/19/21 149 lb 9.6 oz (67.9 kg)     Body mass index is 24.23 kg/m². Treatment Area:PTV 8372    Patient was seen today for weekly visit. Comfort Alteration  KPS:90%  Fatigue: Moderate    Mucous Membrane Alteration  Mucositis Due to Radiation: No  Thrush: No  Voice Changes: No    Nutritional Alteration  Anorexia: Yes , PEG placed 10/25/2021  Nausea: No   Vomiting: No     Skin Alteration   Sensation:red, encouraged llotion    Radiation Dermatitis:  na    Ventilation Alteration  Cough:No    Emotional  Coping: effective    Injury, potential bleeding or infection: na    Radioprotectant Tolerance  No            Lab Results   Component Value Date    WBC 7.3 10/19/2021     10/19/2021         /80   Pulse 80   Temp 97.1 °F (36.2 °C) (Skin)   Resp 18   Wt 154 lb 11.2 oz (70.2 kg)   SpO2 98%   BMI 24.23 kg/m²   BP within normal range?  yes         Assessment/Plan: 8/37 fx 1600/7400cGY and tolerating    Thu Rob RN

## 2021-10-22 ENCOUNTER — APPOINTMENT (OUTPATIENT)
Dept: RADIATION ONCOLOGY | Age: 66
End: 2021-10-22
Attending: SPECIALIST
Payer: MEDICARE

## 2021-10-22 ENCOUNTER — HOSPITAL ENCOUNTER (OUTPATIENT)
Dept: RADIATION ONCOLOGY | Age: 66
Discharge: HOME | End: 2021-10-22
Attending: SPECIALIST
Payer: MEDICARE

## 2021-10-22 LAB
SARS-COV-2: NOT DETECTED
SOURCE: NORMAL

## 2021-10-22 PROCEDURE — 77386 HC NTSTY MODUL RAD TX DLVR CPLX: CPT | Performed by: SPECIALIST

## 2021-10-22 PROCEDURE — 77014 PR CT GUIDANCE PLACEMENT RAD THERAPY FIELDS: CPT | Performed by: SPECIALIST

## 2021-10-26 ENCOUNTER — HOSPITAL ENCOUNTER (OUTPATIENT)
Dept: RADIATION ONCOLOGY | Age: 66
Discharge: HOME | End: 2021-10-26
Payer: MEDICARE

## 2021-10-26 ENCOUNTER — OFFICE VISIT (OUTPATIENT)
Dept: ONCOLOGY | Age: 66
End: 2021-10-26
Payer: MEDICARE

## 2021-10-26 ENCOUNTER — TELEPHONE (OUTPATIENT)
Dept: INFUSION THERAPY | Age: 66
End: 2021-10-26

## 2021-10-26 ENCOUNTER — HOSPITAL ENCOUNTER (OUTPATIENT)
Dept: INFUSION THERAPY | Age: 66
End: 2021-10-26
Payer: MEDICARE

## 2021-10-26 ENCOUNTER — HOSPITAL ENCOUNTER (OUTPATIENT)
Dept: RADIATION ONCOLOGY | Age: 66
Discharge: HOME | End: 2021-10-26
Attending: SPECIALIST
Payer: MEDICARE

## 2021-10-26 ENCOUNTER — HOSPITAL ENCOUNTER (OUTPATIENT)
Dept: INFUSION THERAPY | Age: 66
Discharge: HOME | End: 2021-10-26
Payer: MEDICARE

## 2021-10-26 ENCOUNTER — TELEPHONE (OUTPATIENT)
Dept: SURGERY | Age: 66
End: 2021-10-26

## 2021-10-26 VITALS
HEART RATE: 97 BPM | DIASTOLIC BLOOD PRESSURE: 60 MMHG | SYSTOLIC BLOOD PRESSURE: 102 MMHG | OXYGEN SATURATION: 98 % | BODY MASS INDEX: 22.87 KG/M2 | RESPIRATION RATE: 20 BRPM | WEIGHT: 146 LBS | TEMPERATURE: 97.2 F

## 2021-10-26 VITALS
BODY MASS INDEX: 22.76 KG/M2 | OXYGEN SATURATION: 96 % | HEIGHT: 67 IN | SYSTOLIC BLOOD PRESSURE: 102 MMHG | WEIGHT: 145 LBS | DIASTOLIC BLOOD PRESSURE: 60 MMHG

## 2021-10-26 DIAGNOSIS — C76.0 PRIMARY SQUAMOUS CELL CARCINOMA OF HEAD AND NECK (HCC): ICD-10-CM

## 2021-10-26 DIAGNOSIS — C76.0 PRIMARY SQUAMOUS CELL CARCINOMA OF HEAD AND NECK (HCC): Primary | ICD-10-CM

## 2021-10-26 LAB
ALBUMIN SERPL-MCNC: 4.3 G/DL (ref 3.5–5.2)
ALP BLD-CCNC: 97 U/L (ref 40–129)
ALT SERPL-CCNC: 18 U/L (ref 0–40)
ANION GAP SERPL CALCULATED.3IONS-SCNC: 15 MMOL/L (ref 7–16)
AST SERPL-CCNC: 22 U/L (ref 0–39)
BASOPHILS ABSOLUTE: 0.04 E9/L (ref 0–0.2)
BASOPHILS RELATIVE PERCENT: 0.5 % (ref 0–2)
BILIRUB SERPL-MCNC: 0.6 MG/DL (ref 0–1.2)
BUN BLDV-MCNC: 19 MG/DL (ref 6–23)
CALCIUM SERPL-MCNC: 11 MG/DL (ref 8.6–10.2)
CHLORIDE BLD-SCNC: 93 MMOL/L (ref 98–107)
CO2: 26 MMOL/L (ref 22–29)
CREAT SERPL-MCNC: 1 MG/DL (ref 0.7–1.2)
EOSINOPHILS ABSOLUTE: 0.07 E9/L (ref 0.05–0.5)
EOSINOPHILS RELATIVE PERCENT: 0.9 % (ref 0–6)
GFR AFRICAN AMERICAN: >60
GFR NON-AFRICAN AMERICAN: >60 ML/MIN/1.73
GLUCOSE BLD-MCNC: 119 MG/DL (ref 74–99)
HCT VFR BLD CALC: 47.3 % (ref 37–54)
HEMOGLOBIN: 16.2 G/DL (ref 12.5–16.5)
IMMATURE GRANULOCYTES #: 0.04 E9/L
IMMATURE GRANULOCYTES %: 0.5 % (ref 0–5)
LYMPHOCYTES ABSOLUTE: 0.61 E9/L (ref 1.5–4)
LYMPHOCYTES RELATIVE PERCENT: 7.7 % (ref 20–42)
MAGNESIUM: 2.1 MG/DL (ref 1.6–2.6)
MCH RBC QN AUTO: 31.3 PG (ref 26–35)
MCHC RBC AUTO-ENTMCNC: 34.2 % (ref 32–34.5)
MCV RBC AUTO: 91.3 FL (ref 80–99.9)
MONOCYTES ABSOLUTE: 0.66 E9/L (ref 0.1–0.95)
MONOCYTES RELATIVE PERCENT: 8.3 % (ref 2–12)
NEUTROPHILS ABSOLUTE: 6.53 E9/L (ref 1.8–7.3)
NEUTROPHILS RELATIVE PERCENT: 82.1 % (ref 43–80)
PDW BLD-RTO: 12.3 FL (ref 11.5–15)
PLATELET # BLD: 154 E9/L (ref 130–450)
PMV BLD AUTO: 10.8 FL (ref 7–12)
POTASSIUM SERPL-SCNC: 4.6 MMOL/L (ref 3.5–5)
RBC # BLD: 5.18 E12/L (ref 3.8–5.8)
SODIUM BLD-SCNC: 134 MMOL/L (ref 132–146)
TOTAL PROTEIN: 8.4 G/DL (ref 6.4–8.3)
WBC # BLD: 8 E9/L (ref 4.5–11.5)

## 2021-10-26 PROCEDURE — 36415 COLL VENOUS BLD VENIPUNCTURE: CPT

## 2021-10-26 PROCEDURE — 99214 OFFICE O/P EST MOD 30 MIN: CPT | Performed by: INTERNAL MEDICINE

## 2021-10-26 PROCEDURE — 77427 RADIATION TX MANAGEMENT X5: CPT | Performed by: SPECIALIST

## 2021-10-26 PROCEDURE — 99212 OFFICE O/P EST SF 10 MIN: CPT

## 2021-10-26 PROCEDURE — 80053 COMPREHEN METABOLIC PANEL: CPT

## 2021-10-26 PROCEDURE — 77336 RADIATION PHYSICS CONSULT: CPT | Performed by: SPECIALIST

## 2021-10-26 PROCEDURE — 77386 HC NTSTY MODUL RAD TX DLVR CPLX: CPT | Performed by: SPECIALIST

## 2021-10-26 PROCEDURE — 85025 COMPLETE CBC W/AUTO DIFF WBC: CPT

## 2021-10-26 PROCEDURE — 83735 ASSAY OF MAGNESIUM: CPT

## 2021-10-26 PROCEDURE — 77014 PR CT GUIDANCE PLACEMENT RAD THERAPY FIELDS: CPT | Performed by: SPECIALIST

## 2021-10-26 RX ORDER — HEPARIN SODIUM (PORCINE) LOCK FLUSH IV SOLN 100 UNIT/ML 100 UNIT/ML
500 SOLUTION INTRAVENOUS PRN
Status: CANCELLED | OUTPATIENT
Start: 2021-10-27

## 2021-10-26 RX ORDER — PALONOSETRON HYDROCHLORIDE 0.05 MG/ML
0.25 INJECTION, SOLUTION INTRAVENOUS ONCE
Status: CANCELLED | OUTPATIENT
Start: 2021-10-27

## 2021-10-26 RX ORDER — SODIUM CHLORIDE 9 MG/ML
20 INJECTION, SOLUTION INTRAVENOUS ONCE
Status: CANCELLED | OUTPATIENT
Start: 2021-10-27 | End: 2021-10-26

## 2021-10-26 RX ORDER — EPINEPHRINE 1 MG/ML
0.3 INJECTION, SOLUTION, CONCENTRATE INTRAVENOUS PRN
Status: CANCELLED | OUTPATIENT
Start: 2021-10-27

## 2021-10-26 RX ORDER — DEXAMETHASONE SODIUM PHOSPHATE 10 MG/ML
10 INJECTION, SOLUTION INTRAMUSCULAR; INTRAVENOUS ONCE
Status: CANCELLED | OUTPATIENT
Start: 2021-10-27

## 2021-10-26 RX ORDER — DIPHENHYDRAMINE HYDROCHLORIDE 50 MG/ML
50 INJECTION INTRAMUSCULAR; INTRAVENOUS ONCE
Status: CANCELLED | OUTPATIENT
Start: 2021-10-27 | End: 2021-10-26

## 2021-10-26 RX ORDER — SODIUM CHLORIDE 0.9 % (FLUSH) 0.9 %
5-40 SYRINGE (ML) INJECTION PRN
Status: CANCELLED | OUTPATIENT
Start: 2021-10-27

## 2021-10-26 RX ORDER — SODIUM CHLORIDE 9 MG/ML
25 INJECTION, SOLUTION INTRAVENOUS PRN
Status: CANCELLED | OUTPATIENT
Start: 2021-10-27

## 2021-10-26 RX ORDER — SODIUM CHLORIDE 9 MG/ML
INJECTION, SOLUTION INTRAVENOUS CONTINUOUS
Status: CANCELLED | OUTPATIENT
Start: 2021-10-27

## 2021-10-26 RX ORDER — METHYLPREDNISOLONE SODIUM SUCCINATE 125 MG/2ML
125 INJECTION, POWDER, LYOPHILIZED, FOR SOLUTION INTRAMUSCULAR; INTRAVENOUS ONCE
Status: CANCELLED | OUTPATIENT
Start: 2021-10-27 | End: 2021-10-26

## 2021-10-26 ASSESSMENT — PAIN SCALES - GENERAL: PAINLEVEL_OUTOF10: 8

## 2021-10-26 ASSESSMENT — PAIN DESCRIPTION - LOCATION: LOCATION: BACK

## 2021-10-26 NOTE — TELEPHONE ENCOUNTER
Spoke with Maryam Boles from Dr. Arias Roles office, updated her on patients missed appts and need for reschedule for mediport/PEG. She states that there will be available appts on either 10/29 or 11/1. She will notify the patient as soon as they can determine the date. [address] and updated Kieran on the change in date and need for initiate of enteral feedings ASAP once PEG is placed. Orders faxed. Will update Kieran on sx date as soon as determined.  Pardeep Phillips, RD,,LD

## 2021-10-26 NOTE — PROGRESS NOTES
Jemima Andre  10/26/2021  Wt Readings from Last 3 Encounters:   10/26/21 146 lb (66.2 kg)   10/21/21 154 lb 11.2 oz (70.2 kg)   10/20/21 146 lb (66.2 kg)     Body mass index is 22.87 kg/m². Treatment Area:H/N    Patient was seen today for weekly visit. Comfort Alteration  KPS:90%  Fatigue: Mild    Mucous Membrane Alteration  Mucositis Due to Radiation: No  Thrush: No  Voice Changes: Yes/ deep about 2 weeks    Nutritional Alteration  Anorexia: Yes   Nausea: Yes   Vomiting: Yes / sometimes     Skin Alteration   Sensation:itching    Radiation Dermatitis:  Slight redness, moisturizing 3 times daily    Ventilation Alteration  Cough:Yes/ to clear his throat    Emotional  Coping: effective    Injury, potential bleeding or infection: potential    Radioprotectant Tolerance  Yes            Lab Results   Component Value Date    WBC 7.3 10/19/2021     10/19/2021         /60   Pulse 97   Temp 97.2 °F (36.2 °C) (Temporal)   Resp 20   Wt 146 lb (66.2 kg)   SpO2 98%   BMI 22.87 kg/m²   BP within normal range? yes     Assessment/Plan:  Completed 10/37 fractions; 2000/7400 cGy, I gave him samples of juice and boost.  Updated Dr Cameron Gordon.     Shady Farnsworth RN

## 2021-10-26 NOTE — PROGRESS NOTES
DEPARTMENT OF RADIATION ONCOLOGY   ON TREATMENT VISIT       10/26/2021      NAME:  Giacomo Andre    YOB: 1955    DIAGNOSIS: [id number] SCC left oral tongue     SUBJECTIVE:   Stacey Pac has now received 2200 cGy in 11/37 fractions directed to the left oral tongue and bilateral manuela-neck. The patient is scheduled to receive chemotherapy tomorrow, reports sore throat/odynophagia managed with Percocet left oral tongue tumor has become noticeably softer. He was scheduled to undergo a PEG tube placement, but it was rescheduled because his neighbor, who provided a ride for him, had to go to the hospital on that same day to be evaluated for an aneurysm. That evaluation has been completed today, and the neighbor can drive the patient to the hospital again for the PEG tube placement. This has not been scheduled. Past medical, surgical, social and family histories reviewed and updated as indicated. PAIN: Sore throat/odynophagia. ALLERGIES:  Patient has no known allergies. Current Outpatient Medications   Medication Sig Dispense Refill    Magic Mouthwash (MIRACLE MOUTHWASH) Swish and swallow 15 mLs 4 times daily as needed for Irritation Please mix equal amounts of lidoccain viscous, benadryl and mylanta 480 mL 1    ondansetron (ZOFRAN) 8 MG tablet Take 1 tablet by mouth every 12 hours as needed for Nausea or Vomiting 30 tablet 1    prochlorperazine (COMPAZINE) 10 MG tablet Take 1 tablet by mouth every 6 hours as needed (nausea, vomiting) 30 tablet 2    oxyCODONE-acetaminophen (PERCOCET) 7.5-325 MG per tablet Take 1 tablet by mouth every 8 hours as needed.  ibuprofen (ADVIL;MOTRIN) 600 MG tablet       diazePAM (VALIUM) 5 MG tablet Take 5 mg by mouth 2 times daily.        albuterol sulfate  (90 Base) MCG/ACT inhaler INHALE 2 PUFFS INTO THE LUNGS EVERY 6 HOURS AS NEEDED FOR WHEEZING 54 g 3    ADVAIR DISKUS 250-50 MCG/DOSE AEPB Inhale 1 puff into the lungs 2 times daily        No current facility-administered medications for this encounter. OBJECTIVE:  Vitals: Temperature 97.2 F, blood pressure 102/60, pulse 97, respiration 20, 98% room air O2 saturation, weight 146 pounds per RN. Physical Examination:  Constitutional: 77 y.o. male who is alert, cooperative and in no apparent distress. The patient looks well. Inspection of the oral cavity and oropharynx shows no mucositis. There is a faint erythema in the neck, no moist desquamation. ASSESSMENT/PLAN:   Patient is doing well, tolerating radiation treatment, with PEG tube placement to be rescheduled. I have asked our department RN to contact [facility] to have the staff schedule the PEG tube for the patient. .  RT is to continue as planned.     Yosvany Blunt MD PhD  Radiation Oncologist, [facility] Radiation Oncology

## 2021-10-26 NOTE — TELEPHONE ENCOUNTER
Contacted patient due to missed appt for PEG/Port on 10/25/21. Patient states that he \"cant keep anything straight\" he has \"3 different calendars that all say the same thing\". Spoke with him about what his schedule is today. Reminded him to go to [facility] for his treatment at 12pm today, then to come to [facility] for his labs to be drawn. He will then return tomorrow for his chemotherapy. He was receptive to this. Patient desires for his PEG/Port to be rescheduled. Will contact MA from Dr. Pollard Grand office.    Robby Corona RD,,LD

## 2021-10-26 NOTE — PROGRESS NOTES
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 Referring Physician:  Sánchez Rader DO    PCP:  Avinash Shrestha MD    History of Present Illness:      Mr. Kodak Bianchi is a pleasant 58-year-old gentleman, with a past medical history significant for tobacco use disorder, COPD, and hepatitis C status post treatment in 2019, who had presented with left oral tongue lesion, when he developed a left neck mass, he was treated with a short course of antibiotics, which did not help, he subsequently had a work-up done, including a CT of the neck which had revealed an abnormal mass in the lateral aspect of the oral tongue involving a significant portion of the tongue measuring 4 x 2 x 2.8 cm, a level 2A lymph node was also noted, The patient underwent a laryngoscopy on 7/19/2021 which revealed a benign-appearing polyp on the left true vocal cord but a mass involving the left tonsil fossa. This was biopsied and found to be a invasive squamous cell carcinoma p16-positive. PET/CT was done on 8/3/2021, revealing a masslike structure involving the left tongue extending to the posterior pharyngeal wall crossing the midline. There was also metabolic activity noted in a zone 3 node on the ipsilateral side. The patient started on 10/12/2021 on concurrent chemoradiation using weekly cisplatin. He had nausea yesterday, he did not take the oral antiemetics, no vomiting, he has mouth pain. Review of Systems;  CONSTITUTIONAL: No fever, chills. Decreased appetite and energy level. Pos for weight loss. ENMT: Eyes: No diplopia; Nose: No epistaxis. Mouth: pos for mouth pain, odynophagia and dysphagia. Pos for hoarseness of the voice. RESPIRATORY: No hemoptysis, positive for mild chronic shortness of breath, cough.    CARDIOVASCULAR: No chest pain, palpitations. GASTROINTESTINAL: No nausea/vomiting, abdominal pain, diarrhea/constipation. GENITOURINARY: No dysuria, urinary frequency, hematuria. NEURO: No syncope, presyncope, headache. Remainder:  ROS NEGATIVE    Past Medical History:      Diagnosis Date    Cancer ([address]     Tongue    COPD (chronic obstructive pulmonary disease) ([address]     Edentulous     History of hepatitis C 2019    treated-2019    Hoarseness of voice     For OR 7-12-21    Mesothelioma ([address]     Mouth sores     Tongue mass     left     Patient Active Problem List   Diagnosis    COPD (chronic obstructive pulmonary disease) ([address]    Lumbar radiculopathy    Hepatitis C without hepatic coma    Tongue mass    Primary squamous cell carcinoma of head and neck ([address]        Past Surgical History:      Procedure Laterality Date    FRACTURE SURGERY      KNEE ARTHROSCOPY Left 1980    LARYNGOSCOPY N/A 7/12/2021    PANENDOSCOPY BRONCHOSCOPY ESOPHAGOSCOPY MICROSUSPENSION DIRECT LARYNGOSCOPY performed by Sánchez Rader DO at [facility] N/A 7/12/2021    BIOPSY OF TONGUE BASE WITH FROZEN SECTION AND LEFT TRUE VOCAL CORD BIOPSY performed by Sánchez Rader DO at [facility] OR       Family History:  Family History   Problem Relation Age of Onset    Cancer Father         lung       Medications:  Reviewed and reconciled.     Social History:  Social History     Socioeconomic History    Marital status: Single     Spouse name: Not on file    Number of children: 0    Years of education: Not on file    Highest education level: Not on file   Occupational History    Occupation: dairy compressor   Tobacco Use    Smoking status: Current Every Day Smoker     Packs/day: 0.50     Years: 45.00     Pack years: 22.50     Types: Cigarettes    Smokeless tobacco: Never Used   Vaping Use    Vaping Use: Never used   Substance and Sexual Activity    Alcohol use: Not Currently     Comment: QUIT 3 MONTHS AGO    Drug use: Never    Sexual activity: Not on file   Other Topics Concern    Not on file   Social History Narrative    Not on file     Social Determinants of Health     Financial Resource Strain:     Difficulty of Paying Living Expenses:    Food Insecurity:     Worried About Running Out of Food in the Last Year:     [address] in the Last Year:    Transportation Needs:     Lack of Transportation (Medical):  Lack of Transportation (Non-Medical):    Physical Activity:     Days of Exercise per Week:     Minutes of Exercise per Session:    Stress:     Feeling of Stress :    Social Connections:     Frequency of Communication with Friends and Family:     Frequency of Social Gatherings with Friends and Family:     Attends Pent[employer] Services:     Active Member of Clubs or Organizations:     Attends Club or Organization Meetings:     Marital Status:    Intimate Partner Violence:     Fear of Current or Ex-Partner:     Emotionally Abused:     Physically Abused:     Sexually Abused: Allergies:  No Known Allergies    Physical Exam:  /60   Ht 5' 7\" (1.702 m)   Wt 145 lb (65.8 kg)   SpO2 96%   BMI 22.71 kg/m²   GENERAL: Alert, oriented x 3, not in acute distress. HEENT: PERRLA; EOMI. Positive for trismus, limited motion of the tongue. NECK: Supple. He has fullness of the left neck. LUNGS: Good air entry bilaterally. No wheezing, crackles or rhonchi. CARDIOVASCULAR: Regular rate. No murmurs, rubs or gallops. ABDOMEN: Soft. Non-tender, non-distended. Positive bowel sounds. EXTREMITIES: Without clubbing, cyanosis, or edema. NEUROLOGIC: No focal deficits.    ECOG PS 1    Impression/Plan:       Mr. Jen Aguirre is a pleasant 61-year-old gentleman, with a past medical history significant for tobacco use disorder, COPD, and hepatitis C status post treatment in 2019, who had presented with left oral tongue lesion, when he developed a left neck mass, he was treated with a short course of antibiotics, which did not help, he subsequently had a work-up done, including a CT of the neck which had revealed an abnormal mass in the lateral aspect of the oral tongue involving a significant portion of the tongue measuring 4 x 2 x 2.8 cm, a level 2A lymph node was also noted, The patient underwent a laryngoscopy on 7/19/2021 which revealed a benign-appearing polyp on the left true vocal cord but a mass involving the left tonsil fossa. This was biopsied and found to be a invasive squamous cell carcinoma p16-positive. PET/CT was done on 8/3/2021, revealing a masslike structure involving the left tongue extending to the posterior pharyngeal wall crossing the midline. There was also metabolic activity noted in a zone 3 node on the ipsilateral side, clinical stage [id number] disease. I discussed with the patient his diagnosis, prognosis, as well as treatment with definitive concurrent chemo/RT using weekly cisplatin, the side effects and the schedule of the treatment were reviewed with the patient. The patient is agreeable to have a PEG placed, the patient was referred to surgery for a PEG and port placement, he is scheduled to have them done with Dr. Yaw Giraldo, referral was placed to palliative medicine for symptoms management. The patient started on 10/12/2021 on concurrent chemoradiation using weekly cisplatin. He is tolerating the treatment well overall, today 10/26/2021, labs reviewed, blood counts are adequate for treatment, the patient will receive cisplatin, cycle #3 tomorrow 10/26/2021 concurrently with radiation, continue Magic mouthwash, we reviewed the use of antiemetics as needed. RTC in 1 week. Thank you for allowing us to participate in the care of Mr. Shelbie Denise.     Dayne Deutsch MD   HEMATOLOGY/MEDICAL ONCOLOGY  [address] [place] [address] ONCOLOGY  [facility] [address] [facility] [address]  Dept: [phone]

## 2021-10-27 ENCOUNTER — HOSPITAL ENCOUNTER (OUTPATIENT)
Dept: RADIATION ONCOLOGY | Age: 66
Discharge: HOME | End: 2021-10-27
Attending: SPECIALIST

## 2021-10-27 ENCOUNTER — HOSPITAL ENCOUNTER (OUTPATIENT)
Dept: INFUSION THERAPY | Age: 66
Discharge: HOME | End: 2021-10-27
Payer: MEDICARE

## 2021-10-27 VITALS
HEART RATE: 60 BPM | OXYGEN SATURATION: 93 % | DIASTOLIC BLOOD PRESSURE: 64 MMHG | SYSTOLIC BLOOD PRESSURE: 119 MMHG | RESPIRATION RATE: 16 BRPM | TEMPERATURE: 98 F

## 2021-10-27 DIAGNOSIS — C76.0 PRIMARY SQUAMOUS CELL CARCINOMA OF HEAD AND NECK (HCC): Primary | ICD-10-CM

## 2021-10-27 PROCEDURE — 96413 CHEMO IV INFUSION 1 HR: CPT

## 2021-10-27 PROCEDURE — 96375 TX/PRO/DX INJ NEW DRUG ADDON: CPT

## 2021-10-27 PROCEDURE — 96366 THER/PROPH/DIAG IV INF ADDON: CPT

## 2021-10-27 PROCEDURE — 77014 PR CT GUIDANCE PLACEMENT RAD THERAPY FIELDS: CPT | Performed by: SPECIALIST

## 2021-10-27 PROCEDURE — 6360000002 HC RX W HCPCS: Performed by: INTERNAL MEDICINE

## 2021-10-27 PROCEDURE — 2580000003 HC RX 258: Performed by: INTERNAL MEDICINE

## 2021-10-27 PROCEDURE — 96415 CHEMO IV INFUSION ADDL HR: CPT

## 2021-10-27 PROCEDURE — 77386 HC NTSTY MODUL RAD TX DLVR CPLX: CPT | Performed by: SPECIALIST

## 2021-10-27 RX ORDER — DEXAMETHASONE SODIUM PHOSPHATE 10 MG/ML
10 INJECTION, SOLUTION INTRAMUSCULAR; INTRAVENOUS ONCE
Status: COMPLETED | OUTPATIENT
Start: 2021-10-27 | End: 2021-10-27

## 2021-10-27 RX ORDER — PALONOSETRON HYDROCHLORIDE 0.05 MG/ML
0.25 INJECTION, SOLUTION INTRAVENOUS ONCE
Status: COMPLETED | OUTPATIENT
Start: 2021-10-27 | End: 2021-10-27

## 2021-10-27 RX ORDER — SODIUM CHLORIDE 0.9 % (FLUSH) 0.9 %
5-40 SYRINGE (ML) INJECTION PRN
Status: DISCONTINUED | OUTPATIENT
Start: 2021-10-27 | End: 2021-10-28 | Stop reason: HOSPADM

## 2021-10-27 RX ORDER — SODIUM CHLORIDE 9 MG/ML
20 INJECTION, SOLUTION INTRAVENOUS ONCE
Status: DISCONTINUED | OUTPATIENT
Start: 2021-10-27 | End: 2021-10-28 | Stop reason: HOSPADM

## 2021-10-27 RX ADMIN — PALONOSETRON 0.25 MG: 0.25 INJECTION, SOLUTION INTRAVENOUS at 10:04

## 2021-10-27 RX ADMIN — SODIUM CHLORIDE, PRESERVATIVE FREE 10 ML: 5 INJECTION INTRAVENOUS at 10:08

## 2021-10-27 RX ADMIN — SODIUM CHLORIDE 20 ML/HR: 9 INJECTION, SOLUTION INTRAVENOUS at 10:02

## 2021-10-27 RX ADMIN — SODIUM CHLORIDE, PRESERVATIVE FREE 10 ML: 5 INJECTION INTRAVENOUS at 10:02

## 2021-10-27 RX ADMIN — POTASSIUM CHLORIDE: 2 INJECTION, SOLUTION, CONCENTRATE INTRAVENOUS at 11:02

## 2021-10-27 RX ADMIN — FOSAPREPITANT 150 MG: 150 INJECTION, POWDER, LYOPHILIZED, FOR SOLUTION INTRAVENOUS at 10:16

## 2021-10-27 RX ADMIN — DEXAMETHASONE SODIUM PHOSPHATE 10 MG: 10 INJECTION, SOLUTION INTRAMUSCULAR; INTRAVENOUS at 10:08

## 2021-10-28 ENCOUNTER — HOSPITAL ENCOUNTER (OUTPATIENT)
Dept: RADIATION ONCOLOGY | Age: 66
End: 2021-10-28
Attending: SPECIALIST
Payer: MEDICARE

## 2021-10-28 ENCOUNTER — TELEPHONE (OUTPATIENT)
Dept: INFUSION THERAPY | Age: 66
End: 2021-10-28

## 2021-10-28 ENCOUNTER — TELEPHONE (OUTPATIENT)
Dept: SURGERY | Age: 66
End: 2021-10-28

## 2021-10-28 ENCOUNTER — HOSPITAL ENCOUNTER (OUTPATIENT)
Dept: RADIATION ONCOLOGY | Age: 66
Discharge: HOME | End: 2021-10-28
Attending: SPECIALIST

## 2021-10-28 PROCEDURE — 77014 PR CT GUIDANCE PLACEMENT RAD THERAPY FIELDS: CPT | Performed by: SPECIALIST

## 2021-10-28 PROCEDURE — 77386 HC NTSTY MODUL RAD TX DLVR CPLX: CPT | Performed by: SPECIALIST

## 2021-10-28 NOTE — TELEPHONE ENCOUNTER
[facility] patient can go in for therapy at 7:45am on Monday prior to his procedure. MA contacted patient to confirm and review times and locations. Patient verbalized confirmation and understanding.      Electronically signed by Brandee Oliveros on 10/28/21 at 3:00 PM EDT

## 2021-10-28 NOTE — TELEPHONE ENCOUNTER
MA scheduled patient for Mediport and PEG tube placement on Monday, 11/1/2021 @ 10:30am, arrival time 8:30am with Dr Rajat Regalado in [facility]. MA was awaiting return call from oncology/radiation to confirm if therapy could be completed before or after surgery before notify patient so patient could be informed of all instructions. Patient called in stating he had not heard back from the office in regards to procedure. MA informed patient of scheduled procedure and instructions. Patient informed we are awaiting a return call to confirm if therapy is to be completed before surgery or after. MA informed patient he will receive a return call. Patient verbalized understanding stating he does have a ride for Monday and will be able to make it for his procedure and therapy either way.      Electronically signed by Kaela Carolina on 10/28/21 at 1:58 PM EDT

## 2021-10-28 NOTE — TELEPHONE ENCOUNTER
Patient left  for this clinician, he says he cannot get his pills down and is having trouble swallowing, needs the PEG and port. Spoke with patient and reviewed how he can crush his pills, as long as they are not extended release. Patient says he can swallow water and boost, he is trying to do as many as he can. This clinician received call from Ashley Ortega MA from surgery and she is asking if patient can come for his PEG/Port on 11/1 at 8:30am (surgery is at 10:30). Patient has RT at 8:30am, but spoke with RT therapists and they will plan to take patient at 7:45am and then send him right to surgery. Contacted patient to update him on plan for RT at 7:45am, followed by surgery on Monday 11/1. Reiterated the adjusted schedule several times, due to patient having two missed PEG/Port placements so far. He was receptive. Encouraged as much PO intake as he can over the weekend. Advised him to go to the ED with any concerns, weakness, dizziness, etc. He verbalized understanding.  Tanya Chavis RD,,LD

## 2021-10-29 ENCOUNTER — HOSPITAL ENCOUNTER (OUTPATIENT)
Dept: RADIATION ONCOLOGY | Age: 66
Discharge: HOME | End: 2021-10-29
Attending: SPECIALIST
Payer: MEDICARE

## 2021-10-29 PROCEDURE — 77014 PR CT GUIDANCE PLACEMENT RAD THERAPY FIELDS: CPT | Performed by: SPECIALIST

## 2021-10-29 PROCEDURE — 77386 HC NTSTY MODUL RAD TX DLVR CPLX: CPT | Performed by: SPECIALIST

## 2021-11-01 ENCOUNTER — TELEPHONE (OUTPATIENT)
Dept: CASE MANAGEMENT | Age: 66
End: 2021-11-01

## 2021-11-01 ENCOUNTER — HOSPITAL ENCOUNTER (OUTPATIENT)
Dept: RADIATION ONCOLOGY | Age: 66
Discharge: HOME | End: 2021-11-01
Attending: SPECIALIST
Payer: MEDICARE

## 2021-11-01 PROCEDURE — 77014 PR CT GUIDANCE PLACEMENT RAD THERAPY FIELDS: CPT | Performed by: SPECIALIST

## 2021-11-01 PROCEDURE — 77386 HC NTSTY MODUL RAD TX DLVR CPLX: CPT | Performed by: SPECIALIST

## 2021-11-01 NOTE — TELEPHONE ENCOUNTER
Patient showed up for Radiation Therapy. He was scheduled with outpatient Surgery this morning for PEG/PORT insertion. He was to have a rapid covid test at 0730 then have the surgery at 1030. Radiation Reception talked to outpatient surgery regarding patient schedule for procedure. Patient was due there this morning. Met with Parveen Bazan and talked to him about the surgery. He states he talked to someone on Friday and canceled the procedure. He does not want the PEG or the PORT. He states they can do his remaining treatments through his arm and he is eating and drinking fine. Reviewed PEG/PORT information and how they would be used. He states he lives alone and doesn't want to have to deal with it. Reviewed that he would have assistance in learning how to use the PEG. He still is addiment on not having the surgery and is refusing. Surgery, Medical Oncology and [facility] were all updated.

## 2021-11-02 ENCOUNTER — HOSPITAL ENCOUNTER (OUTPATIENT)
Dept: RADIATION ONCOLOGY | Age: 66
Discharge: HOME | End: 2021-11-02
Attending: SPECIALIST
Payer: MEDICARE

## 2021-11-02 ENCOUNTER — OFFICE VISIT (OUTPATIENT)
Dept: ONCOLOGY | Age: 66
End: 2021-11-02
Payer: MEDICARE

## 2021-11-02 ENCOUNTER — HOSPITAL ENCOUNTER (OUTPATIENT)
Dept: INFUSION THERAPY | Age: 66
Discharge: HOME | End: 2021-11-02
Payer: MEDICARE

## 2021-11-02 VITALS
WEIGHT: 146 LBS | HEIGHT: 67 IN | BODY MASS INDEX: 22.91 KG/M2 | SYSTOLIC BLOOD PRESSURE: 118 MMHG | DIASTOLIC BLOOD PRESSURE: 86 MMHG

## 2021-11-02 VITALS
WEIGHT: 146.6 LBS | BODY MASS INDEX: 22.96 KG/M2 | TEMPERATURE: 97.8 F | HEART RATE: 81 BPM | DIASTOLIC BLOOD PRESSURE: 86 MMHG | RESPIRATION RATE: 18 BRPM | OXYGEN SATURATION: 98 % | SYSTOLIC BLOOD PRESSURE: 118 MMHG

## 2021-11-02 DIAGNOSIS — C76.0 PRIMARY SQUAMOUS CELL CARCINOMA OF HEAD AND NECK (HCC): Primary | ICD-10-CM

## 2021-11-02 DIAGNOSIS — C76.0 PRIMARY SQUAMOUS CELL CARCINOMA OF HEAD AND NECK (HCC): ICD-10-CM

## 2021-11-02 LAB
ALBUMIN SERPL-MCNC: 4.1 G/DL (ref 3.5–5.2)
ALP BLD-CCNC: 86 U/L (ref 40–129)
ALT SERPL-CCNC: 13 U/L (ref 0–40)
ANION GAP SERPL CALCULATED.3IONS-SCNC: 12 MMOL/L (ref 7–16)
AST SERPL-CCNC: 18 U/L (ref 0–39)
BASOPHILS ABSOLUTE: 0.02 E9/L (ref 0–0.2)
BASOPHILS RELATIVE PERCENT: 0.5 % (ref 0–2)
BILIRUB SERPL-MCNC: 0.4 MG/DL (ref 0–1.2)
BUN BLDV-MCNC: 9 MG/DL (ref 6–23)
CALCIUM SERPL-MCNC: 10.5 MG/DL (ref 8.6–10.2)
CHLORIDE BLD-SCNC: 99 MMOL/L (ref 98–107)
CO2: 24 MMOL/L (ref 22–29)
CREAT SERPL-MCNC: 0.7 MG/DL (ref 0.7–1.2)
EOSINOPHILS ABSOLUTE: 0.06 E9/L (ref 0.05–0.5)
EOSINOPHILS RELATIVE PERCENT: 1.5 % (ref 0–6)
GFR AFRICAN AMERICAN: >60
GFR NON-AFRICAN AMERICAN: >60 ML/MIN/1.73
GLUCOSE BLD-MCNC: 97 MG/DL (ref 74–99)
HCT VFR BLD CALC: 41.6 % (ref 37–54)
HEMOGLOBIN: 14.2 G/DL (ref 12.5–16.5)
IMMATURE GRANULOCYTES #: 0.01 E9/L
IMMATURE GRANULOCYTES %: 0.2 % (ref 0–5)
LYMPHOCYTES ABSOLUTE: 0.31 E9/L (ref 1.5–4)
LYMPHOCYTES RELATIVE PERCENT: 7.5 % (ref 20–42)
MAGNESIUM: 1.7 MG/DL (ref 1.6–2.6)
MCH RBC QN AUTO: 31.1 PG (ref 26–35)
MCHC RBC AUTO-ENTMCNC: 34.1 % (ref 32–34.5)
MCV RBC AUTO: 91.2 FL (ref 80–99.9)
MONOCYTES ABSOLUTE: 0.36 E9/L (ref 0.1–0.95)
MONOCYTES RELATIVE PERCENT: 8.7 % (ref 2–12)
NEUTROPHILS ABSOLUTE: 3.37 E9/L (ref 1.8–7.3)
NEUTROPHILS RELATIVE PERCENT: 81.6 % (ref 43–80)
PDW BLD-RTO: 12.8 FL (ref 11.5–15)
PLATELET # BLD: 95 E9/L (ref 130–450)
PLATELET CONFIRMATION: NORMAL
PMV BLD AUTO: 11.6 FL (ref 7–12)
POTASSIUM SERPL-SCNC: 4 MMOL/L (ref 3.5–5)
RBC # BLD: 4.56 E12/L (ref 3.8–5.8)
RBC # BLD: NORMAL 10*6/UL
SODIUM BLD-SCNC: 135 MMOL/L (ref 132–146)
TOTAL PROTEIN: 7.4 G/DL (ref 6.4–8.3)
WBC # BLD: 4.1 E9/L (ref 4.5–11.5)

## 2021-11-02 PROCEDURE — 77427 RADIATION TX MANAGEMENT X5: CPT | Performed by: SPECIALIST

## 2021-11-02 PROCEDURE — 77386 HC NTSTY MODUL RAD TX DLVR CPLX: CPT | Performed by: SPECIALIST

## 2021-11-02 PROCEDURE — 83735 ASSAY OF MAGNESIUM: CPT

## 2021-11-02 PROCEDURE — 77336 RADIATION PHYSICS CONSULT: CPT | Performed by: SPECIALIST

## 2021-11-02 PROCEDURE — 99212 OFFICE O/P EST SF 10 MIN: CPT

## 2021-11-02 PROCEDURE — 36415 COLL VENOUS BLD VENIPUNCTURE: CPT

## 2021-11-02 PROCEDURE — 99214 OFFICE O/P EST MOD 30 MIN: CPT | Performed by: INTERNAL MEDICINE

## 2021-11-02 PROCEDURE — 77014 PR CT GUIDANCE PLACEMENT RAD THERAPY FIELDS: CPT | Performed by: SPECIALIST

## 2021-11-02 PROCEDURE — 80053 COMPREHEN METABOLIC PANEL: CPT

## 2021-11-02 PROCEDURE — 85025 COMPLETE CBC W/AUTO DIFF WBC: CPT

## 2021-11-02 NOTE — PROGRESS NOTES
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 Referring Physician:  Sukumar Landon DO    PCP:  Elsy Morris MD    History of Present Illness:      Mr. Shelbie Denise is a pleasant 78-year-old gentleman, with a past medical history significant for tobacco use disorder, COPD, and hepatitis C status post treatment in 2019, who had presented with left oral tongue lesion, when he developed a left neck mass, he was treated with a short course of antibiotics, which did not help, he subsequently had a work-up done, including a CT of the neck which had revealed an abnormal mass in the lateral aspect of the oral tongue involving a significant portion of the tongue measuring 4 x 2 x 2.8 cm, a level 2A lymph node was also noted, The patient underwent a laryngoscopy on 7/19/2021 which revealed a benign-appearing polyp on the left true vocal cord but a mass involving the left tonsil fossa. This was biopsied and found to be a invasive squamous cell carcinoma p16-positive. PET/CT was done on 8/3/2021, revealing a masslike structure involving the left tongue extending to the posterior pharyngeal wall crossing the midline. There was also metabolic activity noted in a zone 3 node on the ipsilateral side. The patient started on 10/12/2021 on concurrent chemoradiation using weekly cisplatin. He denies any nausea or vomiting, he has mouth pain, the symptoms related to the cancer are improving. Review of Systems;  CONSTITUTIONAL: No fever, chills. Decreased appetite and energy level. Pos for weight loss. ENMT: Eyes: No diplopia; Nose: No epistaxis. Mouth: pos for mouth pain, odynophagia and dysphagia. Pos for hoarseness of the voice. RESPIRATORY: No hemoptysis, positive for mild chronic shortness of breath, cough.    CARDIOVASCULAR: No chest pain, palpitations. GASTROINTESTINAL: No nausea/vomiting, abdominal pain, diarrhea/constipation. GENITOURINARY: No dysuria, urinary frequency, hematuria. NEURO: No syncope, presyncope, headache. Remainder:  ROS NEGATIVE    Past Medical History:      Diagnosis Date    Cancer ([address]     Tongue    COPD (chronic obstructive pulmonary disease) ([address]     Edentulous     History of hepatitis C 2019    treated-2019    Hoarseness of voice     For OR 7-12-21    Mesothelioma ([address]     Mouth sores     Tongue mass     left     Patient Active Problem List   Diagnosis    COPD (chronic obstructive pulmonary disease) ([address]    Lumbar radiculopathy    Hepatitis C without hepatic coma    Tongue mass    Primary squamous cell carcinoma of head and neck ([address]        Past Surgical History:      Procedure Laterality Date    FRACTURE SURGERY      KNEE ARTHROSCOPY Left 1980    LARYNGOSCOPY N/A 7/12/2021    PANENDOSCOPY BRONCHOSCOPY ESOPHAGOSCOPY MICROSUSPENSION DIRECT LARYNGOSCOPY performed by Fiona Colon DO at [facility] N/A 7/12/2021    BIOPSY OF TONGUE BASE WITH FROZEN SECTION AND LEFT TRUE VOCAL CORD BIOPSY performed by Fiona Colon DO at [facility] OR       Family History:  Family History   Problem Relation Age of Onset    Cancer Father         lung       Medications:  Reviewed and reconciled.     Social History:  Social History     Socioeconomic History    Marital status: Single     Spouse name: Not on file    Number of children: 0    Years of education: Not on file    Highest education level: Not on file   Occupational History    Occupation: dairy compressor   Tobacco Use    Smoking status: Current Every Day Smoker     Packs/day: 0.50     Years: 45.00     Pack years: 22.50     Types: Cigarettes    Smokeless tobacco: Never Used   Vaping Use    Vaping Use: Never used   Substance and Sexual Activity    Alcohol use: Not Currently    Drug use: Never    Sexual activity: Not on file   Other Topics Concern    Not on file   Social History Narrative    Not on file     Social Determinants of Health     Financial Resource Strain:     Difficulty of Paying Living Expenses:    Food Insecurity:     Worried About Running Out of Food in the Last Year:     [address] in the Last Year:    Transportation Needs:     Lack of Transportation (Medical):  Lack of Transportation (Non-Medical):    Physical Activity:     Days of Exercise per Week:     Minutes of Exercise per Session:    Stress:     Feeling of Stress :    Social Connections:     Frequency of Communication with Friends and Family:     Frequency of Social Gatherings with Friends and Family:     Attends Muslim Services:     Active Member of Clubs or Organizations:     Attends Club or Organization Meetings:     Marital Status:    Intimate Partner Violence:     Fear of Current or Ex-Partner:     Emotionally Abused:     Physically Abused:     Sexually Abused: Allergies:  No Known Allergies    Physical Exam:  /86   Ht 5' 7\" (1.702 m)   Wt 146 lb (66.2 kg)   BMI 22.87 kg/m²     GENERAL: Alert, oriented x 3, not in acute distress. HEENT: PERRLA; EOMI. Positive for trismus, limited motion of the tongue. NECK: Supple. Positive for radiation dermatitis. He has fullness of the left neck. LUNGS: Good air entry bilaterally. No wheezing, crackles or rhonchi. CARDIOVASCULAR: Regular rate. No murmurs, rubs or gallops. ABDOMEN: Soft. Non-tender, non-distended. Positive bowel sounds. EXTREMITIES: Without clubbing, cyanosis, or edema. NEUROLOGIC: No focal deficits.    ECOG PS 1    Impression/Plan:       Mr. Debbie Stanley is a pleasant 26-year-old gentleman, with a past medical history significant for tobacco use disorder, COPD, and hepatitis C status post treatment in 2019, who had presented with left oral tongue lesion, when he developed a left neck mass, he was treated with a short course of antibiotics, which did not help, he subsequently had a work-up done, including a CT of the neck which had revealed an abnormal mass in the lateral aspect of the oral tongue involving a significant portion of the tongue measuring 4 x 2 x 2.8 cm, a level 2A lymph node was also noted, The patient underwent a laryngoscopy on 7/19/2021 which revealed a benign-appearing polyp on the left true vocal cord but a mass involving the left tonsil fossa. This was biopsied and found to be a invasive squamous cell carcinoma p16-positive. PET/CT was done on 8/3/2021, revealing a masslike structure involving the left tongue extending to the posterior pharyngeal wall crossing the midline. There was also metabolic activity noted in a zone 3 node on the ipsilateral side, clinical stage [id number] disease. I discussed with the patient his diagnosis, prognosis, as well as treatment with definitive concurrent chemo/RT using weekly cisplatin, the side effects and the schedule of the treatment were reviewed with the patient. The patient had decided against having a PEG and port placed, referral was placed to palliative medicine for symptoms management. The patient was started on 10/12/2021 on concurrent chemoradiation using weekly cisplatin. He is tolerating the treatment well overall, today 11/2/2021, labs reviewed, blood counts are adequate for treatment, platelet count is 11E, he has plts clumping, the patient will receive cisplatin, cycle #4 tomorrow 11/3/2021 concurrently with radiation, continue Magic mouthwash. He will let me know if he has any new problems. RTC in 1 week. Thank you for allowing us to participate in the care of Mr. Linda Aldana.     Veronika Miles MD   HEMATOLOGY/MEDICAL ONCOLOGY  [address] [place] [address] MED ONCOLOGY  [facility] [address] [facility] [place] [facility] [address]  Dept: [phone]

## 2021-11-02 NOTE — PROGRESS NOTES
Giacomo Brady Jes  11/2/2021  Wt Readings from Last 3 Encounters:   11/02/21 146 lb 9.6 oz (66.5 kg)   10/26/21 145 lb (65.8 kg)   10/26/21 146 lb (66.2 kg)     Body mass index is 22.96 kg/m². Treatment Area:PTV 0124    Patient was seen today for weekly visit. Comfort Alteration  KPS:90%  Fatigue: Mild    Mucous Membrane Alteration  Mucositis Due to Radiation: No  Thrush: No  Voice Changes: No    Nutritional Alteration  Anorexia: No   Nausea: No   Vomiting: No     Skin Alteration   Sensation:yes, red but intact    Radiation Dermatitis:  Yes, see above    Ventilation Alteration  Cough:No    Emotional  Coping: effective    Injury, potential bleeding or infection: no    Radioprotectant Tolerance  Yes            Lab Results   Component Value Date    WBC 8.0 10/26/2021     10/26/2021         /86   Pulse 81   Temp 97.8 °F (36.6 °C) (Temporal)   Resp 18   Wt 146 lb 9.6 oz (66.5 kg)   SpO2 98%   BMI 22.96 kg/m²   BP within normal range? yes       Assessment/Plan: Pt completed 15/37fx and 3000/7000cGy. Pt is tolerating tx well thus far.      Halina Doyle RN

## 2021-11-02 NOTE — PROGRESS NOTES
DEPARTMENT OF RADIATION ONCOLOGY   ON TREATMENT VISIT       11/2/2021      NAME:  Mirza Andre    YOB: 1955    DIAGNOSIS: [id number] SCC left oral tongue    SUBJECTIVE:   Saul Pacheco has now received 3000 cGy in 15/37 fractions directed to the left oral tongue and bilateral manuela-neck      Past medical, surgical, social and family histories reviewed and updated as indicated. PAIN: 1/10    ALLERGIES:  Patient has no known allergies. Current Outpatient Medications   Medication Sig Dispense Refill    ondansetron (ZOFRAN) 8 MG tablet Take 1 tablet by mouth every 12 hours as needed for Nausea or Vomiting 30 tablet 1    prochlorperazine (COMPAZINE) 10 MG tablet Take 1 tablet by mouth every 6 hours as needed (nausea, vomiting) 30 tablet 2    oxyCODONE-acetaminophen (PERCOCET) 7.5-325 MG per tablet Take 1 tablet by mouth every 8 hours as needed.  diazePAM (VALIUM) 5 MG tablet Take 5 mg by mouth 2 times daily.  albuterol sulfate  (90 Base) MCG/ACT inhaler INHALE 2 PUFFS INTO THE LUNGS EVERY 6 HOURS AS NEEDED FOR WHEEZING 54 g 3    ADVAIR DISKUS 250-50 MCG/DOSE AEPB Inhale 1 puff into the lungs 2 times daily        No current facility-administered medications for this encounter. OBJECTIVE:  Alert and fully ambulatory. Pleasant and conversant. Physical Examination:General appearance - alert, well appearing, and in no distress:  Constitutional: A well developed, well nourished 77 y.o. male who is alert, oriented, cooperative and in no apparent distress. HEENT: Grade 1 mucositis without infection. Grade 1 xerostomia. Skin:  Warm and dry. No obvious rashes.     Vitals:    11/02/21 0901   BP: 118/86   Pulse: 81   Resp: 18   Temp: 97.8 °F (36.6 °C)   TempSrc: Temporal   SpO2: 98%   Weight: 146 lb 9.6 oz (66.5 kg)       Wt Readings from Last 3 Encounters:   11/02/21 146 lb 9.6 oz (66.5 kg)   10/26/21 145 lb (65.8 kg)   10/26/21 146 lb (66.2 kg) ASSESSMENT/PLAN:     Patient is tolerating treatments well with expected toxicities. Pt did not have feeding tube place. Weight is down but will double his efforts to increase caloric intake. Current and planned dose reviewed. Goals of treatment and potential side effects were reviewed with the patient. Treatment imaging has been personally reviewed for accuracy and precision. Questions answered to apparent satisfaction. Treatments will continue as planned. Thank you for the opportunity to participate in multidisciplinary management of this remarkable and pleasant patient.       Miranda Talley MD, [address], Liz Short, [address] [place] [address]    Department of Radiation Oncology  [address]: [phone] (CTR: [phone])  [address] [place] [address]: [phone] (EQV: [phone])  [facility]:  [phone] ([facility]IC:  [phone])

## 2021-11-03 ENCOUNTER — HOSPITAL ENCOUNTER (OUTPATIENT)
Dept: INFUSION THERAPY | Age: 66
Discharge: HOME | End: 2021-11-03
Payer: MEDICARE

## 2021-11-03 ENCOUNTER — APPOINTMENT (OUTPATIENT)
Dept: RADIATION ONCOLOGY | Age: 66
End: 2021-11-03
Attending: SPECIALIST
Payer: MEDICARE

## 2021-11-03 ENCOUNTER — HOSPITAL ENCOUNTER (OUTPATIENT)
Dept: RADIATION ONCOLOGY | Age: 66
Discharge: HOME | End: 2021-11-03
Attending: SPECIALIST
Payer: MEDICARE

## 2021-11-03 VITALS
SYSTOLIC BLOOD PRESSURE: 136 MMHG | OXYGEN SATURATION: 98 % | DIASTOLIC BLOOD PRESSURE: 69 MMHG | TEMPERATURE: 96 F | RESPIRATION RATE: 16 BRPM | HEART RATE: 58 BPM

## 2021-11-03 DIAGNOSIS — C76.0 PRIMARY SQUAMOUS CELL CARCINOMA OF HEAD AND NECK (HCC): Primary | ICD-10-CM

## 2021-11-03 PROCEDURE — 96367 TX/PROPH/DG ADDL SEQ IV INF: CPT

## 2021-11-03 PROCEDURE — 77014 PR CT GUIDANCE PLACEMENT RAD THERAPY FIELDS: CPT | Performed by: SPECIALIST

## 2021-11-03 PROCEDURE — 2580000003 HC RX 258: Performed by: INTERNAL MEDICINE

## 2021-11-03 PROCEDURE — 77386 HC NTSTY MODUL RAD TX DLVR CPLX: CPT | Performed by: SPECIALIST

## 2021-11-03 PROCEDURE — 96415 CHEMO IV INFUSION ADDL HR: CPT

## 2021-11-03 PROCEDURE — 96413 CHEMO IV INFUSION 1 HR: CPT

## 2021-11-03 PROCEDURE — 6370000000 HC RX 637 (ALT 250 FOR IP): Performed by: INTERNAL MEDICINE

## 2021-11-03 PROCEDURE — 6360000002 HC RX W HCPCS: Performed by: INTERNAL MEDICINE

## 2021-11-03 PROCEDURE — 96375 TX/PRO/DX INJ NEW DRUG ADDON: CPT

## 2021-11-03 RX ORDER — PALONOSETRON HYDROCHLORIDE 0.05 MG/ML
0.25 INJECTION, SOLUTION INTRAVENOUS ONCE
Status: CANCELLED | OUTPATIENT
Start: 2021-11-03

## 2021-11-03 RX ORDER — DIPHENHYDRAMINE HYDROCHLORIDE 50 MG/ML
50 INJECTION INTRAMUSCULAR; INTRAVENOUS ONCE
Status: CANCELLED | OUTPATIENT
Start: 2021-11-03 | End: 2021-11-03

## 2021-11-03 RX ORDER — EPINEPHRINE 1 MG/ML
0.3 INJECTION, SOLUTION, CONCENTRATE INTRAVENOUS PRN
Status: CANCELLED | OUTPATIENT
Start: 2021-11-03

## 2021-11-03 RX ORDER — ACETAMINOPHEN 500 MG
1000 TABLET ORAL ONCE
Status: COMPLETED | OUTPATIENT
Start: 2021-11-03 | End: 2021-11-03

## 2021-11-03 RX ORDER — DEXAMETHASONE SODIUM PHOSPHATE 10 MG/ML
10 INJECTION, SOLUTION INTRAMUSCULAR; INTRAVENOUS ONCE
Status: COMPLETED | OUTPATIENT
Start: 2021-11-03 | End: 2021-11-03

## 2021-11-03 RX ORDER — SODIUM CHLORIDE 0.9 % (FLUSH) 0.9 %
5-40 SYRINGE (ML) INJECTION PRN
Status: DISCONTINUED | OUTPATIENT
Start: 2021-11-03 | End: 2021-11-04 | Stop reason: HOSPADM

## 2021-11-03 RX ORDER — METHYLPREDNISOLONE SODIUM SUCCINATE 125 MG/2ML
125 INJECTION, POWDER, LYOPHILIZED, FOR SOLUTION INTRAMUSCULAR; INTRAVENOUS ONCE
Status: CANCELLED | OUTPATIENT
Start: 2021-11-03 | End: 2021-11-03

## 2021-11-03 RX ORDER — SODIUM CHLORIDE 0.9 % (FLUSH) 0.9 %
5-40 SYRINGE (ML) INJECTION PRN
Status: CANCELLED | OUTPATIENT
Start: 2021-11-03

## 2021-11-03 RX ORDER — SODIUM CHLORIDE 9 MG/ML
20 INJECTION, SOLUTION INTRAVENOUS ONCE
Status: CANCELLED | OUTPATIENT
Start: 2021-11-03 | End: 2021-11-03

## 2021-11-03 RX ORDER — PALONOSETRON HYDROCHLORIDE 0.05 MG/ML
0.25 INJECTION, SOLUTION INTRAVENOUS ONCE
Status: COMPLETED | OUTPATIENT
Start: 2021-11-03 | End: 2021-11-03

## 2021-11-03 RX ORDER — SODIUM CHLORIDE 9 MG/ML
25 INJECTION, SOLUTION INTRAVENOUS PRN
Status: CANCELLED | OUTPATIENT
Start: 2021-11-03

## 2021-11-03 RX ORDER — DEXAMETHASONE SODIUM PHOSPHATE 10 MG/ML
10 INJECTION, SOLUTION INTRAMUSCULAR; INTRAVENOUS ONCE
Status: CANCELLED | OUTPATIENT
Start: 2021-11-03

## 2021-11-03 RX ORDER — SODIUM CHLORIDE 9 MG/ML
INJECTION, SOLUTION INTRAVENOUS CONTINUOUS
Status: CANCELLED | OUTPATIENT
Start: 2021-11-03

## 2021-11-03 RX ORDER — HEPARIN SODIUM (PORCINE) LOCK FLUSH IV SOLN 100 UNIT/ML 100 UNIT/ML
500 SOLUTION INTRAVENOUS PRN
Status: CANCELLED | OUTPATIENT
Start: 2021-11-03

## 2021-11-03 RX ORDER — SODIUM CHLORIDE 9 MG/ML
20 INJECTION, SOLUTION INTRAVENOUS ONCE
Status: DISCONTINUED | OUTPATIENT
Start: 2021-11-03 | End: 2021-11-04 | Stop reason: HOSPADM

## 2021-11-03 RX ADMIN — POTASSIUM CHLORIDE: 2 INJECTION, SOLUTION, CONCENTRATE INTRAVENOUS at 10:50

## 2021-11-03 RX ADMIN — FOSAPREPITANT 150 MG: 150 INJECTION, POWDER, LYOPHILIZED, FOR SOLUTION INTRAVENOUS at 10:13

## 2021-11-03 RX ADMIN — SODIUM CHLORIDE 20 ML/HR: 9 INJECTION, SOLUTION INTRAVENOUS at 09:50

## 2021-11-03 RX ADMIN — ACETAMINOPHEN 1000 MG: 500 TABLET ORAL at 12:07

## 2021-11-03 RX ADMIN — PALONOSETRON 0.25 MG: 0.25 INJECTION, SOLUTION INTRAVENOUS at 10:01

## 2021-11-03 RX ADMIN — SODIUM CHLORIDE, PRESERVATIVE FREE 10 ML: 5 INJECTION INTRAVENOUS at 09:55

## 2021-11-03 RX ADMIN — DEXAMETHASONE SODIUM PHOSPHATE 10 MG: 10 INJECTION, SOLUTION INTRAMUSCULAR; INTRAVENOUS at 09:55

## 2021-11-03 ASSESSMENT — PAIN SCALES - GENERAL: PAINLEVEL_OUTOF10: 9

## 2021-11-03 NOTE — ADDENDUM NOTE
Addended by: Beth Ahn on: 11/3/2021 09:39 AM     Modules accepted: Orders I will SWITCH the dose or number of times a day I take the medications listed below when I get home from the hospital:  None

## 2021-11-04 ENCOUNTER — APPOINTMENT (OUTPATIENT)
Dept: RADIATION ONCOLOGY | Age: 66
End: 2021-11-04
Attending: SPECIALIST
Payer: MEDICARE

## 2021-11-04 ENCOUNTER — HOSPITAL ENCOUNTER (OUTPATIENT)
Dept: RADIATION ONCOLOGY | Age: 66
Discharge: HOME | End: 2021-11-04
Attending: SPECIALIST
Payer: MEDICARE

## 2021-11-04 PROCEDURE — 77014 PR CT GUIDANCE PLACEMENT RAD THERAPY FIELDS: CPT | Performed by: SPECIALIST

## 2021-11-04 PROCEDURE — 77386 HC NTSTY MODUL RAD TX DLVR CPLX: CPT | Performed by: SPECIALIST

## 2021-11-05 ENCOUNTER — HOSPITAL ENCOUNTER (OUTPATIENT)
Dept: RADIATION ONCOLOGY | Age: 66
Discharge: HOME | End: 2021-11-05
Attending: SPECIALIST
Payer: MEDICARE

## 2021-11-05 ENCOUNTER — APPOINTMENT (OUTPATIENT)
Dept: RADIATION ONCOLOGY | Age: 66
End: 2021-11-05
Attending: SPECIALIST
Payer: MEDICARE

## 2021-11-05 PROCEDURE — 77014 PR CT GUIDANCE PLACEMENT RAD THERAPY FIELDS: CPT | Performed by: SPECIALIST

## 2021-11-05 PROCEDURE — 77386 HC NTSTY MODUL RAD TX DLVR CPLX: CPT | Performed by: SPECIALIST

## 2021-11-08 ENCOUNTER — APPOINTMENT (OUTPATIENT)
Dept: RADIATION ONCOLOGY | Age: 66
End: 2021-11-08
Attending: SPECIALIST
Payer: MEDICARE

## 2021-11-08 ENCOUNTER — HOSPITAL ENCOUNTER (OUTPATIENT)
Dept: RADIATION ONCOLOGY | Age: 66
Discharge: HOME | End: 2021-11-08
Attending: SPECIALIST
Payer: MEDICARE

## 2021-11-08 PROCEDURE — 77014 PR CT GUIDANCE PLACEMENT RAD THERAPY FIELDS: CPT | Performed by: SPECIALIST

## 2021-11-08 PROCEDURE — 77386 HC NTSTY MODUL RAD TX DLVR CPLX: CPT | Performed by: SPECIALIST

## 2021-11-09 ENCOUNTER — OFFICE VISIT (OUTPATIENT)
Dept: ONCOLOGY | Age: 66
End: 2021-11-09
Payer: MEDICARE

## 2021-11-09 ENCOUNTER — APPOINTMENT (OUTPATIENT)
Dept: RADIATION ONCOLOGY | Age: 66
End: 2021-11-09
Attending: SPECIALIST
Payer: MEDICARE

## 2021-11-09 ENCOUNTER — HOSPITAL ENCOUNTER (OUTPATIENT)
Dept: RADIATION ONCOLOGY | Age: 66
Discharge: HOME | End: 2021-11-09
Attending: SPECIALIST
Payer: MEDICARE

## 2021-11-09 ENCOUNTER — HOSPITAL ENCOUNTER (OUTPATIENT)
Dept: INFUSION THERAPY | Age: 66
Discharge: HOME | End: 2021-11-09
Payer: MEDICARE

## 2021-11-09 VITALS
RESPIRATION RATE: 16 BRPM | WEIGHT: 146.9 LBS | HEIGHT: 67 IN | SYSTOLIC BLOOD PRESSURE: 136 MMHG | HEART RATE: 89 BPM | OXYGEN SATURATION: 97 % | DIASTOLIC BLOOD PRESSURE: 69 MMHG | TEMPERATURE: 98.1 F | BODY MASS INDEX: 23.06 KG/M2

## 2021-11-09 DIAGNOSIS — C76.0 PRIMARY SQUAMOUS CELL CARCINOMA OF HEAD AND NECK (HCC): ICD-10-CM

## 2021-11-09 DIAGNOSIS — C76.0 PRIMARY SQUAMOUS CELL CARCINOMA OF HEAD AND NECK (HCC): Primary | ICD-10-CM

## 2021-11-09 LAB
ALBUMIN SERPL-MCNC: 4.4 G/DL (ref 3.5–5.2)
ALP BLD-CCNC: 87 U/L (ref 40–129)
ALT SERPL-CCNC: 34 U/L (ref 0–40)
ANION GAP SERPL CALCULATED.3IONS-SCNC: 13 MMOL/L (ref 7–16)
AST SERPL-CCNC: 27 U/L (ref 0–39)
BASOPHILS ABSOLUTE: 0.04 E9/L (ref 0–0.2)
BASOPHILS RELATIVE PERCENT: 0.9 % (ref 0–2)
BILIRUB SERPL-MCNC: 0.5 MG/DL (ref 0–1.2)
BUN BLDV-MCNC: 11 MG/DL (ref 6–23)
CALCIUM SERPL-MCNC: 10.5 MG/DL (ref 8.6–10.2)
CHLORIDE BLD-SCNC: 94 MMOL/L (ref 98–107)
CO2: 27 MMOL/L (ref 22–29)
CREAT SERPL-MCNC: 0.8 MG/DL (ref 0.7–1.2)
EOSINOPHILS ABSOLUTE: 0 E9/L (ref 0.05–0.5)
EOSINOPHILS RELATIVE PERCENT: 1.5 % (ref 0–6)
GFR AFRICAN AMERICAN: >60
GFR NON-AFRICAN AMERICAN: >60 ML/MIN/1.73
GLUCOSE BLD-MCNC: 92 MG/DL (ref 74–99)
HCT VFR BLD CALC: 35.9 % (ref 37–54)
HEMOGLOBIN: 12.2 G/DL (ref 12.5–16.5)
LYMPHOCYTES ABSOLUTE: 0.12 E9/L (ref 1.5–4)
LYMPHOCYTES RELATIVE PERCENT: 3.4 % (ref 20–42)
MAGNESIUM: 1.7 MG/DL (ref 1.6–2.6)
MCH RBC QN AUTO: 31.7 PG (ref 26–35)
MCHC RBC AUTO-ENTMCNC: 34 % (ref 32–34.5)
MCV RBC AUTO: 93.2 FL (ref 80–99.9)
MONOCYTES ABSOLUTE: 0.25 E9/L (ref 0.1–0.95)
MONOCYTES RELATIVE PERCENT: 6 % (ref 2–12)
NEUTROPHILS ABSOLUTE: 3.69 E9/L (ref 1.8–7.3)
NEUTROPHILS RELATIVE PERCENT: 89.7 % (ref 43–80)
PDW BLD-RTO: 13.1 FL (ref 11.5–15)
PLATELET # BLD: 73 E9/L (ref 130–450)
PLATELET CONFIRMATION: NORMAL
PMV BLD AUTO: 11 FL (ref 7–12)
POTASSIUM SERPL-SCNC: 4.4 MMOL/L (ref 3.5–5)
RBC # BLD: 3.85 E12/L (ref 3.8–5.8)
RBC # BLD: NORMAL 10*6/UL
SODIUM BLD-SCNC: 134 MMOL/L (ref 132–146)
TOTAL PROTEIN: 7.5 G/DL (ref 6.4–8.3)
WBC # BLD: 4.1 E9/L (ref 4.5–11.5)

## 2021-11-09 PROCEDURE — 99212 OFFICE O/P EST SF 10 MIN: CPT

## 2021-11-09 PROCEDURE — 77336 RADIATION PHYSICS CONSULT: CPT | Performed by: SPECIALIST

## 2021-11-09 PROCEDURE — 77014 PR CT GUIDANCE PLACEMENT RAD THERAPY FIELDS: CPT | Performed by: SPECIALIST

## 2021-11-09 PROCEDURE — 77386 HC NTSTY MODUL RAD TX DLVR CPLX: CPT | Performed by: SPECIALIST

## 2021-11-09 PROCEDURE — 80053 COMPREHEN METABOLIC PANEL: CPT

## 2021-11-09 PROCEDURE — 83735 ASSAY OF MAGNESIUM: CPT

## 2021-11-09 PROCEDURE — 36415 COLL VENOUS BLD VENIPUNCTURE: CPT

## 2021-11-09 PROCEDURE — 85025 COMPLETE CBC W/AUTO DIFF WBC: CPT

## 2021-11-09 PROCEDURE — 99214 OFFICE O/P EST MOD 30 MIN: CPT | Performed by: INTERNAL MEDICINE

## 2021-11-09 RX ORDER — SODIUM CHLORIDE 9 MG/ML
25 INJECTION, SOLUTION INTRAVENOUS PRN
Status: CANCELLED | OUTPATIENT
Start: 2021-11-10

## 2021-11-09 RX ORDER — METHYLPREDNISOLONE SODIUM SUCCINATE 125 MG/2ML
125 INJECTION, POWDER, LYOPHILIZED, FOR SOLUTION INTRAMUSCULAR; INTRAVENOUS ONCE
Status: CANCELLED | OUTPATIENT
Start: 2021-11-10 | End: 2021-11-09

## 2021-11-09 RX ORDER — HEPARIN SODIUM (PORCINE) LOCK FLUSH IV SOLN 100 UNIT/ML 100 UNIT/ML
500 SOLUTION INTRAVENOUS PRN
Status: CANCELLED | OUTPATIENT
Start: 2021-11-10

## 2021-11-09 RX ORDER — IBUPROFEN 600 MG/1
TABLET ORAL
COMMUNITY
Start: 2021-11-02

## 2021-11-09 RX ORDER — EPINEPHRINE 1 MG/ML
0.3 INJECTION, SOLUTION, CONCENTRATE INTRAVENOUS PRN
Status: CANCELLED | OUTPATIENT
Start: 2021-11-10

## 2021-11-09 RX ORDER — PALONOSETRON HYDROCHLORIDE 0.05 MG/ML
0.25 INJECTION, SOLUTION INTRAVENOUS ONCE
Status: CANCELLED | OUTPATIENT
Start: 2021-11-10

## 2021-11-09 RX ORDER — SODIUM CHLORIDE 0.9 % (FLUSH) 0.9 %
5-40 SYRINGE (ML) INJECTION PRN
Status: CANCELLED | OUTPATIENT
Start: 2021-11-10

## 2021-11-09 RX ORDER — DIPHENHYDRAMINE HYDROCHLORIDE 50 MG/ML
50 INJECTION INTRAMUSCULAR; INTRAVENOUS ONCE
Status: CANCELLED | OUTPATIENT
Start: 2021-11-10 | End: 2021-11-09

## 2021-11-09 RX ORDER — SODIUM CHLORIDE 9 MG/ML
20 INJECTION, SOLUTION INTRAVENOUS ONCE
Status: CANCELLED | OUTPATIENT
Start: 2021-11-10 | End: 2021-11-09

## 2021-11-09 RX ORDER — SODIUM CHLORIDE 9 MG/ML
INJECTION, SOLUTION INTRAVENOUS CONTINUOUS
Status: CANCELLED | OUTPATIENT
Start: 2021-11-10

## 2021-11-09 RX ORDER — DEXAMETHASONE SODIUM PHOSPHATE 10 MG/ML
10 INJECTION, SOLUTION INTRAMUSCULAR; INTRAVENOUS ONCE
Status: CANCELLED | OUTPATIENT
Start: 2021-11-10

## 2021-11-09 NOTE — PROGRESS NOTES
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 Referring Physician:  Toya Jaramillo DO    PCP:  Chris Bowie MD    History of Present Illness:      Mr. Hernan Hoyt is a pleasant 68-year-old gentleman, with a past medical history significant for tobacco use disorder, COPD, and hepatitis C status post treatment in 2019, who had presented with left oral tongue lesion, when he developed a left neck mass, he was treated with a short course of antibiotics, which did not help, he subsequently had a work-up done, including a CT of the neck which had revealed an abnormal mass in the lateral aspect of the oral tongue involving a significant portion of the tongue measuring 4 x 2 x 2.8 cm, a level 2A lymph node was also noted, The patient underwent a laryngoscopy on 7/19/2021 which revealed a benign-appearing polyp on the left true vocal cord but a mass involving the left tonsil fossa. This was biopsied and found to be a invasive squamous cell carcinoma p16-positive. PET/CT was done on 8/3/2021, revealing a masslike structure involving the left tongue extending to the posterior pharyngeal wall crossing the midline. There was also metabolic activity noted in a zone 3 node on the ipsilateral side. The patient started on 10/12/2021 on concurrent chemoradiation using weekly cisplatin. He denies any nausea or vomiting, he has mouth pain, the symptoms related to the cancer are improving. Review of Systems;  CONSTITUTIONAL: No fever, chills. Decreased appetite and energy level. Pos for weight loss. ENMT: Eyes: No diplopia; Nose: No epistaxis. Mouth: pos for mouth pain, odynophagia and dysphagia. Pos for hoarseness of the voice. RESPIRATORY: No hemoptysis, positive for mild chronic shortness of breath, cough.    CARDIOVASCULAR: No chest pain, palpitations. GASTROINTESTINAL: No nausea/vomiting, abdominal pain, diarrhea/constipation. GENITOURINARY: No dysuria, urinary frequency, hematuria. NEURO: No syncope, presyncope, headache. Remainder:  ROS NEGATIVE    Past Medical History:      Diagnosis Date    Cancer ([address]     Tongue    COPD (chronic obstructive pulmonary disease) ([address]     Edentulous     History of hepatitis C 2019    treated-2019    Hoarseness of voice     For OR 7-12-21    Mesothelioma ([address]     Mouth sores     Tongue mass     left     Patient Active Problem List   Diagnosis    COPD (chronic obstructive pulmonary disease) ([address]    Lumbar radiculopathy    Hepatitis C without hepatic coma    Tongue mass    Primary squamous cell carcinoma of head and neck ([address]        Past Surgical History:      Procedure Laterality Date    FRACTURE SURGERY      KNEE ARTHROSCOPY Left 1980    LARYNGOSCOPY N/A 7/12/2021    PANENDOSCOPY BRONCHOSCOPY ESOPHAGOSCOPY MICROSUSPENSION DIRECT LARYNGOSCOPY performed by Erin Mccallum DO [facility] N/A 7/12/2021    BIOPSY OF TONGUE BASE WITH FROZEN SECTION AND LEFT TRUE VOCAL CORD BIOPSY performed by Erin Mccallum DO at [facility] OR       Family History:  Family History   Problem Relation Age of Onset    Cancer Father         lung       Medications:  Reviewed and reconciled.     Social History:  Social History     Socioeconomic History    Marital status: Single     Spouse name: Not on file    Number of children: 0    Years of education: Not on file    Highest education level: Not on file   Occupational History    Occupation: dairy compressor   Tobacco Use    Smoking status: Current Every Day Smoker     Packs/day: 0.50     Years: 45.00     Pack years: 22.50     Types: Cigarettes    Smokeless tobacco: Never Used   Vaping Use    Vaping Use: Never used   Substance and Sexual Activity    Alcohol use: Not Currently    Drug use: Never    Sexual activity: Not on file   Other Topics Concern    Not on file   Social History Narrative    Not on file     Social Determinants of Health     Financial Resource Strain:     Difficulty of Paying Living Expenses: Not on file   Food Insecurity:     Worried About [address] in the Last Year: Not on file    Harsh of Food in the Last Year: Not on file   Transportation Needs:     Lack of Transportation (Medical): Not on file    Lack of Transportation (Non-Medical): Not on file   Physical Activity:     Days of Exercise per Week: Not on file    Minutes of Exercise per Session: Not on file   Stress:     Feeling of Stress : Not on file   Social Connections:     Frequency of Communication with Friends and Family: Not on file    Frequency of Social Gatherings with Friends and Family: Not on file    Attends Muslim Services: Not on file    Active Member of [address] [place] [address] or Organizations: Not on file    Attends Club or Organization Meetings: Not on file    Marital Status: Not on file   Intimate Partner Violence:     Fear of Current or Ex-Partner: Not on file    Emotionally Abused: Not on file    Physically Abused: Not on file    Sexually Abused: Not on file   Housing Stability:     Unable to Pay for Housing in the Last Year: Not on file    Number of [place] in the Last Year: Not on file    Unstable Housing in the Last Year: Not on file       Allergies:  No Known Allergies    Physical Exam:  /69   Pulse 89   Temp 98.1 °F (36.7 °C)   Resp 16   Ht 5' 7\" (1.702 m)   Wt 146 lb 14.4 oz (66.6 kg)   SpO2 97%   BMI 23.01 kg/m²     GENERAL: Alert, oriented x 3, not in acute distress. HEENT: PERRLA; EOMI. Positive for trismus, improved motion of the tongue. NECK: Supple. Positive for radiation dermatitis. He has fullness of the left neck. LUNGS: Good air entry bilaterally. No wheezing, crackles or rhonchi. CARDIOVASCULAR: Regular rate. No murmurs, rubs or gallops. ABDOMEN: Soft.  Non-tender, non-distended. Positive bowel sounds. EXTREMITIES: Without clubbing, cyanosis, or edema. NEUROLOGIC: No focal deficits. ECOG PS 1    Impression/Plan:       Mr. Annelise Nobles is a pleasant 51-year-old gentleman, with a past medical history significant for tobacco use disorder, COPD, and hepatitis C status post treatment in 2019, who had presented with left oral tongue lesion, when he developed a left neck mass, he was treated with a short course of antibiotics, which did not help, he subsequently had a work-up done, including a CT of the neck which had revealed an abnormal mass in the lateral aspect of the oral tongue involving a significant portion of the tongue measuring 4 x 2 x 2.8 cm, a level 2A lymph node was also noted, The patient underwent a laryngoscopy on 7/19/2021 which revealed a benign-appearing polyp on the left true vocal cord but a mass involving the left tonsil fossa. This was biopsied and found to be a invasive squamous cell carcinoma p16-positive. PET/CT was done on 8/3/2021, revealing a masslike structure involving the left tongue extending to the posterior pharyngeal wall crossing the midline. There was also metabolic activity noted in a zone 3 node on the ipsilateral side, clinical stage [id number] disease. I discussed with the patient his diagnosis, prognosis, as well as treatment with definitive concurrent chemo/RT using weekly cisplatin, the side effects and the schedule of the treatment were reviewed with the patient. The patient had decided against having a PEG and port placed, referral was placed to palliative medicine for symptoms management. The patient was started on 10/12/2021 on concurrent chemoradiation using weekly cisplatin.    He is tolerating the treatment well overall, today 11/9/2021, labs reviewed, electrolytes and creatinine are within normal range, he has mild leukopenia with normal ANC, platelet count is 73 K, but he has platelets clumping, will repeat his CBCD tomorrow in a citrated tube, if his platelet count is adequate for treatment, he will receive cisplatin, cycle #5. Continue Magic mouthwash. He will let me know if he has any new problems. RTC in 1 week. Thank you for allowing us to participate in the care of Mr. Dereje Johnson.     Sandra Tillman MD   HEMATOLOGY/MEDICAL ONCOLOGY  [address] [place] [address] ONCOLOGY  [facility] [place] [facility] [address] [facility] [address]  Dept: [phone]

## 2021-11-10 ENCOUNTER — HOSPITAL ENCOUNTER (OUTPATIENT)
Dept: RADIATION ONCOLOGY | Age: 66
Discharge: HOME | End: 2021-11-10
Attending: SPECIALIST
Payer: MEDICARE

## 2021-11-10 ENCOUNTER — HOSPITAL ENCOUNTER (OUTPATIENT)
Dept: INFUSION THERAPY | Age: 66
Discharge: HOME | End: 2021-11-10
Payer: MEDICARE

## 2021-11-10 ENCOUNTER — APPOINTMENT (OUTPATIENT)
Dept: RADIATION ONCOLOGY | Age: 66
End: 2021-11-10
Attending: SPECIALIST
Payer: MEDICARE

## 2021-11-10 VITALS
RESPIRATION RATE: 18 BRPM | WEIGHT: 149.5 LBS | BODY MASS INDEX: 23.42 KG/M2 | HEART RATE: 74 BPM | SYSTOLIC BLOOD PRESSURE: 120 MMHG | DIASTOLIC BLOOD PRESSURE: 84 MMHG | OXYGEN SATURATION: 96 % | TEMPERATURE: 97.8 F

## 2021-11-10 DIAGNOSIS — C76.0 PRIMARY SQUAMOUS CELL CARCINOMA OF HEAD AND NECK (HCC): Primary | ICD-10-CM

## 2021-11-10 DIAGNOSIS — C76.0 HEAD AND NECK CANCER (HCC): Primary | ICD-10-CM

## 2021-11-10 LAB
BASOPHILS ABSOLUTE: 0.01 E9/L (ref 0–0.2)
BASOPHILS RELATIVE PERCENT: 0.3 % (ref 0–2)
EOSINOPHILS ABSOLUTE: 0.04 E9/L (ref 0.05–0.5)
EOSINOPHILS RELATIVE PERCENT: 1.3 % (ref 0–6)
HCT VFR BLD CALC: 37 % (ref 37–54)
HEMOGLOBIN: 12.5 G/DL (ref 12.5–16.5)
IMMATURE GRANULOCYTES #: 0.01 E9/L
IMMATURE GRANULOCYTES %: 0.3 % (ref 0–5)
LYMPHOCYTES ABSOLUTE: 0.26 E9/L (ref 1.5–4)
LYMPHOCYTES RELATIVE PERCENT: 8.4 % (ref 20–42)
MCH RBC QN AUTO: 31.3 PG (ref 26–35)
MCHC RBC AUTO-ENTMCNC: 33.8 % (ref 32–34.5)
MCV RBC AUTO: 92.7 FL (ref 80–99.9)
MONOCYTES ABSOLUTE: 0.25 E9/L (ref 0.1–0.95)
MONOCYTES RELATIVE PERCENT: 8.1 % (ref 2–12)
NEUTROPHILS ABSOLUTE: 2.51 E9/L (ref 1.8–7.3)
NEUTROPHILS RELATIVE PERCENT: 81.6 % (ref 43–80)
PDW BLD-RTO: 12.8 FL (ref 11.5–15)
PLATELET # BLD: 66 E9/L (ref 130–450)
PLATELET CONFIRMATION: NORMAL
PMV BLD AUTO: 10.9 FL (ref 7–12)
RBC # BLD: 3.99 E12/L (ref 3.8–5.8)
RBC # BLD: NORMAL 10*6/UL
WBC # BLD: 3.1 E9/L (ref 4.5–11.5)

## 2021-11-10 PROCEDURE — 99999 PR OFFICE/OUTPT VISIT,PROCEDURE ONLY: CPT | Performed by: RADIOLOGY

## 2021-11-10 PROCEDURE — 36415 COLL VENOUS BLD VENIPUNCTURE: CPT

## 2021-11-10 PROCEDURE — 77386 HC NTSTY MODUL RAD TX DLVR CPLX: CPT | Performed by: SPECIALIST

## 2021-11-10 PROCEDURE — 77014 PR CT GUIDANCE PLACEMENT RAD THERAPY FIELDS: CPT | Performed by: SPECIALIST

## 2021-11-10 PROCEDURE — 85025 COMPLETE CBC W/AUTO DIFF WBC: CPT

## 2021-11-10 RX ORDER — SODIUM CHLORIDE 0.9 % (FLUSH) 0.9 %
5-40 SYRINGE (ML) INJECTION PRN
Status: DISCONTINUED | OUTPATIENT
Start: 2021-11-10 | End: 2021-11-11 | Stop reason: HOSPADM

## 2021-11-10 ASSESSMENT — PAIN SCALES - GENERAL: PAINLEVEL_OUTOF10: 10

## 2021-11-10 ASSESSMENT — PAIN DESCRIPTION - LOCATION: LOCATION: NECK

## 2021-11-10 ASSESSMENT — PAIN DESCRIPTION - DESCRIPTORS: DESCRIPTORS: BURNING

## 2021-11-10 NOTE — PATIENT INSTRUCTIONS
Continue daily fractionated radiation therapy as scheduled. Please see weekly OTV note and intial consultation letter in [facility] for clinical details. Delaney Dodson. Jackie Lockhart MD MS [place] [facility]:  [phone]   FAX: [phone]  [facility]:  [phone]   FAX:    [phone]  [address] [place] [address] Road:  [phone]   FAX:  [phone]  Email: [email or web address]

## 2021-11-10 NOTE — PROGRESS NOTES
Mirza Andre  11/10/2021  Wt Readings from Last 3 Encounters:   11/10/21 149 lb 8 oz (67.8 kg)   11/09/21 146 lb 14.4 oz (66.6 kg)   11/02/21 146 lb (66.2 kg)     Body mass index is 23.42 kg/m². Treatment Area:PTV 6920    Patient was seen today for weekly visit. Comfort Alteration  KPS:80%  Fatigue: Mild    Mucous Membrane Alteration  Mucositis Due to Radiation: No  Thrush: No  Voice Changes: Yes    Nutritional Alteration  Anorexia: No   Nausea: No   Vomiting: No     Skin Alteration   Sensation:yes, red and skin broke open    Radiation Dermatitis:  Yes see above    Ventilation Alteration  Cough:No    Emotional  Coping: somewhat effective    Injury, potential bleeding or infection: no    Radioprotectant Tolerance  yes            Lab Results   Component Value Date    WBC 4.1 (L) 11/09/2021    PLT 73 (L) 11/09/2021         /84   Pulse 74   Temp 97.8 °F (36.6 °C) (Temporal)   Resp 18   Wt 149 lb 8 oz (67.8 kg)   SpO2 96%   BMI 23.42 kg/m²   BP within normal range? yes       Assessment/Plan: Pt completed 21/30fx and 4200/7400cGy. Pt is tolerating tx well thus far.      Drake Patiño RN

## 2021-11-10 NOTE — PROGRESS NOTES
DEPARTMENT OF RADIATION ONCOLOGY ON TREATMENT VISIT         11/10/2021      NAME:  Jemima Andre    YOB: 1955    Diagnosis: HN CA    SUBJECTIVE:   Elyssa Diaz has now received fractionated external beam radiation therapy - ongoing. Past medical, surgical, social and family histories reviewed and updated as indicated. Pain: controlled    ALLERGIES:  Patient has no known allergies. Current Outpatient Medications   Medication Sig Dispense Refill    ibuprofen (ADVIL;MOTRIN) 600 MG tablet       ondansetron (ZOFRAN) 8 MG tablet Take 1 tablet by mouth every 12 hours as needed for Nausea or Vomiting 30 tablet 1    prochlorperazine (COMPAZINE) 10 MG tablet Take 1 tablet by mouth every 6 hours as needed (nausea, vomiting) 30 tablet 2    oxyCODONE-acetaminophen (PERCOCET) 7.5-325 MG per tablet Take 1 tablet by mouth every 8 hours as needed.  diazePAM (VALIUM) 5 MG tablet Take 5 mg by mouth 2 times daily.  albuterol sulfate  (90 Base) MCG/ACT inhaler INHALE 2 PUFFS INTO THE LUNGS EVERY 6 HOURS AS NEEDED FOR WHEEZING 54 g 3    ADVAIR DISKUS 250-50 MCG/DOSE AEPB Inhale 1 puff into the lungs 2 times daily        No current facility-administered medications for this encounter. OBJECTIVE:  Alert and fully ambulatory. Pleasant and conversant. Physical Examination: General appearance - alert, well appearing, and in no distress. Wt Readings from Last 3 Encounters:   11/10/21 149 lb 8 oz (67.8 kg)   11/09/21 146 lb 14.4 oz (66.6 kg)   11/02/21 146 lb (66.2 kg)         ASSESSMENT/PLAN:     Patient is tolerating treatments well with expected toxicities. RBA were reviewed prior to first fraction and PRN. Current and planned dose reviewed. Goals of treatment and potential side effects were reviewed with the patient PRN. Treatment imaging has been personally reviewed for accuracy and precision.     Questions answered to apparent satisfaction. Treatments will continue as planned. Elieser Michelle.  Ale Jensen MD MS DABR  Radiation Oncologist        [facility] ([address] [place] [address]): [phone] /// FAX: [phone]  [facility]): [phone] /// FAX: [phone]  [address] [place] [address]): [phone] /// FAX: [phone]

## 2021-11-10 NOTE — PROGRESS NOTES
Karina Roldan Jes  11/10/2021  Wt Readings from Last 10 Encounters:   11/10/21 149 lb 8 oz (67.8 kg)   11/09/21 146 lb 14.4 oz (66.6 kg)   11/02/21 146 lb (66.2 kg)   11/02/21 146 lb 9.6 oz (66.5 kg)   10/26/21 145 lb (65.8 kg)   10/26/21 146 lb (66.2 kg)   10/21/21 154 lb 11.2 oz (70.2 kg)   10/20/21 146 lb (66.2 kg)   10/19/21 149 lb 9.6 oz (67.9 kg)   10/14/21 158 lb 11.2 oz (72 kg)     Ht Readings from Last 1 Encounters:   11/09/21 5' 7\" (1.702 m)     There is no height or weight on file to calculate BMI. Met with patient today during chemotherapy, here for cycle #4. Pt is doing surprisingly well, he continues to refuse PEG and port, but has improved his PO intake with fortifying ingredients to meet his calorie and protein needs. This clinician spent considerable time with patient reviewing recommendations for blending food items, and a recipe book was provided, along with more samples of Ensure. He was encouraged to continue his regimen and the goal will be to meet nutrition needs without PEG. Weight change:30# loss in 1mo (17%), 45# loss in 6mo (23%)  Appetite: poor  N/V/D/C: none noted; dysphagia/odynophagia  Calculated Needs if applicable: 8424-3902NOZS (68x30-32), 88-95gm PRO (68x1.3), 2200ml (68x32)     Pre-Hab Eligible?: yes        Recommendations: No PEG. Continue PO intake of Boost VHC 3 times daily providing 1590kcal, 66gm PRO; Additional puree/blended meals to meet remaining needs.             ASPEN GUIDELINES FOR CLINICAL CHARACTERISTICS OF MALNUTRITION IN CHRONIC ILLNESS     Moderate Malnutrition  Severe Malnutrition    Energy intake  <75% energy intake compared to estimated needs for >1month <75% energy intake compared to estimated needs for >1month   Weight changes  5% x 1 month  7.5% x 3 months   10% x 6 months   20% x 1 year  >5% x 1 month  >7.5% x 3 months   >10% x 6 months   >20% x 1 year    Physical findings  Mild   Decrease subcutaneous fat    Decrease muscle mass     Increase fluid/edema   Severe  Decrease subcutaneous fat    Decrease muscle mass     Increase fluid/edema      Severe malnutrition evidenced by 25% weight loss in 3mo, <50% intake x3mo.     Jessica Lopez RD, LD

## 2021-11-10 NOTE — PROGRESS NOTES
Per Dr. Jhony Vega hold chemotherapy today due to platelet count reschedule in 1 week for labs office visit and possible chemotherapy treatment

## 2021-11-11 ENCOUNTER — APPOINTMENT (OUTPATIENT)
Dept: RADIATION ONCOLOGY | Age: 66
End: 2021-11-11
Attending: SPECIALIST
Payer: MEDICARE

## 2021-11-11 ENCOUNTER — HOSPITAL ENCOUNTER (OUTPATIENT)
Dept: RADIATION ONCOLOGY | Age: 66
Discharge: HOME | End: 2021-11-11
Attending: SPECIALIST
Payer: MEDICARE

## 2021-11-11 PROCEDURE — 77386 HC NTSTY MODUL RAD TX DLVR CPLX: CPT | Performed by: SPECIALIST

## 2021-11-11 PROCEDURE — 77014 PR CT GUIDANCE PLACEMENT RAD THERAPY FIELDS: CPT | Performed by: SPECIALIST

## 2021-11-12 ENCOUNTER — APPOINTMENT (OUTPATIENT)
Dept: RADIATION ONCOLOGY | Age: 66
End: 2021-11-12
Attending: SPECIALIST
Payer: MEDICARE

## 2021-11-12 ENCOUNTER — TELEPHONE (OUTPATIENT)
Dept: PHARMACY | Age: 66
End: 2021-11-12

## 2021-11-12 ENCOUNTER — HOSPITAL ENCOUNTER (OUTPATIENT)
Dept: RADIATION ONCOLOGY | Age: 66
Discharge: HOME | End: 2021-11-12
Attending: SPECIALIST
Payer: MEDICARE

## 2021-11-12 PROCEDURE — 77386 HC NTSTY MODUL RAD TX DLVR CPLX: CPT | Performed by: SPECIALIST

## 2021-11-12 PROCEDURE — 77014 PR CT GUIDANCE PLACEMENT RAD THERAPY FIELDS: CPT | Performed by: SPECIALIST

## 2021-11-12 NOTE — TELEPHONE ENCOUNTER
Received a referral from [facility] to find clarice/foundation to assist with out of pocket expenses.  Unfortunately at this time there are no clarice/foundations to assist.

## 2021-11-15 ENCOUNTER — APPOINTMENT (OUTPATIENT)
Dept: RADIATION ONCOLOGY | Age: 66
End: 2021-11-15
Attending: SPECIALIST
Payer: MEDICARE

## 2021-11-15 ENCOUNTER — HOSPITAL ENCOUNTER (OUTPATIENT)
Dept: RADIATION ONCOLOGY | Age: 66
Discharge: HOME | End: 2021-11-15
Attending: SPECIALIST
Payer: MEDICARE

## 2021-11-15 PROCEDURE — 77014 PR CT GUIDANCE PLACEMENT RAD THERAPY FIELDS: CPT | Performed by: SPECIALIST

## 2021-11-15 PROCEDURE — 77386 HC NTSTY MODUL RAD TX DLVR CPLX: CPT | Performed by: SPECIALIST

## 2021-11-16 ENCOUNTER — HOSPITAL ENCOUNTER (OUTPATIENT)
Dept: RADIATION ONCOLOGY | Age: 66
Discharge: HOME | End: 2021-11-16
Attending: SPECIALIST
Payer: MEDICARE

## 2021-11-16 ENCOUNTER — APPOINTMENT (OUTPATIENT)
Dept: RADIATION ONCOLOGY | Age: 66
End: 2021-11-16
Attending: SPECIALIST
Payer: MEDICARE

## 2021-11-16 ENCOUNTER — HOSPITAL ENCOUNTER (OUTPATIENT)
Dept: INFUSION THERAPY | Age: 66
Discharge: HOME | End: 2021-11-16
Payer: MEDICARE

## 2021-11-16 ENCOUNTER — OFFICE VISIT (OUTPATIENT)
Dept: ONCOLOGY | Age: 66
End: 2021-11-16
Payer: MEDICARE

## 2021-11-16 VITALS
WEIGHT: 147.8 LBS | OXYGEN SATURATION: 97 % | BODY MASS INDEX: 23.2 KG/M2 | SYSTOLIC BLOOD PRESSURE: 143 MMHG | DIASTOLIC BLOOD PRESSURE: 62 MMHG | TEMPERATURE: 97.2 F | HEIGHT: 67 IN | HEART RATE: 53 BPM

## 2021-11-16 DIAGNOSIS — C76.0 PRIMARY SQUAMOUS CELL CARCINOMA OF HEAD AND NECK (HCC): ICD-10-CM

## 2021-11-16 DIAGNOSIS — C76.0 PRIMARY SQUAMOUS CELL CARCINOMA OF HEAD AND NECK (HCC): Primary | ICD-10-CM

## 2021-11-16 LAB
ALBUMIN SERPL-MCNC: 4.2 G/DL (ref 3.5–5.2)
ALP BLD-CCNC: 81 U/L (ref 40–129)
ALT SERPL-CCNC: 9 U/L (ref 0–40)
ANION GAP SERPL CALCULATED.3IONS-SCNC: 8 MMOL/L (ref 7–16)
AST SERPL-CCNC: 12 U/L (ref 0–39)
BASOPHILS ABSOLUTE: 0 E9/L (ref 0–0.2)
BASOPHILS RELATIVE PERCENT: 0 % (ref 0–2)
BILIRUB SERPL-MCNC: 0.3 MG/DL (ref 0–1.2)
BUN BLDV-MCNC: 8 MG/DL (ref 6–23)
CALCIUM SERPL-MCNC: 10 MG/DL (ref 8.6–10.2)
CHLORIDE BLD-SCNC: 98 MMOL/L (ref 98–107)
CO2: 28 MMOL/L (ref 22–29)
CREAT SERPL-MCNC: 0.8 MG/DL (ref 0.7–1.2)
EOSINOPHILS ABSOLUTE: 0.05 E9/L (ref 0.05–0.5)
EOSINOPHILS RELATIVE PERCENT: 2.1 % (ref 0–6)
GFR AFRICAN AMERICAN: >60
GFR NON-AFRICAN AMERICAN: >60 ML/MIN/1.73
GLUCOSE BLD-MCNC: 100 MG/DL (ref 74–99)
HCT VFR BLD CALC: 34.4 % (ref 37–54)
HEMOGLOBIN: 11.7 G/DL (ref 12.5–16.5)
IMMATURE GRANULOCYTES #: 0.01 E9/L
IMMATURE GRANULOCYTES %: 0.4 % (ref 0–5)
LYMPHOCYTES ABSOLUTE: 0.19 E9/L (ref 1.5–4)
LYMPHOCYTES RELATIVE PERCENT: 8.1 % (ref 20–42)
MAGNESIUM: 1.9 MG/DL (ref 1.6–2.6)
MCH RBC QN AUTO: 31.1 PG (ref 26–35)
MCHC RBC AUTO-ENTMCNC: 34 % (ref 32–34.5)
MCV RBC AUTO: 91.5 FL (ref 80–99.9)
MONOCYTES ABSOLUTE: 0.15 E9/L (ref 0.1–0.95)
MONOCYTES RELATIVE PERCENT: 6.4 % (ref 2–12)
NEUTROPHILS ABSOLUTE: 1.96 E9/L (ref 1.8–7.3)
NEUTROPHILS RELATIVE PERCENT: 83 % (ref 43–80)
PDW BLD-RTO: 13.2 FL (ref 11.5–15)
PLATELET # BLD: 104 E9/L (ref 130–450)
PMV BLD AUTO: 10.4 FL (ref 7–12)
POTASSIUM SERPL-SCNC: 4.6 MMOL/L (ref 3.5–5)
RBC # BLD: 3.76 E12/L (ref 3.8–5.8)
RBC # BLD: NORMAL 10*6/UL
SODIUM BLD-SCNC: 134 MMOL/L (ref 132–146)
TOTAL PROTEIN: 7.4 G/DL (ref 6.4–8.3)
WBC # BLD: 2.4 E9/L (ref 4.5–11.5)

## 2021-11-16 PROCEDURE — 80053 COMPREHEN METABOLIC PANEL: CPT

## 2021-11-16 PROCEDURE — 83735 ASSAY OF MAGNESIUM: CPT

## 2021-11-16 PROCEDURE — 99212 OFFICE O/P EST SF 10 MIN: CPT

## 2021-11-16 PROCEDURE — 77014 PR CT GUIDANCE PLACEMENT RAD THERAPY FIELDS: CPT | Performed by: SPECIALIST

## 2021-11-16 PROCEDURE — 77336 RADIATION PHYSICS CONSULT: CPT | Performed by: SPECIALIST

## 2021-11-16 PROCEDURE — 85025 COMPLETE CBC W/AUTO DIFF WBC: CPT

## 2021-11-16 PROCEDURE — 77386 HC NTSTY MODUL RAD TX DLVR CPLX: CPT | Performed by: SPECIALIST

## 2021-11-16 PROCEDURE — 77427 RADIATION TX MANAGEMENT X5: CPT | Performed by: RADIOLOGY

## 2021-11-16 PROCEDURE — 36415 COLL VENOUS BLD VENIPUNCTURE: CPT

## 2021-11-16 PROCEDURE — 99214 OFFICE O/P EST MOD 30 MIN: CPT | Performed by: INTERNAL MEDICINE

## 2021-11-16 NOTE — PROGRESS NOTES
pain, palpitations. GASTROINTESTINAL: No nausea/vomiting, abdominal pain, diarrhea/constipation. GENITOURINARY: No dysuria, urinary frequency, hematuria. NEURO: No syncope, presyncope, headache. Remainder:  ROS NEGATIVE    Past Medical History:      Diagnosis Date    Cancer ([address]     Tongue    COPD (chronic obstructive pulmonary disease) ([address]     Edentulous     History of hepatitis C 2019    treated-2019    Hoarseness of voice     For OR 7-12-21    Mesothelioma ([address]     Mouth sores     Tongue mass     left     Patient Active Problem List   Diagnosis    COPD (chronic obstructive pulmonary disease) ([address]    Lumbar radiculopathy    Hepatitis C without hepatic coma    Tongue mass    Primary squamous cell carcinoma of head and neck ([address]        Past Surgical History:      Procedure Laterality Date    FRACTURE SURGERY      KNEE ARTHROSCOPY Left 1980    LARYNGOSCOPY N/A 7/12/2021    PANENDOSCOPY BRONCHOSCOPY ESOPHAGOSCOPY MICROSUSPENSION DIRECT LARYNGOSCOPY performed by Clarice Tirado DO at [facility] N/A 7/12/2021    BIOPSY OF TONGUE BASE WITH FROZEN SECTION AND LEFT TRUE VOCAL CORD BIOPSY performed by Clarice Tirado DO at [facility] OR       Family History:  Family History   Problem Relation Age of Onset    Cancer Father         lung       Medications:  Reviewed and reconciled.     Social History:  Social History     Socioeconomic History    Marital status: Single     Spouse name: Not on file    Number of children: 0    Years of education: Not on file    Highest education level: Not on file   Occupational History    Occupation: dairy compressor   Tobacco Use    Smoking status: Current Every Day Smoker     Packs/day: 0.50     Years: 45.00     Pack years: 22.50     Types: Cigarettes    Smokeless tobacco: Never Used   Vaping Use    Vaping Use: Never used   Substance and Sexual Activity    Alcohol use: Not Currently    Drug use: Never    Sexual activity: Not on file   Other Topics Concern    Not on file   Social History Narrative    Not on file     Social Determinants of Health     Financial Resource Strain:     Difficulty of Paying Living Expenses: Not on file   Food Insecurity:     Worried About [address] in the Last Year: Not on file    Harsh of Food in the Last Year: Not on file   Transportation Needs:     Lack of Transportation (Medical): Not on file    Lack of Transportation (Non-Medical): Not on file   Physical Activity:     Days of Exercise per Week: Not on file    Minutes of Exercise per Session: Not on file   Stress:     Feeling of Stress : Not on file   Social Connections:     Frequency of Communication with Friends and Family: Not on file    Frequency of Social Gatherings with Friends and Family: Not on file    Attends Holiness Services: Not on file    Active Member of [address] [place] [address] or Organizations: Not on file    Attends Club or Organization Meetings: Not on file    Marital Status: Not on file   Intimate Partner Violence:     Fear of Current or Ex-Partner: Not on file    Emotionally Abused: Not on file    Physically Abused: Not on file    Sexually Abused: Not on file   Housing Stability:     Unable to Pay for Housing in the Last Year: Not on file    Number of [place] in the Last Year: Not on file    Unstable Housing in the Last Year: Not on file       Allergies:  No Known Allergies    Physical Exam:  BP (!) 143/62   Pulse 53   Temp 97.2 °F (36.2 °C)   Ht 5' 7\" (1.702 m)   Wt 147 lb 12.8 oz (67 kg)   SpO2 97%   BMI 23.15 kg/m²     GENERAL: Alert, oriented x 3, not in acute distress. HEENT: PERRLA; EOMI. Positive for trismus, improved motion of the tongue. NECK: Supple. Positive for radiation dermatitis. He has fullness of the left neck. LUNGS: Good air entry bilaterally. No wheezing, crackles or rhonchi. CARDIOVASCULAR: Regular rate. No murmurs, rubs or gallops. ABDOMEN: Soft. Non-tender, non-distended.  Positive bowel sounds. EXTREMITIES: Without clubbing, cyanosis, or edema. NEUROLOGIC: No focal deficits. ECOG PS 1    Impression/Plan:       Mr. Miladis Connors is a pleasant 71-year-old gentleman, with a past medical history significant for tobacco use disorder, COPD, and hepatitis C status post treatment in 2019, who had presented with left oral tongue lesion, when he developed a left neck mass, he was treated with a short course of antibiotics, which did not help, he subsequently had a work-up done, including a CT of the neck which had revealed an abnormal mass in the lateral aspect of the oral tongue involving a significant portion of the tongue measuring 4 x 2 x 2.8 cm, a level 2A lymph node was also noted, The patient underwent a laryngoscopy on 7/19/2021 which revealed a benign-appearing polyp on the left true vocal cord but a mass involving the left tonsil fossa. This was biopsied and found to be a invasive squamous cell carcinoma p16-positive. PET/CT was done on 8/3/2021, revealing a masslike structure involving the left tongue extending to the posterior pharyngeal wall crossing the midline. There was also metabolic activity noted in a zone 3 node on the ipsilateral side, clinical stage [id number] disease. I discussed with the patient his diagnosis, prognosis, as well as treatment with definitive concurrent chemo/RT using weekly cisplatin, the side effects and the schedule of the treatment were reviewed with the patient. The patient had decided against having a PEG and port placed, referral was placed to palliative medicine for symptoms management. The patient was started on 10/12/2021 on concurrent chemoradiation using weekly cisplatin. Chemotherapy was held last week due to cytopenias, today 11/16/2021 the patient is scheduled for chemotherapy with cisplatin, cycle #5. Labs reviewed, blood counts adequate for treatment, the pain is doing well clinically, proceed with chemo. Continue Magic mouthwash.   He will let me know if he has any new problems. RTC in 1 week. Thank you for allowing us to participate in the care of Mr. Shelbie Denise.     Dayne Deutcsh MD   HEMATOLOGY/MEDICAL ONCOLOGY  [address] [place] [address] ONCOLOGY  [facility] [address] [facility] [address]  Dept: [phone]

## 2021-11-17 ENCOUNTER — HOSPITAL ENCOUNTER (OUTPATIENT)
Dept: RADIATION ONCOLOGY | Age: 66
Discharge: HOME | End: 2021-11-17
Attending: SPECIALIST
Payer: MEDICARE

## 2021-11-17 ENCOUNTER — APPOINTMENT (OUTPATIENT)
Dept: RADIATION ONCOLOGY | Age: 66
End: 2021-11-17
Attending: SPECIALIST
Payer: MEDICARE

## 2021-11-17 ENCOUNTER — HOSPITAL ENCOUNTER (OUTPATIENT)
Dept: INFUSION THERAPY | Age: 66
Discharge: HOME | End: 2021-11-17
Payer: MEDICARE

## 2021-11-17 VITALS
HEART RATE: 64 BPM | OXYGEN SATURATION: 97 % | SYSTOLIC BLOOD PRESSURE: 123 MMHG | DIASTOLIC BLOOD PRESSURE: 60 MMHG | TEMPERATURE: 97 F | RESPIRATION RATE: 16 BRPM

## 2021-11-17 DIAGNOSIS — C76.0 HEAD AND NECK CANCER (HCC): Primary | ICD-10-CM

## 2021-11-17 DIAGNOSIS — C76.0 PRIMARY SQUAMOUS CELL CARCINOMA OF HEAD AND NECK (HCC): Primary | ICD-10-CM

## 2021-11-17 PROCEDURE — 96415 CHEMO IV INFUSION ADDL HR: CPT

## 2021-11-17 PROCEDURE — 96367 TX/PROPH/DG ADDL SEQ IV INF: CPT

## 2021-11-17 PROCEDURE — 6360000002 HC RX W HCPCS: Performed by: INTERNAL MEDICINE

## 2021-11-17 PROCEDURE — 2580000003 HC RX 258: Performed by: INTERNAL MEDICINE

## 2021-11-17 PROCEDURE — 99999 PR OFFICE/OUTPT VISIT,PROCEDURE ONLY: CPT | Performed by: RADIOLOGY

## 2021-11-17 PROCEDURE — 77386 HC NTSTY MODUL RAD TX DLVR CPLX: CPT | Performed by: SPECIALIST

## 2021-11-17 PROCEDURE — 77014 PR CT GUIDANCE PLACEMENT RAD THERAPY FIELDS: CPT | Performed by: SPECIALIST

## 2021-11-17 PROCEDURE — 96413 CHEMO IV INFUSION 1 HR: CPT

## 2021-11-17 PROCEDURE — 96375 TX/PRO/DX INJ NEW DRUG ADDON: CPT

## 2021-11-17 RX ORDER — PALONOSETRON HYDROCHLORIDE 0.05 MG/ML
0.25 INJECTION, SOLUTION INTRAVENOUS ONCE
Status: CANCELLED | OUTPATIENT
Start: 2021-11-17

## 2021-11-17 RX ORDER — DEXAMETHASONE SODIUM PHOSPHATE 10 MG/ML
10 INJECTION, SOLUTION INTRAMUSCULAR; INTRAVENOUS ONCE
Status: COMPLETED | OUTPATIENT
Start: 2021-11-17 | End: 2021-11-17

## 2021-11-17 RX ORDER — DEXAMETHASONE SODIUM PHOSPHATE 10 MG/ML
10 INJECTION, SOLUTION INTRAMUSCULAR; INTRAVENOUS ONCE
Status: CANCELLED | OUTPATIENT
Start: 2021-11-17

## 2021-11-17 RX ORDER — HEPARIN SODIUM (PORCINE) LOCK FLUSH IV SOLN 100 UNIT/ML 100 UNIT/ML
500 SOLUTION INTRAVENOUS PRN
Status: CANCELLED | OUTPATIENT
Start: 2021-11-17

## 2021-11-17 RX ORDER — SODIUM CHLORIDE 9 MG/ML
20 INJECTION, SOLUTION INTRAVENOUS ONCE
Status: CANCELLED | OUTPATIENT
Start: 2021-11-17 | End: 2021-11-17

## 2021-11-17 RX ORDER — SODIUM CHLORIDE 0.9 % (FLUSH) 0.9 %
5-40 SYRINGE (ML) INJECTION PRN
Status: CANCELLED | OUTPATIENT
Start: 2021-11-17

## 2021-11-17 RX ORDER — SODIUM CHLORIDE 9 MG/ML
20 INJECTION, SOLUTION INTRAVENOUS ONCE
Status: DISCONTINUED | OUTPATIENT
Start: 2021-11-17 | End: 2021-11-18 | Stop reason: HOSPADM

## 2021-11-17 RX ORDER — EPINEPHRINE 1 MG/ML
0.3 INJECTION, SOLUTION, CONCENTRATE INTRAVENOUS PRN
Status: CANCELLED | OUTPATIENT
Start: 2021-11-17

## 2021-11-17 RX ORDER — SODIUM CHLORIDE 9 MG/ML
INJECTION, SOLUTION INTRAVENOUS CONTINUOUS
Status: CANCELLED | OUTPATIENT
Start: 2021-11-17

## 2021-11-17 RX ORDER — DIPHENHYDRAMINE HYDROCHLORIDE 50 MG/ML
50 INJECTION INTRAMUSCULAR; INTRAVENOUS ONCE
Status: CANCELLED | OUTPATIENT
Start: 2021-11-17 | End: 2021-11-17

## 2021-11-17 RX ORDER — METHYLPREDNISOLONE SODIUM SUCCINATE 125 MG/2ML
125 INJECTION, POWDER, LYOPHILIZED, FOR SOLUTION INTRAMUSCULAR; INTRAVENOUS ONCE
Status: CANCELLED | OUTPATIENT
Start: 2021-11-17 | End: 2021-11-17

## 2021-11-17 RX ORDER — PALONOSETRON HYDROCHLORIDE 0.05 MG/ML
0.25 INJECTION, SOLUTION INTRAVENOUS ONCE
Status: COMPLETED | OUTPATIENT
Start: 2021-11-17 | End: 2021-11-17

## 2021-11-17 RX ORDER — SODIUM CHLORIDE 0.9 % (FLUSH) 0.9 %
5-40 SYRINGE (ML) INJECTION PRN
Status: DISCONTINUED | OUTPATIENT
Start: 2021-11-17 | End: 2021-11-18 | Stop reason: HOSPADM

## 2021-11-17 RX ORDER — SODIUM CHLORIDE 9 MG/ML
25 INJECTION, SOLUTION INTRAVENOUS PRN
Status: CANCELLED | OUTPATIENT
Start: 2021-11-17

## 2021-11-17 RX ADMIN — FOSAPREPITANT 150 MG: 150 INJECTION, POWDER, LYOPHILIZED, FOR SOLUTION INTRAVENOUS at 09:34

## 2021-11-17 RX ADMIN — DEXAMETHASONE SODIUM PHOSPHATE 10 MG: 10 INJECTION, SOLUTION INTRAMUSCULAR; INTRAVENOUS at 09:29

## 2021-11-17 RX ADMIN — SODIUM CHLORIDE, PRESERVATIVE FREE 10 ML: 5 INJECTION INTRAVENOUS at 09:18

## 2021-11-17 RX ADMIN — SODIUM CHLORIDE 20 ML/HR: 9 INJECTION, SOLUTION INTRAVENOUS at 09:17

## 2021-11-17 RX ADMIN — POTASSIUM CHLORIDE: 2 INJECTION, SOLUTION, CONCENTRATE INTRAVENOUS at 10:23

## 2021-11-17 RX ADMIN — SODIUM CHLORIDE, PRESERVATIVE FREE 10 ML: 5 INJECTION INTRAVENOUS at 09:30

## 2021-11-17 RX ADMIN — PALONOSETRON 0.25 MG: 0.25 INJECTION, SOLUTION INTRAVENOUS at 09:25

## 2021-11-17 NOTE — PROGRESS NOTES
Radiation Oncology          -chart reviewed  -IG reviewed  -cont RT        Chan Sep.  Ngoc Hammer MD [place] [address] Oncology  Cell: [phone]    [facility]:  [phone]   FAX: [phone] [address] [facility]:  [address] [place] [address] Avenue:    [phone]  [facility]:  [address] [place] [address] Road:  [phone]

## 2021-11-17 NOTE — PATIENT INSTRUCTIONS
Continue daily fractionated radiation therapy as scheduled. Please see weekly OTV note and intial consultation letter in [facility] for clinical details. Meseret Schneider. Rasheed Slater MD MS [place]:  [phone]   FAX: [phone]  [facility]:  [phone]   FAX:    [phone]  [address] [place] [address] Road:  [phone]   FAX:  [phone]  Email: [email or web address]

## 2021-11-18 ENCOUNTER — OFFICE VISIT (OUTPATIENT)
Dept: ENT CLINIC | Age: 66
End: 2021-11-18
Payer: MEDICARE

## 2021-11-18 ENCOUNTER — APPOINTMENT (OUTPATIENT)
Dept: RADIATION ONCOLOGY | Age: 66
End: 2021-11-18
Attending: SPECIALIST
Payer: MEDICARE

## 2021-11-18 ENCOUNTER — HOSPITAL ENCOUNTER (OUTPATIENT)
Dept: RADIATION ONCOLOGY | Age: 66
Discharge: HOME | End: 2021-11-18
Attending: SPECIALIST
Payer: MEDICARE

## 2021-11-18 VITALS
BODY MASS INDEX: 23.17 KG/M2 | SYSTOLIC BLOOD PRESSURE: 139 MMHG | HEIGHT: 67 IN | TEMPERATURE: 97.1 F | OXYGEN SATURATION: 99 % | DIASTOLIC BLOOD PRESSURE: 78 MMHG | WEIGHT: 147.6 LBS | HEART RATE: 70 BPM

## 2021-11-18 DIAGNOSIS — G89.3 PAIN DUE TO NEOPLASM: ICD-10-CM

## 2021-11-18 DIAGNOSIS — C02.9 TONGUE CANCER (HCC): Primary | ICD-10-CM

## 2021-11-18 PROCEDURE — 99213 OFFICE O/P EST LOW 20 MIN: CPT | Performed by: OTOLARYNGOLOGY

## 2021-11-18 PROCEDURE — 77014 PR CT GUIDANCE PLACEMENT RAD THERAPY FIELDS: CPT | Performed by: SPECIALIST

## 2021-11-18 PROCEDURE — 77386 HC NTSTY MODUL RAD TX DLVR CPLX: CPT | Performed by: SPECIALIST

## 2021-11-18 ASSESSMENT — ENCOUNTER SYMPTOMS
SORE THROAT: 1
CHEST TIGHTNESS: 0
DIARRHEA: 0
EYE PAIN: 0
EYES NEGATIVE: 1
RESPIRATORY NEGATIVE: 1
SHORTNESS OF BREATH: 0
ABDOMINAL PAIN: 0
GASTROINTESTINAL NEGATIVE: 1
APNEA: 0
COLOR CHANGE: 0
EYE DISCHARGE: 0
VOMITING: 0

## 2021-11-18 NOTE — PROGRESS NOTES
Subjective:      Patient ID:  Nirmala Driver is a 77 y.o. male. HPI:    Patient presents today for recheck of [id number] HPV+ oral cavity. Patient started on 10/12/2021 on concurrent chemoradiation using weekly cisplatin. Chemotherapy was held last week due to cytopenias, is currently on cycle #5 of cisplatin. Swallow well. Weight stable. Still smoking, but less.        Past Medical History:   Diagnosis Date    Cancer [facility])     Tongue    COPD [facility]chronic obstructive pulmonary disease) [facility] [address]     Edentulous     History of hepatitis C 2019    treated-2019    Hoarseness of voice     For OR 7-12-21    Mesothelioma ([facility] [address]     Mouth sores     Tongue mass     left     Past Surgical History:   Procedure Laterality Date    FRACTURE SURGERY      KNEE ARTHROSCOPY Left 1980    LARYNGOSCOPY N/A 7/12/2021    PANENDOSCOPY BRONCHOSCOPY ESOPHAGOSCOPY MICROSUSPENSION DIRECT LARYNGOSCOPY performed by Sobeida Chavez DO at [facility] N/A 7/12/2021    BIOPSY OF TONGUE BASE WITH FROZEN SECTION AND LEFT TRUE VOCAL CORD BIOPSY performed by Sobeida Chavez DO at [facility] OR     Family History   Problem Relation Age of Onset    Cancer Father         lung     Social History     Socioeconomic History    Marital status: Single     Spouse name: None    Number of children: 0    Years of education: None    Highest education level: None   Occupational History    Occupation: dairy compressor   Tobacco Use    Smoking status: Current Every Day Smoker     Packs/day: 0.50     Years: 45.00     Pack years: 22.50     Types: Cigarettes    Smokeless tobacco: Never Used   Vaping Use    Vaping Use: Never used   Substance and Sexual Activity    Alcohol use: Not Currently    Drug use: Never    Sexual activity: None   Other Topics Concern    None   Social History Narrative    None     Social Determinants of Health     Financial Resource Strain:     Difficulty of Paying Living Expenses: Not on file   Food Insecurity:     Worried About Running Out of Food in the Last Year: Not on file    Harsh of Food in the Last Year: Not on file   Transportation Needs:     Lack of Transportation (Medical): Not on file    Lack of Transportation (Non-Medical): Not on file   Physical Activity:     Days of Exercise per Week: Not on file    Minutes of Exercise per Session: Not on file   Stress:     Feeling of Stress : Not on file   Social Connections:     Frequency of Communication with Friends and Family: Not on file    Frequency of Social Gatherings with Friends and Family: Not on file    Attends Quaker Services: Not on file    Active Member of Clubs or Organizations: Not on file    Attends Club or Organization Meetings: Not on file    Marital Status: Not on file   Intimate Partner Violence:     Fear of Current or Ex-Partner: Not on file    Emotionally Abused: Not on file    Physically Abused: Not on file    Sexually Abused: Not on file   Housing Stability:     Unable to Pay for Housing in the Last Year: Not on file    Number of [place] in the Last Year: Not on file    Unstable Housing in the Last Year: Not on file     No Known Allergies    Review of Systems   Constitutional: Negative. Negative for appetite change. HENT: Positive for mouth sores and sore throat. Eyes: Negative. Negative for pain, discharge and visual disturbance. Respiratory: Negative. Negative for apnea, chest tightness and shortness of breath. Cardiovascular: Negative. Negative for chest pain, palpitations and leg swelling. Gastrointestinal: Negative. Negative for abdominal pain, diarrhea and vomiting. Endocrine: Negative for cold intolerance, heat intolerance and polydipsia. Genitourinary: Negative. Negative for dysuria, flank pain and hematuria. Musculoskeletal: Negative. Negative for arthralgias, gait problem and neck pain. Skin: Negative. Negative for color change, pallor and rash.    Allergic/Immunologic: Negative for environmental allergies, food allergies and immunocompromised state. Neurological: Negative. Negative for dizziness, numbness and headaches. Hematological: Positive for adenopathy. Psychiatric/Behavioral: Negative. Negative for behavioral problems and hallucinations. All other systems reviewed and are negative. Objective:     Vitals:    11/18/21 1449   BP: 139/78   Pulse: 70   Temp: 97.1 °F (36.2 °C)   SpO2: 99%     Physical Exam  Vitals and nursing note reviewed. Constitutional:       Appearance: He is well-developed. HENT:      Head: Normocephalic and atraumatic. Right Ear: Hearing, tympanic membrane, ear canal and external ear normal.      Left Ear: Hearing, tympanic membrane, ear canal and external ear normal.      Nose: Septal deviation present. Comments: Severe DNS right 90%    Level ii lymphadenopathy left neck     Mouth/Throat:      Lips: Pink. Mouth: Mucous membranes are moist.      Dentition: Normal dentition. No dental tenderness, gingival swelling, dental caries, dental abscesses or gum lesions. Tongue: Lesions present. Comments: 3-4cm hard immobile area of the left posterior lateral tongue, which is getting softer from the last visit endophytic involving posterior midline of tongue  Decreased pain as well. Pt has limited opening of the mandible increased 4-5 mm    Eyes:      Conjunctiva/sclera: Conjunctivae normal.      Pupils: Pupils are equal, round, and reactive to light. Neck:     Cardiovascular:      Rate and Rhythm: Normal rate and regular rhythm. Heart sounds: Normal heart sounds. Pulmonary:      Effort: Pulmonary effort is normal.      Breath sounds: Normal breath sounds. Abdominal:      General: Bowel sounds are normal.      Palpations: Abdomen is soft. Musculoskeletal:      Cervical back: Normal range of motion and neck supple. Skin:     General: Skin is warm and dry.    Neurological:      Mental Status: He is alert and oriented to person, place, and time. Assessment:       Diagnosis Orders   1. Tongue cancer ([facility] [address]     2. Pain due to neoplasm           Plan:      Tumor seems to be responding to treatments  Follow up in 1-2 month(s)                     Sammy Antunez  1955    I have discussed the case, including pertinent history and exam findings with the resident. I have seen and examined the patient and the key elements of the encounter have been performed by me. I agree with the assessment, plan and orders as documented by the  resident              Remainder of medical problems as per  resident note. Patient seen and examined. Agree with above exam, assessment and plan.       Electronically signed by Lino Pal DO on 11/18/21 at 4:29 PM EST

## 2021-11-19 ENCOUNTER — APPOINTMENT (OUTPATIENT)
Dept: RADIATION ONCOLOGY | Age: 66
End: 2021-11-19
Attending: SPECIALIST
Payer: MEDICARE

## 2021-11-19 ENCOUNTER — HOSPITAL ENCOUNTER (OUTPATIENT)
Dept: RADIATION ONCOLOGY | Age: 66
Discharge: HOME | End: 2021-11-19
Attending: SPECIALIST
Payer: MEDICARE

## 2021-11-19 PROCEDURE — 77386 HC NTSTY MODUL RAD TX DLVR CPLX: CPT | Performed by: SPECIALIST

## 2021-11-19 PROCEDURE — 77014 PR CT GUIDANCE PLACEMENT RAD THERAPY FIELDS: CPT | Performed by: SPECIALIST

## 2021-11-21 ENCOUNTER — APPOINTMENT (OUTPATIENT)
Dept: RADIATION ONCOLOGY | Age: 66
End: 2021-11-21
Attending: SPECIALIST
Payer: MEDICARE

## 2021-11-21 ENCOUNTER — HOSPITAL ENCOUNTER (OUTPATIENT)
Dept: RADIATION ONCOLOGY | Age: 66
Discharge: HOME | End: 2021-11-21
Attending: SPECIALIST
Payer: MEDICARE

## 2021-11-21 PROCEDURE — 77014 PR CT GUIDANCE PLACEMENT RAD THERAPY FIELDS: CPT | Performed by: SPECIALIST

## 2021-11-21 PROCEDURE — 77386 HC NTSTY MODUL RAD TX DLVR CPLX: CPT | Performed by: SPECIALIST

## 2021-11-22 ENCOUNTER — APPOINTMENT (OUTPATIENT)
Dept: RADIATION ONCOLOGY | Age: 66
End: 2021-11-22
Attending: SPECIALIST
Payer: MEDICARE

## 2021-11-22 ENCOUNTER — HOSPITAL ENCOUNTER (OUTPATIENT)
Dept: RADIATION ONCOLOGY | Age: 66
Discharge: HOME | End: 2021-11-22
Attending: SPECIALIST
Payer: MEDICARE

## 2021-11-22 PROCEDURE — 77336 RADIATION PHYSICS CONSULT: CPT | Performed by: SPECIALIST

## 2021-11-22 PROCEDURE — 77386 HC NTSTY MODUL RAD TX DLVR CPLX: CPT | Performed by: SPECIALIST

## 2021-11-22 PROCEDURE — 77014 PR CT GUIDANCE PLACEMENT RAD THERAPY FIELDS: CPT | Performed by: SPECIALIST

## 2021-11-23 ENCOUNTER — TELEPHONE (OUTPATIENT)
Dept: ONCOLOGY | Age: 66
End: 2021-11-23

## 2021-11-23 ENCOUNTER — TELEPHONE (OUTPATIENT)
Dept: INFUSION THERAPY | Age: 66
End: 2021-11-23

## 2021-11-23 ENCOUNTER — APPOINTMENT (OUTPATIENT)
Dept: RADIATION ONCOLOGY | Age: 66
End: 2021-11-23
Attending: SPECIALIST
Payer: MEDICARE

## 2021-11-23 ENCOUNTER — HOSPITAL ENCOUNTER (OUTPATIENT)
Dept: RADIATION ONCOLOGY | Age: 66
End: 2021-11-23
Attending: SPECIALIST
Payer: MEDICARE

## 2021-11-23 NOTE — TELEPHONE ENCOUNTER
Called patient several times, his phone message stated no calls were going through, not able to leave message. Called Gerardo Omer, friend of patient, listed on [facility]'s [facility] Release NPP form as person our office is allowed to speak with. Left information for patient to call our office to reschedule his labs / follow up appointment he no showed for today, 11/23/21, and to cancel his chemo treatment tomorrow, 11/24/21. Will wait for patient to call our office to reschedule labs / follow up / chemo treatment.

## 2021-11-24 ENCOUNTER — APPOINTMENT (OUTPATIENT)
Dept: RADIATION ONCOLOGY | Age: 66
End: 2021-11-24
Attending: SPECIALIST
Payer: MEDICARE

## 2021-11-24 ENCOUNTER — HOSPITAL ENCOUNTER (OUTPATIENT)
Dept: RADIATION ONCOLOGY | Age: 66
Discharge: HOME | End: 2021-11-24
Attending: SPECIALIST
Payer: MEDICARE

## 2021-11-24 ENCOUNTER — HOSPITAL ENCOUNTER (OUTPATIENT)
Dept: INFUSION THERAPY | Age: 66
End: 2021-11-24

## 2021-11-24 VITALS
RESPIRATION RATE: 18 BRPM | TEMPERATURE: 97.8 F | HEART RATE: 87 BPM | OXYGEN SATURATION: 97 % | WEIGHT: 145.1 LBS | BODY MASS INDEX: 22.73 KG/M2 | DIASTOLIC BLOOD PRESSURE: 78 MMHG | SYSTOLIC BLOOD PRESSURE: 132 MMHG

## 2021-11-24 DIAGNOSIS — C76.0 HEAD AND NECK CANCER (HCC): Primary | ICD-10-CM

## 2021-11-24 PROCEDURE — 77386 HC NTSTY MODUL RAD TX DLVR CPLX: CPT | Performed by: SPECIALIST

## 2021-11-24 PROCEDURE — 77014 PR CT GUIDANCE PLACEMENT RAD THERAPY FIELDS: CPT | Performed by: SPECIALIST

## 2021-11-24 PROCEDURE — 99999 PR OFFICE/OUTPT VISIT,PROCEDURE ONLY: CPT | Performed by: RADIOLOGY

## 2021-11-24 ASSESSMENT — PAIN DESCRIPTION - LOCATION: LOCATION: MOUTH

## 2021-11-24 ASSESSMENT — PAIN SCALES - GENERAL: PAINLEVEL_OUTOF10: 7

## 2021-11-24 NOTE — PATIENT INSTRUCTIONS
Continue daily fractionated radiation therapy as scheduled. Please see weekly OTV note and intial consultation letter in [facility] for clinical details. Jessie Willams. Ann Marie Jeffery MD MS [facility] [place]:  [phone]   FAX: [phone]  [facility]:  [phone]   FAX:    [phone]  [address] [place] [address] Road:  [phone]   FAX:  [phone]  Email: [email or web address]

## 2021-11-24 NOTE — PROGRESS NOTES
Heidy Andre  11/24/2021  Wt Readings from Last 3 Encounters:   11/24/21 145 lb 1.6 oz (65.8 kg)   11/18/21 147 lb 9.6 oz (67 kg)   11/16/21 147 lb 12.8 oz (67 kg)     Body mass index is 22.73 kg/m². Treatment Area:[address]8    Patient was seen today for weekly visit. Comfort Alteration  KPS:80%  Fatigue: Moderate    Mucous Membrane Alteration  Mucositis Due to Radiation: No  Thrush: No  Voice Changes: Yes    Nutritional Alteration  Anorexia: Yes   Nausea: No   Vomiting: No     Skin Alteration   Sensation:no    Radiation Dermatitis:  no    Ventilation Alteration  Cough:No    Emotional  Coping: somewhat effective    Injury, potential bleeding or infection: no    Radioprotectant Tolerance  Yes            Lab Results   Component Value Date    WBC 2.4 (L) 11/16/2021     (L) 11/16/2021         /78   Pulse 87   Temp 97.8 °F (36.6 °C) (Temporal)   Resp 18   Wt 145 lb 1.6 oz (65.8 kg)   SpO2 97%   BMI 22.73 kg/m²   BP within normal range? yes         Assessment/Plan: pt completed 31/37fx and 6090/7400cGy. Pt is tolerating tx well thus far.      Livia Ybarra RN

## 2021-11-24 NOTE — PROGRESS NOTES
DEPARTMENT OF RADIATION ONCOLOGY ON TREATMENT VISIT         11/24/2021      NAME:  Conchis Andre    YOB: 1955    Diagnosis: HN CA    SUBJECTIVE:   Cady Ocampo has now received fractionated external beam radiation therapy - ongoing. Past medical, surgical, social and family histories reviewed and updated as indicated. Pain: controlled    ALLERGIES:  Patient has no known allergies. Current Outpatient Medications   Medication Sig Dispense Refill    Magic Mouthwash (MIRACLE MOUTHWASH) Swish and spit 5 mLs 4 times daily as needed for Irritation      ibuprofen (ADVIL;MOTRIN) 600 MG tablet  (Patient not taking: Reported on 11/18/2021)      ondansetron (ZOFRAN) 8 MG tablet Take 1 tablet by mouth every 12 hours as needed for Nausea or Vomiting (Patient not taking: Reported on 11/18/2021) 30 tablet 1    prochlorperazine (COMPAZINE) 10 MG tablet Take 1 tablet by mouth every 6 hours as needed (nausea, vomiting) (Patient not taking: Reported on 11/18/2021) 30 tablet 2    oxyCODONE-acetaminophen (PERCOCET) 7.5-325 MG per tablet Take 1 tablet by mouth every 8 hours as needed.  diazePAM (VALIUM) 5 MG tablet Take 5 mg by mouth 2 times daily.  albuterol sulfate  (90 Base) MCG/ACT inhaler INHALE 2 PUFFS INTO THE LUNGS EVERY 6 HOURS AS NEEDED FOR WHEEZING (Patient not taking: Reported on 11/18/2021) 54 g 3    ADVAIR DISKUS 250-50 MCG/DOSE AEPB Inhale 1 puff into the lungs 2 times daily  (Patient not taking: Reported on 11/18/2021)       No current facility-administered medications for this encounter. OBJECTIVE:  Alert and fully ambulatory. Pleasant and conversant. Physical Examination: General appearance - alert, well appearing, and in no distress.           Wt Readings from Last 3 Encounters:   11/24/21 145 lb 1.6 oz (65.8 kg)   11/18/21 147 lb 9.6 oz (67 kg)   11/16/21 147 lb 12.8 oz (67 kg)         ASSESSMENT/PLAN:     Patient is tolerating treatments well with expected toxicities. RBA were reviewed prior to first fraction and PRN. Current and planned dose reviewed. Goals of treatment and potential side effects were reviewed with the patient PRN. Treatment imaging has been personally reviewed for accuracy and precision. Questions answered to apparent satisfaction. Treatments will continue as planned. Allyson Bowens.  Ritesh Munguia MD MS DANNYR  Radiation Oncologist        [facility] ([address] [place] [address]): [phone] /// FAX: [phone]  [facility]): [phone] /// FAX: [phone]  [address] [place] [address]): [phone] /// FAX: [phone]

## 2021-11-29 ENCOUNTER — APPOINTMENT (OUTPATIENT)
Dept: RADIATION ONCOLOGY | Age: 66
End: 2021-11-29
Attending: SPECIALIST
Payer: MEDICARE

## 2021-11-29 ENCOUNTER — HOSPITAL ENCOUNTER (OUTPATIENT)
Dept: RADIATION ONCOLOGY | Age: 66
Discharge: HOME | End: 2021-11-29
Attending: SPECIALIST
Payer: MEDICARE

## 2021-11-29 PROCEDURE — 77014 PR CT GUIDANCE PLACEMENT RAD THERAPY FIELDS: CPT | Performed by: SPECIALIST

## 2021-11-29 PROCEDURE — 77386 HC NTSTY MODUL RAD TX DLVR CPLX: CPT | Performed by: SPECIALIST

## 2021-11-30 ENCOUNTER — APPOINTMENT (OUTPATIENT)
Dept: RADIATION ONCOLOGY | Age: 66
End: 2021-11-30
Attending: SPECIALIST
Payer: MEDICARE

## 2021-11-30 ENCOUNTER — HOSPITAL ENCOUNTER (OUTPATIENT)
Dept: RADIATION ONCOLOGY | Age: 66
Discharge: HOME | End: 2021-11-30
Attending: SPECIALIST
Payer: MEDICARE

## 2021-11-30 ENCOUNTER — HOSPITAL ENCOUNTER (OUTPATIENT)
Dept: INFUSION THERAPY | Age: 66
Discharge: HOME | End: 2021-11-30
Payer: MEDICARE

## 2021-11-30 ENCOUNTER — OFFICE VISIT (OUTPATIENT)
Dept: ONCOLOGY | Age: 66
End: 2021-11-30
Payer: MEDICARE

## 2021-11-30 VITALS
BODY MASS INDEX: 21.82 KG/M2 | DIASTOLIC BLOOD PRESSURE: 67 MMHG | HEIGHT: 67 IN | WEIGHT: 139 LBS | SYSTOLIC BLOOD PRESSURE: 142 MMHG | HEART RATE: 82 BPM | TEMPERATURE: 97.2 F

## 2021-11-30 DIAGNOSIS — C76.0 PRIMARY SQUAMOUS CELL CARCINOMA OF HEAD AND NECK (HCC): ICD-10-CM

## 2021-11-30 DIAGNOSIS — C76.0 PRIMARY SQUAMOUS CELL CARCINOMA OF HEAD AND NECK (HCC): Primary | ICD-10-CM

## 2021-11-30 LAB
ALBUMIN SERPL-MCNC: 4.3 G/DL (ref 3.5–5.2)
ALP BLD-CCNC: 81 U/L (ref 40–129)
ALT SERPL-CCNC: 6 U/L (ref 0–40)
ANION GAP SERPL CALCULATED.3IONS-SCNC: 13 MMOL/L (ref 7–16)
ANISOCYTOSIS: ABNORMAL
AST SERPL-CCNC: 14 U/L (ref 0–39)
BASOPHILS ABSOLUTE: 0.02 E9/L (ref 0–0.2)
BASOPHILS RELATIVE PERCENT: 0.6 % (ref 0–2)
BILIRUB SERPL-MCNC: 0.2 MG/DL (ref 0–1.2)
BUN BLDV-MCNC: 8 MG/DL (ref 6–23)
CALCIUM SERPL-MCNC: 10.9 MG/DL (ref 8.6–10.2)
CHLORIDE BLD-SCNC: 95 MMOL/L (ref 98–107)
CO2: 27 MMOL/L (ref 22–29)
CREAT SERPL-MCNC: 1.1 MG/DL (ref 0.7–1.2)
EOSINOPHILS ABSOLUTE: 0.01 E9/L (ref 0.05–0.5)
EOSINOPHILS RELATIVE PERCENT: 0.3 % (ref 0–6)
GFR AFRICAN AMERICAN: >60
GFR NON-AFRICAN AMERICAN: >60 ML/MIN/1.73
GLUCOSE BLD-MCNC: 118 MG/DL (ref 74–99)
HCT VFR BLD CALC: 31.4 % (ref 37–54)
HEMOGLOBIN: 10.8 G/DL (ref 12.5–16.5)
LYMPHOCYTES ABSOLUTE: 0.21 E9/L (ref 1.5–4)
LYMPHOCYTES RELATIVE PERCENT: 5.8 % (ref 20–42)
MAGNESIUM: 2.3 MG/DL (ref 1.6–2.6)
MCH RBC QN AUTO: 31.3 PG (ref 26–35)
MCHC RBC AUTO-ENTMCNC: 34.4 % (ref 32–34.5)
MCV RBC AUTO: 91 FL (ref 80–99.9)
MONOCYTES ABSOLUTE: 0.59 E9/L (ref 0.1–0.95)
MONOCYTES RELATIVE PERCENT: 16.4 % (ref 2–12)
NEUTROPHILS ABSOLUTE: 2.68 E9/L (ref 1.8–7.3)
NEUTROPHILS RELATIVE PERCENT: 74.7 % (ref 43–80)
PDW BLD-RTO: 14.9 FL (ref 11.5–15)
PLATELET # BLD: 302 E9/L (ref 130–450)
PMV BLD AUTO: 10.5 FL (ref 7–12)
POLYCHROMASIA: ABNORMAL
POTASSIUM SERPL-SCNC: 4.6 MMOL/L (ref 3.5–5)
RBC # BLD: 3.45 E12/L (ref 3.8–5.8)
SODIUM BLD-SCNC: 135 MMOL/L (ref 132–146)
TOTAL PROTEIN: 7.7 G/DL (ref 6.4–8.3)
WBC # BLD: 3.6 E9/L (ref 4.5–11.5)

## 2021-11-30 PROCEDURE — 99214 OFFICE O/P EST MOD 30 MIN: CPT | Performed by: INTERNAL MEDICINE

## 2021-11-30 PROCEDURE — 77014 PR CT GUIDANCE PLACEMENT RAD THERAPY FIELDS: CPT | Performed by: SPECIALIST

## 2021-11-30 PROCEDURE — 99212 OFFICE O/P EST SF 10 MIN: CPT

## 2021-11-30 PROCEDURE — 77386 HC NTSTY MODUL RAD TX DLVR CPLX: CPT | Performed by: SPECIALIST

## 2021-11-30 PROCEDURE — 83735 ASSAY OF MAGNESIUM: CPT

## 2021-11-30 PROCEDURE — 85025 COMPLETE CBC W/AUTO DIFF WBC: CPT

## 2021-11-30 PROCEDURE — 36415 COLL VENOUS BLD VENIPUNCTURE: CPT

## 2021-11-30 PROCEDURE — 80053 COMPREHEN METABOLIC PANEL: CPT

## 2021-11-30 RX ORDER — PALONOSETRON HYDROCHLORIDE 0.05 MG/ML
0.25 INJECTION, SOLUTION INTRAVENOUS ONCE
Status: CANCELLED | OUTPATIENT
Start: 2021-12-01

## 2021-11-30 RX ORDER — METHYLPREDNISOLONE SODIUM SUCCINATE 125 MG/2ML
125 INJECTION, POWDER, LYOPHILIZED, FOR SOLUTION INTRAMUSCULAR; INTRAVENOUS ONCE
Status: CANCELLED | OUTPATIENT
Start: 2021-12-01 | End: 2021-12-01

## 2021-11-30 RX ORDER — SODIUM CHLORIDE 9 MG/ML
25 INJECTION, SOLUTION INTRAVENOUS PRN
Status: CANCELLED | OUTPATIENT
Start: 2021-12-01

## 2021-11-30 RX ORDER — DEXAMETHASONE SODIUM PHOSPHATE 10 MG/ML
10 INJECTION, SOLUTION INTRAMUSCULAR; INTRAVENOUS ONCE
Status: CANCELLED | OUTPATIENT
Start: 2021-12-01

## 2021-11-30 RX ORDER — SODIUM CHLORIDE 9 MG/ML
20 INJECTION, SOLUTION INTRAVENOUS ONCE
Status: CANCELLED | OUTPATIENT
Start: 2021-12-01 | End: 2021-12-01

## 2021-11-30 RX ORDER — HEPARIN SODIUM (PORCINE) LOCK FLUSH IV SOLN 100 UNIT/ML 100 UNIT/ML
500 SOLUTION INTRAVENOUS PRN
Status: CANCELLED | OUTPATIENT
Start: 2021-12-01

## 2021-11-30 RX ORDER — DIPHENHYDRAMINE HYDROCHLORIDE 50 MG/ML
50 INJECTION INTRAMUSCULAR; INTRAVENOUS ONCE
Status: CANCELLED | OUTPATIENT
Start: 2021-12-01 | End: 2021-12-01

## 2021-11-30 RX ORDER — SODIUM CHLORIDE 9 MG/ML
INJECTION, SOLUTION INTRAVENOUS CONTINUOUS
Status: CANCELLED | OUTPATIENT
Start: 2021-12-01

## 2021-11-30 RX ORDER — EPINEPHRINE 1 MG/ML
0.3 INJECTION, SOLUTION, CONCENTRATE INTRAVENOUS PRN
Status: CANCELLED | OUTPATIENT
Start: 2021-12-01

## 2021-11-30 RX ORDER — SODIUM CHLORIDE 0.9 % (FLUSH) 0.9 %
5-40 SYRINGE (ML) INJECTION PRN
Status: CANCELLED | OUTPATIENT
Start: 2021-12-01

## 2021-11-30 NOTE — PROGRESS NOTES
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 Referring Physician:  Jhonny Quintero DO    PCP:  Joaquín Dove MD    History of Present Illness:      Mr. Radha Romero is a pleasant 14-year-old gentleman, with a past medical history significant for tobacco use disorder, COPD, and hepatitis C status post treatment in 2019, who had presented with left oral tongue lesion, when he developed a left neck mass, he was treated with a short course of antibiotics, which did not help, he subsequently had a work-up done, including a CT of the neck which had revealed an abnormal mass in the lateral aspect of the oral tongue involving a significant portion of the tongue measuring 4 x 2 x 2.8 cm, a level 2A lymph node was also noted, The patient underwent a laryngoscopy on 7/19/2021 which revealed a benign-appearing polyp on the left true vocal cord but a mass involving the left tonsil fossa. This was biopsied and found to be a invasive squamous cell carcinoma p16-positive. PET/CT was done on 8/3/2021, revealing a masslike structure involving the left tongue extending to the posterior pharyngeal wall crossing the midline. There was also metabolic activity noted in a zone 3 node on the ipsilateral side. The patient started on 10/12/2021 on concurrent chemoradiation using weekly cisplatin. He denies any nausea or vomiting, he has mouth pain, the symptoms related to the cancer had improved, he missed his chemo treatment last week. Review of Systems;  CONSTITUTIONAL: No fever, chills. Decreased appetite and energy level. Positive for weight loss. ENMT: Eyes: No diplopia; Nose: No epistaxis. Mouth: pos for mouth pain, odynophagia and dysphagia. Pos for hoarseness of the voice.     RESPIRATORY: No hemoptysis, positive for mild chronic shortness of breath, cough. CARDIOVASCULAR: No chest pain, palpitations. GASTROINTESTINAL: No nausea/vomiting, abdominal pain, diarrhea/constipation. GENITOURINARY: No dysuria, urinary frequency, hematuria. NEURO: No syncope, presyncope, headache. Remainder:  ROS NEGATIVE    Past Medical History:      Diagnosis Date    Cancer ([facility] [address]     Tongue    COPD (chronic obstructive pulmonary disease) ([facility] [address]     Edentulous     History of hepatitis C 2019    treated-2019    Hoarseness of voice     For OR 7-12-21    Mesothelioma ([address] [address]     Mouth sores     Tongue mass     left     Patient Active Problem List   Diagnosis    COPD (chronic obstructive pulmonary disease) ([facility] [address]    Lumbar radiculopathy    Hepatitis C without hepatic coma    Tongue mass    Primary squamous cell carcinoma of head and neck ([facility] [address]        Past Surgical History:      Procedure Laterality Date    FRACTURE SURGERY      KNEE ARTHROSCOPY Left 1980    LARYNGOSCOPY N/A 7/12/2021    PANENDOSCOPY BRONCHOSCOPY ESOPHAGOSCOPY MICROSUSPENSION DIRECT LARYNGOSCOPY performed by Fiona Colon DO at [facility] N/A 7/12/2021    BIOPSY OF TONGUE BASE WITH FROZEN SECTION AND LEFT TRUE VOCAL CORD BIOPSY performed by Fiona Colon DO at [facility]       Family History:  Family History   Problem Relation Age of Onset    Cancer Father         lung       Medications:  Reviewed and reconciled.     Social History:  Social History     Socioeconomic History    Marital status: Single     Spouse name: Not on file    Number of children: 0    Years of education: Not on file    Highest education level: Not on file   Occupational History    Occupation: dairy compressor   Tobacco Use    Smoking status: Current Every Day Smoker     Packs/day: 0.50     Years: 45.00     Pack years: 22.50     Types: Cigarettes    Smokeless tobacco: Never Used   Vaping Use    Vaping Use: Never used   Substance and Sexual Activity    Alcohol use: Not Currently    Drug use: Never    Sexual activity: Not on file   Other Topics Concern    Not on file   Social History Narrative    Not on file     Social Determinants of Health     Financial Resource Strain:     Difficulty of Paying Living Expenses: Not on file   Food Insecurity:     Worried About Running Out of Food in the Last Year: Not on file    Harsh of Food in the Last Year: Not on file   Transportation Needs:     Lack of Transportation (Medical): Not on file    Lack of Transportation (Non-Medical): Not on file   Physical Activity:     Days of Exercise per Week: Not on file    Minutes of Exercise per Session: Not on file   Stress:     Feeling of Stress : Not on file   Social Connections:     Frequency of Communication with Friends and Family: Not on file    Frequency of Social Gatherings with Friends and Family: Not on file    Attends Congregation Services: Not on file    Active Member of [address] [place] [address] [employer] or Organizations: Not on file    Attends Club or Organization Meetings: Not on file    Marital Status: Not on file   Intimate Partner Violence:     Fear of Current or Ex-Partner: Not on file    Emotionally Abused: Not on file    Physically Abused: Not on file    Sexually Abused: Not on file   Housing Stability:     Unable to Pay for Housing in the Last Year: Not on file    Number of [place] in the Last Year: Not on file    Unstable Housing in the Last Year: Not on file       Allergies:  No Known Allergies    Physical Exam:  BP (!) 142/67 (Site: Left Upper Arm, Position: Sitting)   Pulse 82   Temp 97.2 °F (36.2 °C) (Infrared)   Ht 5' 7\" (1.702 m)   Wt 139 lb (63 kg)   BMI 21.77 kg/m²     GENERAL: Alert, oriented x 3, not in acute distress. HEENT: PERRLA; EOMI. Positive for trismus, improved motion of the tongue. NECK: Supple. Positive for radiation dermatitis. He has fullness of the left neck. LUNGS: Good air entry bilaterally. No wheezing, crackles or rhonchi.    CARDIOVASCULAR: Regular

## 2021-12-01 ENCOUNTER — APPOINTMENT (OUTPATIENT)
Dept: RADIATION ONCOLOGY | Age: 66
End: 2021-12-01
Attending: SPECIALIST
Payer: MEDICARE

## 2021-12-01 ENCOUNTER — HOSPITAL ENCOUNTER (OUTPATIENT)
Dept: INFUSION THERAPY | Age: 66
Discharge: HOME | End: 2021-12-01
Payer: MEDICARE

## 2021-12-01 ENCOUNTER — HOSPITAL ENCOUNTER (OUTPATIENT)
Dept: RADIATION ONCOLOGY | Age: 66
Discharge: HOME | End: 2021-12-01
Attending: SPECIALIST
Payer: MEDICARE

## 2021-12-01 VITALS
SYSTOLIC BLOOD PRESSURE: 119 MMHG | TEMPERATURE: 97.3 F | RESPIRATION RATE: 16 BRPM | DIASTOLIC BLOOD PRESSURE: 67 MMHG | HEART RATE: 67 BPM

## 2021-12-01 DIAGNOSIS — C76.0 PRIMARY SQUAMOUS CELL CARCINOMA OF HEAD AND NECK (HCC): Primary | ICD-10-CM

## 2021-12-01 PROCEDURE — 2580000003 HC RX 258: Performed by: INTERNAL MEDICINE

## 2021-12-01 PROCEDURE — 77386 HC NTSTY MODUL RAD TX DLVR CPLX: CPT | Performed by: SPECIALIST

## 2021-12-01 PROCEDURE — 77014 PR CT GUIDANCE PLACEMENT RAD THERAPY FIELDS: CPT | Performed by: SPECIALIST

## 2021-12-01 PROCEDURE — 96413 CHEMO IV INFUSION 1 HR: CPT

## 2021-12-01 PROCEDURE — 96375 TX/PRO/DX INJ NEW DRUG ADDON: CPT

## 2021-12-01 PROCEDURE — 96415 CHEMO IV INFUSION ADDL HR: CPT

## 2021-12-01 PROCEDURE — 96366 THER/PROPH/DIAG IV INF ADDON: CPT

## 2021-12-01 PROCEDURE — 96367 TX/PROPH/DG ADDL SEQ IV INF: CPT

## 2021-12-01 PROCEDURE — 6360000002 HC RX W HCPCS: Performed by: INTERNAL MEDICINE

## 2021-12-01 RX ORDER — PALONOSETRON HYDROCHLORIDE 0.05 MG/ML
0.25 INJECTION, SOLUTION INTRAVENOUS ONCE
Status: COMPLETED | OUTPATIENT
Start: 2021-12-01 | End: 2021-12-01

## 2021-12-01 RX ORDER — DEXAMETHASONE SODIUM PHOSPHATE 10 MG/ML
10 INJECTION, SOLUTION INTRAMUSCULAR; INTRAVENOUS ONCE
Status: COMPLETED | OUTPATIENT
Start: 2021-12-01 | End: 2021-12-01

## 2021-12-01 RX ORDER — SODIUM CHLORIDE 9 MG/ML
20 INJECTION, SOLUTION INTRAVENOUS ONCE
Status: DISCONTINUED | OUTPATIENT
Start: 2021-12-01 | End: 2021-12-02 | Stop reason: HOSPADM

## 2021-12-01 RX ORDER — SODIUM CHLORIDE 0.9 % (FLUSH) 0.9 %
5-40 SYRINGE (ML) INJECTION PRN
Status: DISCONTINUED | OUTPATIENT
Start: 2021-12-01 | End: 2021-12-02 | Stop reason: HOSPADM

## 2021-12-01 RX ADMIN — FOSAPREPITANT 150 MG: 150 INJECTION, POWDER, LYOPHILIZED, FOR SOLUTION INTRAVENOUS at 09:34

## 2021-12-01 RX ADMIN — SODIUM CHLORIDE 20 ML/HR: 9 INJECTION, SOLUTION INTRAVENOUS at 09:27

## 2021-12-01 RX ADMIN — SODIUM CHLORIDE, PRESERVATIVE FREE 10 ML: 5 INJECTION INTRAVENOUS at 09:30

## 2021-12-01 RX ADMIN — DEXAMETHASONE SODIUM PHOSPHATE 10 MG: 10 INJECTION, SOLUTION INTRAMUSCULAR; INTRAVENOUS at 09:30

## 2021-12-01 RX ADMIN — PALONOSETRON 0.25 MG: 0.25 INJECTION, SOLUTION INTRAVENOUS at 09:27

## 2021-12-01 RX ADMIN — POTASSIUM CHLORIDE: 2 INJECTION, SOLUTION, CONCENTRATE INTRAVENOUS at 10:18

## 2021-12-02 ENCOUNTER — HOSPITAL ENCOUNTER (OUTPATIENT)
Dept: RADIATION ONCOLOGY | Age: 66
Discharge: HOME | End: 2021-12-02
Attending: SPECIALIST
Payer: MEDICARE

## 2021-12-02 ENCOUNTER — APPOINTMENT (OUTPATIENT)
Dept: RADIATION ONCOLOGY | Age: 66
End: 2021-12-02
Attending: SPECIALIST
Payer: MEDICARE

## 2021-12-02 VITALS
WEIGHT: 144 LBS | BODY MASS INDEX: 22.55 KG/M2 | RESPIRATION RATE: 18 BRPM | SYSTOLIC BLOOD PRESSURE: 122 MMHG | HEART RATE: 74 BPM | DIASTOLIC BLOOD PRESSURE: 70 MMHG | TEMPERATURE: 97.8 F | OXYGEN SATURATION: 96 %

## 2021-12-02 PROCEDURE — 77427 RADIATION TX MANAGEMENT X5: CPT | Performed by: SPECIALIST

## 2021-12-02 PROCEDURE — 77386 HC NTSTY MODUL RAD TX DLVR CPLX: CPT | Performed by: SPECIALIST

## 2021-12-02 PROCEDURE — 77336 RADIATION PHYSICS CONSULT: CPT | Performed by: SPECIALIST

## 2021-12-02 PROCEDURE — 77014 PR CT GUIDANCE PLACEMENT RAD THERAPY FIELDS: CPT | Performed by: SPECIALIST

## 2021-12-02 ASSESSMENT — PAIN SCALES - GENERAL: PAINLEVEL_OUTOF10: 9

## 2021-12-02 ASSESSMENT — PAIN DESCRIPTION - LOCATION: LOCATION: MOUTH

## 2021-12-02 NOTE — PROGRESS NOTES
DEPARTMENT OF RADIATION ONCOLOGY   ON TREATMENT VISIT       12/2/2021      NAME:  Kaia Andre    YOB: 1955    DIAGNOSIS: [id number] SCC left oral tongue    SUBJECTIVE:   Nikki Martinez has now received 7200 cGy in 36/37 fractions directed to the left oral tongue and bilateral manuela-neck. Past medical, surgical, social and family histories reviewed and updated as indicated. PAIN: 0/10    ALLERGIES:  Patient has no known allergies. Current Outpatient Medications   Medication Sig Dispense Refill    Magic Mouthwash (MIRACLE MOUTHWASH) Swish and spit 5 mLs 4 times daily as needed for Irritation      ibuprofen (ADVIL;MOTRIN) 600 MG tablet  (Patient not taking: Reported on 11/30/2021)      ondansetron (ZOFRAN) 8 MG tablet Take 1 tablet by mouth every 12 hours as needed for Nausea or Vomiting 30 tablet 1    prochlorperazine (COMPAZINE) 10 MG tablet Take 1 tablet by mouth every 6 hours as needed (nausea, vomiting) 30 tablet 2    oxyCODONE-acetaminophen (PERCOCET) 7.5-325 MG per tablet Take 1 tablet by mouth every 8 hours as needed. (Patient not taking: Reported on 11/30/2021)      diazePAM (VALIUM) 5 MG tablet Take 5 mg by mouth 2 times daily.  albuterol sulfate  (90 Base) MCG/ACT inhaler INHALE 2 PUFFS INTO THE LUNGS EVERY 6 HOURS AS NEEDED FOR WHEEZING 54 g 3    ADVAIR DISKUS 250-50 MCG/DOSE AEPB Inhale 1 puff into the lungs 2 times daily        No current facility-administered medications for this encounter. OBJECTIVE:  Alert and fully ambulatory. Pleasant and conversant. Physical Examination:General appearance - alert, well appearing, and in no distress:  Constitutional: A well developed, well nourished 77 y.o. male who is alert, oriented, cooperative and in no apparent distress. HEENT: Grade 1 xerostomia, minimal mucositis. No evidence of infection  Skin:  Warm and dry. No obvious rashes. No erythema.     Vitals:    12/02/21 0911   BP: 122/70   Pulse: 74   Resp: 18   Temp: 97.8 °F (36.6 °C)   TempSrc: Temporal   SpO2: 96%   Weight: 144 lb (65.3 kg)       Wt Readings from Last 3 Encounters:   21 144 lb (65.3 kg)   21 139 lb (63 kg)   21 145 lb 1.6 oz (65.8 kg)       ASSESSMENT/PLAN:     Patient is tolerating treatments well with expected toxicities. Will complete tomorrow. Gave patient instruction on post-treatment care and follow-up. Current and planned dose reviewed. Goals of treatment and potential side effects were reviewed with the patient. Treatment imaging has been personally reviewed for accuracy and precision. Questions answered to apparent satisfaction. Treatments will continue as planned. Thank you for the opportunity to participate in multidisciplinary management of this remarkable and pleasant patient.       Katja Weber MD, [address], Milind Cristobal, [facility]    Department of Radiation Oncology  [facility]) [facility]: [phone])  [address] [place] [address]: [phone] ([place]: [phone])  [facility]) [facility]:  [phone] ([place]:  [phone])

## 2021-12-02 NOTE — PROGRESS NOTES
Yoko Andre  12/2/2021  Wt Readings from Last 3 Encounters:   12/02/21 144 lb (65.3 kg)   11/30/21 139 lb (63 kg)   11/24/21 145 lb 1.6 oz (65.8 kg)     Body mass index is 22.55 kg/m². Treatment Area:PTV 2488    Patient was seen today for weekly visit. Comfort Alteration  KPS:80%  Fatigue: Moderate    Mucous Membrane Alteration  Mucositis Due to Radiation: No  Thrush: No  Voice Changes: Yes    Nutritional Alteration  Anorexia: Yes   Nausea: No   Vomiting: No     Skin Alteration   Sensation:no    Radiation Dermatitis:  no    Ventilation Alteration  Cough:No    Emotional  Coping: not very effective    Injury, potential bleeding or infection: no    Radioprotectant Tolerance  Yes            Lab Results   Component Value Date    WBC 3.6 (L) 11/30/2021     11/30/2021         /70   Pulse 74   Temp 97.8 °F (36.6 °C) (Temporal)   Resp 18   Wt 144 lb (65.3 kg)   SpO2 96%   BMI 22.55 kg/m²   BP within normal range? yes         Assessment/Plan: Pt completed 36/37fx and 7200/7400cGy.     Dorothy Santos RN

## 2021-12-03 ENCOUNTER — APPOINTMENT (OUTPATIENT)
Dept: RADIATION ONCOLOGY | Age: 66
End: 2021-12-03
Attending: SPECIALIST
Payer: MEDICARE

## 2021-12-03 ENCOUNTER — HOSPITAL ENCOUNTER (OUTPATIENT)
Dept: RADIATION ONCOLOGY | Age: 66
Discharge: HOME | End: 2021-12-03
Attending: SPECIALIST
Payer: MEDICARE

## 2021-12-03 PROCEDURE — 77014 PR CT GUIDANCE PLACEMENT RAD THERAPY FIELDS: CPT | Performed by: SPECIALIST

## 2021-12-03 PROCEDURE — 77386 HC NTSTY MODUL RAD TX DLVR CPLX: CPT | Performed by: SPECIALIST

## 2021-12-06 ENCOUNTER — TELEPHONE (OUTPATIENT)
Dept: INFUSION THERAPY | Age: 66
End: 2021-12-06

## 2021-12-06 ENCOUNTER — APPOINTMENT (OUTPATIENT)
Dept: RADIATION ONCOLOGY | Age: 66
End: 2021-12-06
Attending: SPECIALIST
Payer: MEDICARE

## 2021-12-06 ENCOUNTER — HOSPITAL ENCOUNTER (OUTPATIENT)
Dept: RADIATION ONCOLOGY | Age: 66
Discharge: HOME | End: 2021-12-06
Attending: SPECIALIST
Payer: MEDICARE

## 2021-12-06 ENCOUNTER — CLINICAL DOCUMENTATION (OUTPATIENT)
Dept: RADIATION ONCOLOGY | Age: 66
End: 2021-12-06

## 2021-12-06 PROCEDURE — 77014 PR CT GUIDANCE PLACEMENT RAD THERAPY FIELDS: CPT | Performed by: SPECIALIST

## 2021-12-06 PROCEDURE — 77386 HC NTSTY MODUL RAD TX DLVR CPLX: CPT | Performed by: SPECIALIST

## 2021-12-06 NOTE — PROGRESS NOTES
Keyanna Sidney  12/6/2021  7:35 AM          Current Outpatient Medications   Medication Sig Dispense Refill    Magic Mouthwash (MIRACLE MOUTHWASH) Swish and spit 5 mLs 4 times daily as needed for Irritation      ibuprofen (ADVIL;MOTRIN) 600 MG tablet  (Patient not taking: Reported on 11/30/2021)      ondansetron (ZOFRAN) 8 MG tablet Take 1 tablet by mouth every 12 hours as needed for Nausea or Vomiting 30 tablet 1    prochlorperazine (COMPAZINE) 10 MG tablet Take 1 tablet by mouth every 6 hours as needed (nausea, vomiting) 30 tablet 2    oxyCODONE-acetaminophen (PERCOCET) 7.5-325 MG per tablet Take 1 tablet by mouth every 8 hours as needed. (Patient not taking: Reported on 11/30/2021)      diazePAM (VALIUM) 5 MG tablet Take 5 mg by mouth 2 times daily.  albuterol sulfate  (90 Base) MCG/ACT inhaler INHALE 2 PUFFS INTO THE LUNGS EVERY 6 HOURS AS NEEDED FOR WHEEZING 54 g 3    ADVAIR DISKUS 250-50 MCG/DOSE AEPB Inhale 1 puff into the lungs 2 times daily        No current facility-administered medications for this encounter. This is an up-to-date medication list.    Please take this list to your next care provider, and discard any previous medication lists.

## 2021-12-06 NOTE — PROGRESS NOTES
Radiation Treatment Summary    Patient Name:  Fernanda García  [birth year],  77 y.o., male       Referring Physician: No referring provider defined for this encounter. PCP: Elsy Morris MD       Diagnosis: [id number] SCC left oral tongue       Recent History: Left oral tongue mass    Site Start [address] [facility] ED Fractions Dose (cGy) Fx Dose (cGy) Technique   Left oral tongue & bilateral manuela-neck 10/12/2021 11/16/2021 35 25 5000 200 VMAT   Left oral tongue & bilateral manuela-neck [place] 11/17/2021 11/22/2021 5 5 1000 200 VMAT   Left oral tongue & ipsilateral manuela-neck [place] 11/24/2021 12/6/2021 12 7 1400 200 VMAT   TOTAL 10/12/2021 12/6/2021 55 37 7400         Response/Tolerance: As expected, Marely Giraldo experienced grade 2 mucositis, grade 2 xerostomia and dysphagia. He had a grade 1 dull skin erythema without desquamation. His weight remained relatively stable throughout the course of treatment. Follow-up:  30-day in the office with Radiation Oncology      Thank you for the opportunity to participate in multidisciplinary management of this remarkable and pleasant patient.       Annia Pearson MD, [address], [place] [address]rano, [address] [facility]    Department of Radiation Oncology  [facility]) [facility]: [phone] (KRYSTAL: [phone])  [facility] Bond) [facility]: [phone] (RAW: [phone])  [facility]) [facility]:  [phone] (GOD:  [phone])

## 2021-12-06 NOTE — TELEPHONE ENCOUNTER
Mable Andre  12/6/2021  Wt Readings from Last 10 Encounters:   12/02/21 144 lb (65.3 kg)   11/30/21 139 lb (63 kg)   11/24/21 145 lb 1.6 oz (65.8 kg)   11/18/21 147 lb 9.6 oz (67 kg)   11/16/21 147 lb 12.8 oz (67 kg)   11/10/21 149 lb 8 oz (67.8 kg)   11/09/21 146 lb 14.4 oz (66.6 kg)   11/02/21 146 lb (66.2 kg)   11/02/21 146 lb 9.6 oz (66.5 kg)   10/26/21 145 lb (65.8 kg)     Ht Readings from Last 1 Encounters:   11/30/21 5' 7\" (1.702 m)     There is no height or weight on file to calculate BMI. Met with patient for follow up today following his last RT. He is doing remarkably well. He said he was able to consume meatloaf, mashed potatoes and peas last night. He continues to eat about twice daily, then drink 2-3 Ensure Plus daily. He has been able to maintain his weight for about 4-5 weeks, which is favorable. He understands that in the next 2 weeks he may not feel a lot of improvement, he was encouraged to continue with all ONS routines, oral hygiene routines and hydration. He was provided with this clinicians contact information, as well as contact for Dr. Aly Lopez office if he feels weak and/or deficient in fluids, so that he can be setup and avoid any hospitalization. However, if he feels bad, encouraged ED. Weight change: stable x 1mo, hx 45# weight loss in 5mo (23%)  Appetite: fair  N/V/D/C: none  Calculated Needs if applicable: [id number] (68x30-32), 88-95gm PRO (68x1.3), 2200ml (68x32)       Pre-Hab Eligible?:         Recommendations:No PEG. Continue PO intake of Ensure Plus 3 times daily providing 1050kcal, 40gm PRO; Additional puree/blended/soft high calorie meals to meet remaining needs.           ASPEN GUIDELINES FOR CLINICAL CHARACTERISTICS OF MALNUTRITION IN CHRONIC ILLNESS     Moderate Malnutrition  Severe Malnutrition    Energy intake  <75% energy intake compared to estimated needs for >1month <75% energy intake compared to estimated needs for >1month   Weight changes  5% x 1 month  7.5% x 3 months   10% x 6 months   20% x 1 year  >5% x 1 month  >7.5% x 3 months   >10% x 6 months   >20% x 1 year    Physical findings  Mild   Decrease subcutaneous fat    Decrease muscle mass     Increase fluid/edema   Severe  Decrease subcutaneous fat    Decrease muscle mass     Increase fluid/edema      Severe malnutrition evidenced by 23% weight loss in 5mo, <75% intake x1-2mo, refusal of PEG, chemoradiation.      Miguel Dhaliwal, RD, LD

## 2021-12-07 ENCOUNTER — APPOINTMENT (OUTPATIENT)
Dept: RADIATION ONCOLOGY | Age: 66
End: 2021-12-07
Attending: SPECIALIST
Payer: MEDICARE

## 2021-12-07 ENCOUNTER — TELEPHONE (OUTPATIENT)
Dept: INFUSION THERAPY | Age: 66
End: 2021-12-07

## 2021-12-07 PROBLEM — T45.1X5A MOUTH SORE SECONDARY TO CHEMOTHERAPY: Status: ACTIVE | Noted: 2021-12-07

## 2021-12-07 PROBLEM — K13.79 MOUTH SORE SECONDARY TO CHEMOTHERAPY: Status: ACTIVE | Noted: 2021-12-07

## 2021-12-07 NOTE — TELEPHONE ENCOUNTER
Received notification today from [address] [place] [address] that a PA was required for [employer]. I called [facility] [facility] to inquire further and spoke to Esteban Pagan. Esteban Pagan stated that all 3 medications required a PA (Benadryl, Malox, and viscous Lidocaine 2%. I called [facility] to initiate the PA at [phone] opt 1. I spoke to [address] [place] [address] who stated that the Malox was covered and Benadryl was over the counter, and that just the Lidocaine needed an authorization. Answered PA questions with case reference number [id number]. [address] [place] [address] stated that we would be notified via fax if approved within 72 hours.

## 2021-12-08 ENCOUNTER — HOSPITAL ENCOUNTER (OUTPATIENT)
Dept: INFUSION THERAPY | Age: 66
End: 2021-12-08

## 2021-12-09 ENCOUNTER — TELEPHONE (OUTPATIENT)
Dept: INFUSION THERAPY | Age: 66
End: 2021-12-09

## 2021-12-09 NOTE — TELEPHONE ENCOUNTER
Received notification and spoke to Farhana Sandoval, at [facility], that magic mouth wash is approved by [employer]. Farhana Sandovla ran the claim and it was paid for $3.70.

## 2021-12-14 ENCOUNTER — HOSPITAL ENCOUNTER (OUTPATIENT)
Dept: INFUSION THERAPY | Age: 66
Discharge: HOME | End: 2021-12-14
Payer: MEDICARE

## 2021-12-14 ENCOUNTER — OFFICE VISIT (OUTPATIENT)
Dept: ONCOLOGY | Age: 66
End: 2021-12-14
Payer: MEDICARE

## 2021-12-14 VITALS
SYSTOLIC BLOOD PRESSURE: 148 MMHG | TEMPERATURE: 98.1 F | HEIGHT: 67 IN | OXYGEN SATURATION: 97 % | WEIGHT: 140 LBS | DIASTOLIC BLOOD PRESSURE: 62 MMHG | BODY MASS INDEX: 21.97 KG/M2 | HEART RATE: 86 BPM

## 2021-12-14 DIAGNOSIS — C76.0 PRIMARY SQUAMOUS CELL CARCINOMA OF HEAD AND NECK (HCC): ICD-10-CM

## 2021-12-14 DIAGNOSIS — C76.0 PRIMARY SQUAMOUS CELL CARCINOMA OF HEAD AND NECK (HCC): Primary | ICD-10-CM

## 2021-12-14 LAB
ALBUMIN SERPL-MCNC: 4.3 G/DL (ref 3.5–5.2)
ALP BLD-CCNC: 85 U/L (ref 40–129)
ALT SERPL-CCNC: 5 U/L (ref 0–40)
ANION GAP SERPL CALCULATED.3IONS-SCNC: 10 MMOL/L (ref 7–16)
ANISOCYTOSIS: ABNORMAL
AST SERPL-CCNC: 13 U/L (ref 0–39)
BASOPHILS ABSOLUTE: 0 E9/L (ref 0–0.2)
BASOPHILS RELATIVE PERCENT: 0.3 % (ref 0–2)
BILIRUB SERPL-MCNC: 0.3 MG/DL (ref 0–1.2)
BUN BLDV-MCNC: 11 MG/DL (ref 6–23)
CALCIUM SERPL-MCNC: 10.5 MG/DL (ref 8.6–10.2)
CHLORIDE BLD-SCNC: 99 MMOL/L (ref 98–107)
CO2: 28 MMOL/L (ref 22–29)
CREAT SERPL-MCNC: 1.2 MG/DL (ref 0.7–1.2)
EOSINOPHILS ABSOLUTE: 0 E9/L (ref 0.05–0.5)
EOSINOPHILS RELATIVE PERCENT: 0.7 % (ref 0–6)
GFR AFRICAN AMERICAN: >60
GFR NON-AFRICAN AMERICAN: >60 ML/MIN/1.73
GLUCOSE BLD-MCNC: 139 MG/DL (ref 74–99)
HCT VFR BLD CALC: 32.3 % (ref 37–54)
HEMOGLOBIN: 10.6 G/DL (ref 12.5–16.5)
HYPOCHROMIA: ABNORMAL
LYMPHOCYTES ABSOLUTE: 0.18 E9/L (ref 1.5–4)
LYMPHOCYTES RELATIVE PERCENT: 2.6 % (ref 20–42)
MAGNESIUM: 2 MG/DL (ref 1.6–2.6)
MCH RBC QN AUTO: 31.4 PG (ref 26–35)
MCHC RBC AUTO-ENTMCNC: 32.8 % (ref 32–34.5)
MCV RBC AUTO: 95.6 FL (ref 80–99.9)
MONOCYTES ABSOLUTE: 0.43 E9/L (ref 0.1–0.95)
MONOCYTES RELATIVE PERCENT: 6.9 % (ref 2–12)
MYELOCYTE PERCENT: 0.9 % (ref 0–0)
NEUTROPHILS ABSOLUTE: 5.55 E9/L (ref 1.8–7.3)
NEUTROPHILS RELATIVE PERCENT: 89.6 % (ref 43–80)
OVALOCYTES: ABNORMAL
PDW BLD-RTO: 16.3 FL (ref 11.5–15)
PLATELET # BLD: 116 E9/L (ref 130–450)
PMV BLD AUTO: 11.2 FL (ref 7–12)
POIKILOCYTES: ABNORMAL
POLYCHROMASIA: ABNORMAL
POTASSIUM SERPL-SCNC: 4.4 MMOL/L (ref 3.5–5)
RBC # BLD: 3.38 E12/L (ref 3.8–5.8)
SODIUM BLD-SCNC: 137 MMOL/L (ref 132–146)
TOTAL PROTEIN: 7.7 G/DL (ref 6.4–8.3)
WBC # BLD: 6.1 E9/L (ref 4.5–11.5)

## 2021-12-14 PROCEDURE — 99212 OFFICE O/P EST SF 10 MIN: CPT

## 2021-12-14 PROCEDURE — 36415 COLL VENOUS BLD VENIPUNCTURE: CPT

## 2021-12-14 PROCEDURE — 80053 COMPREHEN METABOLIC PANEL: CPT

## 2021-12-14 PROCEDURE — 99214 OFFICE O/P EST MOD 30 MIN: CPT | Performed by: INTERNAL MEDICINE

## 2021-12-14 PROCEDURE — 85025 COMPLETE CBC W/AUTO DIFF WBC: CPT

## 2021-12-14 PROCEDURE — 83735 ASSAY OF MAGNESIUM: CPT

## 2021-12-14 NOTE — PROGRESS NOTES
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 Referring Physician:  Toya Jaramillo DO    PCP:  Chris Bowie MD    History of Present Illness:      Mr. Hernan Hoyt is a pleasant 69-year-old gentleman, with a past medical history significant for tobacco use disorder, COPD, and hepatitis C status post treatment in 2019, who had presented with left oral tongue lesion, when he developed a left neck mass, he was treated with a short course of antibiotics, which did not help, he subsequently had a work-up done, including a CT of the neck which had revealed an abnormal mass in the lateral aspect of the oral tongue involving a significant portion of the tongue measuring 4 x 2 x 2.8 cm, a level 2A lymph node was also noted, The patient underwent a laryngoscopy on 7/19/2021 which revealed a benign-appearing polyp on the left true vocal cord but a mass involving the left tonsil fossa. This was biopsied and found to be a invasive squamous cell carcinoma p16-positive. PET/CT was done on 8/3/2021, revealing a masslike structure involving the left tongue extending to the posterior pharyngeal wall crossing the midline. There was also metabolic activity noted in a zone 3 node on the ipsilateral side. The patient started on 10/12/2021 on concurrent chemoradiation using weekly cisplatin. He denies any nausea or vomiting, he has mouth pain, the symptoms related to the cancer had improved, the patient completed infection develop on 6/20/2021, he has been noncompliant with his visits and treatments. Review of Systems;  CONSTITUTIONAL: No fever, chills. Decreased appetite and energy level. Positive for weight loss. ENMT: Eyes: No diplopia; Nose: No epistaxis. Mouth: pos for mouth pain, odynophagia and dysphagia.  Pos for hoarseness of the voice. RESPIRATORY: No hemoptysis, positive for mild chronic shortness of breath, cough. CARDIOVASCULAR: No chest pain, palpitations. GASTROINTESTINAL: No nausea/vomiting, abdominal pain, diarrhea/constipation. GENITOURINARY: No dysuria, urinary frequency, hematuria. NEURO: No syncope, presyncope, headache. Remainder:  ROS NEGATIVE    Past Medical History:      Diagnosis Date    Cancer [facility]     Tongue    COPD (chronic obstructive pulmonary disease) ([facility]     Edentulous     History of hepatitis C 2019    treated-2019    Hoarseness of voice     For OR 7-12-21    Mesothelioma ([facility] [facility]     Mouth sores     Tongue mass     left     Patient Active Problem List   Diagnosis    COPD (chronic obstructive pulmonary disease) ([facility] [facility]    Lumbar radiculopathy    Hepatitis C without hepatic coma    Tongue mass    Primary squamous cell carcinoma of head and neck (HCC)    Mouth sore secondary to chemotherapy        Past Surgical History:      Procedure Laterality Date    FRACTURE SURGERY      KNEE ARTHROSCOPY Left 1980    LARYNGOSCOPY N/A 7/12/2021    PANENDOSCOPY BRONCHOSCOPY ESOPHAGOSCOPY MICROSUSPENSION DIRECT LARYNGOSCOPY performed by Lino Pal DO at [facility] N/A 7/12/2021    BIOPSY OF TONGUE BASE WITH FROZEN SECTION AND LEFT TRUE VOCAL CORD BIOPSY performed by Lino Pal DO at [facility] OR       Family History:  Family History   Problem Relation Age of Onset    Cancer Father         lung       Medications:  Reviewed and reconciled.     Social History:  Social History     Socioeconomic History    Marital status: Single     Spouse name: Not on file    Number of children: 0    Years of education: Not on file    Highest education level: Not on file   Occupational History    Occupation: dairy compressor   Tobacco Use    Smoking status: Current Every Day Smoker     Packs/day: 0.50     Years: 45.00     Pack years: 22.50     Types: Cigarettes    Smokeless tobacco: Never Used   Vaping Use    Vaping Use: Never used   Substance and Sexual Activity    Alcohol use: Not Currently    Drug use: Never    Sexual activity: Not on file   Other Topics Concern    Not on file   Social History Narrative    Not on file     Social Determinants of Health     Financial Resource Strain:     Difficulty of Paying Living Expenses: Not on file   Food Insecurity:     Worried About Running Out of Food in the Last Year: Not on file    Harsh of Food in the Last Year: Not on file   Transportation Needs:     Lack of Transportation (Medical): Not on file    Lack of Transportation (Non-Medical): Not on file   Physical Activity:     Days of Exercise per Week: Not on file    Minutes of Exercise per Session: Not on file   Stress:     Feeling of Stress : Not on file   Social Connections:     Frequency of Communication with Friends and Family: Not on file    Frequency of Social Gatherings with Friends and Family: Not on file    Attends Restoration Services: Not on file    Active Member of [address] [place] [address] or Organizations: Not on file    Attends Club or Organization Meetings: Not on file    Marital Status: Not on file   Intimate Partner Violence:     Fear of Current or Ex-Partner: Not on file    Emotionally Abused: Not on file    Physically Abused: Not on file    Sexually Abused: Not on file   Housing Stability:     Unable to Pay for Housing in the Last Year: Not on file    Number of [place] in the Last Year: Not on file    Unstable Housing in the Last Year: Not on file       Allergies:  No Known Allergies    Physical Exam:  BP (!) 148/62   Pulse 86   Temp 98.1 °F (36.7 °C)   Ht 5' 7\" (1.702 m)   Wt 140 lb (63.5 kg)   SpO2 97%   BMI 21.93 kg/m²     GENERAL: Alert, oriented x 3, not in acute distress. HEENT: PERRLA; EOMI. Positive for trismus, improved motion of the tongue. NECK: Supple. Positive for radiation dermatitis. He has fullness of the left neck.   LUNGS: Good air entry bilaterally. No wheezing, crackles or rhonchi. CARDIOVASCULAR: Regular rate. No murmurs, rubs or gallops. ABDOMEN: Soft. Non-tender, non-distended. Positive bowel sounds. EXTREMITIES: Without clubbing, cyanosis, or edema. NEUROLOGIC: No focal deficits. ECOG PS 1    Impression/Plan:       Mr. Davina Cabrera is a pleasant 59-year-old gentleman, with a past medical history significant for tobacco use disorder, COPD, and hepatitis C status post treatment in 2019, who had presented with left oral tongue lesion, when he developed a left neck mass, he was treated with a short course of antibiotics, which did not help, he subsequently had a work-up done, including a CT of the neck which had revealed an abnormal mass in the lateral aspect of the oral tongue involving a significant portion of the tongue measuring 4 x 2 x 2.8 cm, a level 2A lymph node was also noted, The patient underwent a laryngoscopy on 7/19/2021 which revealed a benign-appearing polyp on the left true vocal cord but a mass involving the left tonsil fossa. This was biopsied and found to be a invasive squamous cell carcinoma p16-positive. PET/CT was done on 8/3/2021, revealing a masslike structure involving the left tongue extending to the posterior pharyngeal wall crossing the midline. There was also metabolic activity noted in a zone 3 node on the ipsilateral side, clinical stage [id number] disease. I discussed with the patient his diagnosis, prognosis, as well as treatment with definitive concurrent chemo/RT using weekly cisplatin, the side effects and the schedule of the treatment were reviewed with the patient. The patient had decided against having a PEG and port placed, referral was placed to palliative medicine for symptoms management. The patient was started on 10/12/2021 on concurrent chemoradiation using weekly cisplatin.  He had received 6 cycles to date, he completed radiation therapy on 5/6/2021, he has been noncompliant with his visits and treatments, today 12/14/2021, the patient is doing well clinically, labs reviewed, proceed with the last chemotherapy, cisplatin, cycle #6. Patient had a PET scan done in about 3 months. Follow-up with ENT. RTC in 1 month. Thank you for allowing us to participate in the care of Mr. Saul Tejeda.     Elissa Clarke MD   HEMATOLOGY/MEDICAL ONCOLOGY  [address] [place] [address] ONCOLOGY  [facility] [address] [facility] [address]  Dept: [phone]

## 2021-12-15 ENCOUNTER — HOSPITAL ENCOUNTER (OUTPATIENT)
Dept: INFUSION THERAPY | Age: 66
Discharge: HOME | End: 2021-12-15
Payer: MEDICARE

## 2021-12-15 VITALS
TEMPERATURE: 97.2 F | HEART RATE: 52 BPM | SYSTOLIC BLOOD PRESSURE: 133 MMHG | DIASTOLIC BLOOD PRESSURE: 60 MMHG | RESPIRATION RATE: 16 BRPM

## 2021-12-15 DIAGNOSIS — C76.0 PRIMARY SQUAMOUS CELL CARCINOMA OF HEAD AND NECK (HCC): Primary | ICD-10-CM

## 2021-12-15 PROCEDURE — 2580000003 HC RX 258: Performed by: INTERNAL MEDICINE

## 2021-12-15 PROCEDURE — 96413 CHEMO IV INFUSION 1 HR: CPT

## 2021-12-15 PROCEDURE — 96366 THER/PROPH/DIAG IV INF ADDON: CPT

## 2021-12-15 PROCEDURE — 6360000002 HC RX W HCPCS: Performed by: INTERNAL MEDICINE

## 2021-12-15 PROCEDURE — 96415 CHEMO IV INFUSION ADDL HR: CPT

## 2021-12-15 PROCEDURE — 96367 TX/PROPH/DG ADDL SEQ IV INF: CPT

## 2021-12-15 PROCEDURE — 96375 TX/PRO/DX INJ NEW DRUG ADDON: CPT

## 2021-12-15 RX ORDER — DIPHENHYDRAMINE HYDROCHLORIDE 50 MG/ML
50 INJECTION INTRAMUSCULAR; INTRAVENOUS ONCE
Status: CANCELLED | OUTPATIENT
Start: 2021-12-15 | End: 2021-12-15

## 2021-12-15 RX ORDER — DEXAMETHASONE SODIUM PHOSPHATE 10 MG/ML
10 INJECTION, SOLUTION INTRAMUSCULAR; INTRAVENOUS ONCE
Status: COMPLETED | OUTPATIENT
Start: 2021-12-15 | End: 2021-12-15

## 2021-12-15 RX ORDER — SODIUM CHLORIDE 9 MG/ML
INJECTION, SOLUTION INTRAVENOUS CONTINUOUS
Status: CANCELLED | OUTPATIENT
Start: 2021-12-15

## 2021-12-15 RX ORDER — SODIUM CHLORIDE 9 MG/ML
20 INJECTION, SOLUTION INTRAVENOUS ONCE
Status: DISCONTINUED | OUTPATIENT
Start: 2021-12-15 | End: 2021-12-16 | Stop reason: HOSPADM

## 2021-12-15 RX ORDER — HEPARIN SODIUM (PORCINE) LOCK FLUSH IV SOLN 100 UNIT/ML 100 UNIT/ML
500 SOLUTION INTRAVENOUS PRN
Status: CANCELLED | OUTPATIENT
Start: 2021-12-15

## 2021-12-15 RX ORDER — SODIUM CHLORIDE 0.9 % (FLUSH) 0.9 %
5-40 SYRINGE (ML) INJECTION PRN
Status: CANCELLED | OUTPATIENT
Start: 2021-12-15

## 2021-12-15 RX ORDER — DEXAMETHASONE SODIUM PHOSPHATE 10 MG/ML
10 INJECTION, SOLUTION INTRAMUSCULAR; INTRAVENOUS ONCE
Status: CANCELLED | OUTPATIENT
Start: 2021-12-15

## 2021-12-15 RX ORDER — SODIUM CHLORIDE 0.9 % (FLUSH) 0.9 %
5-40 SYRINGE (ML) INJECTION PRN
Status: DISCONTINUED | OUTPATIENT
Start: 2021-12-15 | End: 2021-12-16 | Stop reason: HOSPADM

## 2021-12-15 RX ORDER — EPINEPHRINE 1 MG/ML
0.3 INJECTION, SOLUTION, CONCENTRATE INTRAVENOUS PRN
Status: CANCELLED | OUTPATIENT
Start: 2021-12-15

## 2021-12-15 RX ORDER — SODIUM CHLORIDE 9 MG/ML
20 INJECTION, SOLUTION INTRAVENOUS ONCE
Status: CANCELLED | OUTPATIENT
Start: 2021-12-15 | End: 2021-12-15

## 2021-12-15 RX ORDER — PALONOSETRON HYDROCHLORIDE 0.05 MG/ML
0.25 INJECTION, SOLUTION INTRAVENOUS ONCE
Status: CANCELLED | OUTPATIENT
Start: 2021-12-15

## 2021-12-15 RX ORDER — SODIUM CHLORIDE 9 MG/ML
25 INJECTION, SOLUTION INTRAVENOUS PRN
Status: CANCELLED | OUTPATIENT
Start: 2021-12-15

## 2021-12-15 RX ORDER — METHYLPREDNISOLONE SODIUM SUCCINATE 125 MG/2ML
125 INJECTION, POWDER, LYOPHILIZED, FOR SOLUTION INTRAMUSCULAR; INTRAVENOUS ONCE
Status: CANCELLED | OUTPATIENT
Start: 2021-12-15 | End: 2021-12-15

## 2021-12-15 RX ORDER — PALONOSETRON HYDROCHLORIDE 0.05 MG/ML
0.25 INJECTION, SOLUTION INTRAVENOUS ONCE
Status: COMPLETED | OUTPATIENT
Start: 2021-12-15 | End: 2021-12-15

## 2021-12-15 RX ADMIN — SODIUM CHLORIDE 20 ML/HR: 9 INJECTION, SOLUTION INTRAVENOUS at 09:29

## 2021-12-15 RX ADMIN — DEXAMETHASONE SODIUM PHOSPHATE 10 MG: 10 INJECTION, SOLUTION INTRAMUSCULAR; INTRAVENOUS at 09:32

## 2021-12-15 RX ADMIN — PALONOSETRON 0.25 MG: 0.25 INJECTION, SOLUTION INTRAVENOUS at 09:32

## 2021-12-15 RX ADMIN — SODIUM CHLORIDE, PRESERVATIVE FREE 10 ML: 5 INJECTION INTRAVENOUS at 09:39

## 2021-12-15 RX ADMIN — POTASSIUM CHLORIDE: 2 INJECTION, SOLUTION, CONCENTRATE INTRAVENOUS at 10:38

## 2021-12-15 RX ADMIN — FOSAPREPITANT 150 MG: 150 INJECTION, POWDER, LYOPHILIZED, FOR SOLUTION INTRAVENOUS at 09:55

## 2021-12-21 ENCOUNTER — OFFICE VISIT (OUTPATIENT)
Dept: ENT CLINIC | Age: 66
End: 2021-12-21
Payer: MEDICARE

## 2021-12-21 VITALS — WEIGHT: 140 LBS | TEMPERATURE: 97.5 F | HEIGHT: 67 IN | BODY MASS INDEX: 21.97 KG/M2

## 2021-12-21 DIAGNOSIS — C02.9 TONGUE CANCER (HCC): Primary | ICD-10-CM

## 2021-12-21 PROCEDURE — 99213 OFFICE O/P EST LOW 20 MIN: CPT | Performed by: OTOLARYNGOLOGY

## 2021-12-21 ASSESSMENT — ENCOUNTER SYMPTOMS
EYES NEGATIVE: 1
EYE PAIN: 0
ABDOMINAL PAIN: 0
EYE DISCHARGE: 0
DIARRHEA: 0
RESPIRATORY NEGATIVE: 1
SORE THROAT: 1
GASTROINTESTINAL NEGATIVE: 1
CHEST TIGHTNESS: 0
COLOR CHANGE: 0
SHORTNESS OF BREATH: 0
VOMITING: 0
APNEA: 0

## 2021-12-21 NOTE — PROGRESS NOTES
Subjective:      Patient ID:  Shady Ku is a 77 y.o. male. HPI:    Patient presents today for recheck of [id number] HPV+ oral cavity. Patient started on 10/12/2021 on concurrent chemoradiation using weekly cisplatin. Chemotherapy was held last week due to cytopenias, is currently on cycle #5 of cisplatin. Swallow well. Weight stable. Still smoking, but less. 12/21/2021:   Pt returns today for recheck of known oral cavity cancer. Patient had recent appointments with TIFFANIE Wiseman and Dr. Tracy Jefferson. Per notes he appears to have more chemo/xrt treatments scheduled. Reports he is doing well. Continues to smoke.        Past Medical History:   Diagnosis Date    Cancer [facility])     Tongue    COPD (chronic obstructive [facility] disease) ([facility] [address]     Edentulous     History of hepatitis C 2019    treated-2019    Hoarseness of voice     For OR 7-12-21    Mesothelioma ([facility] [address]     Mouth sores     Tongue mass     left     Past Surgical History:   Procedure Laterality Date    FRACTURE SURGERY      KNEE ARTHROSCOPY Left 1980    LARYNGOSCOPY N/A 7/12/2021    PANENDOSCOPY BRONCHOSCOPY ESOPHAGOSCOPY MICROSUSPENSION DIRECT LARYNGOSCOPY performed by Laveta Soulier, DO at [facility] N/A 7/12/2021    BIOPSY OF TONGUE BASE WITH FROZEN SECTION AND LEFT TRUE VOCAL CORD BIOPSY performed by Laveta Soulier, DO at [facility] OR     Family History   Problem Relation Age of Onset    Cancer Father         lung     Social History     Socioeconomic History    Marital status: Single     Spouse name: None    Number of children: 0    Years of education: None    Highest education level: None   Occupational History    Occupation: dairy compressor   Tobacco Use    Smoking status: Current Every Day Smoker     Packs/day: 0.50     Years: 45.00     Pack years: 22.50     Types: Cigarettes    Smokeless tobacco: Never Used   Vaping Use    Vaping Use: Never used   Substance and Sexual Activity    Alcohol use: Not Currently    Drug use: Never    Sexual activity: None   Other Topics Concern    None   Social History Narrative    None     Social Determinants of Health     Financial Resource Strain:     Difficulty of Paying Living Expenses: Not on file   Food Insecurity:     Worried About Running Out of Food in the Last Year: Not on file    Harsh of Food in the Last Year: Not on file   Transportation Needs:     Lack of Transportation (Medical): Not on file    Lack of Transportation (Non-Medical): Not on file   Physical Activity:     Days of Exercise per Week: Not on file    Minutes of Exercise per Session: Not on file   Stress:     Feeling of Stress : Not on file   Social Connections:     Frequency of Communication with Friends and Family: Not on file    Frequency of Social Gatherings with Friends and Family: Not on file    Attends Scientologist Services: Not on file    Active Member of Clubs or Organizations: Not on file    Attends Club or Organization Meetings: Not on file    Marital Status: Not on file   Intimate Partner Violence:     Fear of Current or Ex-Partner: Not on file    Emotionally Abused: Not on file    Physically Abused: Not on file    Sexually Abused: Not on file   Housing Stability:     Unable to Pay for Housing in the Last Year: Not on file    Number of [place] in the Last Year: Not on file    Unstable Housing in the Last Year: Not on file     No Known Allergies    Review of Systems   Constitutional: Negative. Negative for appetite change. HENT: Positive for mouth sores and sore throat. Eyes: Negative. Negative for pain, discharge and visual disturbance. Respiratory: Negative. Negative for apnea, chest tightness and shortness of breath. Cardiovascular: Negative. Negative for chest pain, palpitations and leg swelling. Gastrointestinal: Negative. Negative for abdominal pain, diarrhea and vomiting.    Endocrine: Negative for cold intolerance, heat intolerance and polydipsia. Genitourinary: Negative. Negative for dysuria, flank pain and hematuria. Musculoskeletal: Negative. Negative for arthralgias, gait problem and neck pain. Skin: Negative. Negative for color change, pallor and rash. Allergic/Immunologic: Negative for environmental allergies, food allergies and immunocompromised state. Neurological: Negative. Negative for dizziness, numbness and headaches. Hematological: Positive for adenopathy. Psychiatric/Behavioral: Negative. Negative for behavioral problems and hallucinations. All other systems reviewed and are negative. Objective:     Vitals:    12/21/21 1554   Temp: 97.5 °F (36.4 °C)     Physical Exam  Vitals and nursing note reviewed. Constitutional:       Appearance: He is well-developed. HENT:      Head: Normocephalic and atraumatic. Right Ear: Hearing, tympanic membrane, ear canal and external ear normal.      Left Ear: Hearing, tympanic membrane, ear canal and external ear normal.      Nose: Septal deviation present. Comments: Severe DNS right 90%    Level ii lymphadenopathy left neck     Mouth/Throat:      Lips: Pink. Mouth: Mucous membranes are moist.      Dentition: Normal dentition. No dental tenderness, gingival swelling, dental caries, dental abscesses or gum lesions. Tongue: Lesions present. Comments: Tongue lesion appears to be decreasing in size, less firm. Pt with improved opening of the mandible   Eyes:      Conjunctiva/sclera: Conjunctivae normal.      Pupils: Pupils are equal, round, and reactive to light. Neck:     Cardiovascular:      Rate and Rhythm: Normal rate and regular rhythm. Heart sounds: Normal heart sounds. Pulmonary:      Effort: Pulmonary effort is normal.      Breath sounds: Normal breath sounds. Abdominal:      General: Bowel sounds are normal.      Palpations: Abdomen is soft. Musculoskeletal:      Cervical back: Normal range of motion and neck supple. Skin:     General: Skin is warm and dry. Neurological:      Mental Status: He is alert and oriented to person, place, and time. Assessment:       Diagnosis Orders   1. Tongue cancer ([facility] [address]           Plan:      Tumor seems to be responding to treatments. Strongly encouraged to attend all doctors appointments. Will determine need for repeat PET scan after follow up with Richard Wiseman and Francisco. Follow up in 1 month(s)               Nicole Blackwood  1955    I have discussed the case, including pertinent history and exam findings with the resident. I have seen and examined the patient and the key elements of the encounter have been performed by me. I agree with the assessment, plan and orders as documented by the  resident              Remainder of medical problems as per  resident note. Patient seen and examined. Agree with above exam, assessment and plan.       Electronically signed by Lyn Cardenas DO on 12/27/21 at 12:23 PM EST

## 2021-12-30 ENCOUNTER — HOSPITAL ENCOUNTER (OUTPATIENT)
Dept: RADIATION ONCOLOGY | Age: 66
Discharge: HOME | End: 2021-12-30
Attending: SPECIALIST
Payer: MEDICARE

## 2021-12-30 VITALS
DIASTOLIC BLOOD PRESSURE: 84 MMHG | OXYGEN SATURATION: 95 % | SYSTOLIC BLOOD PRESSURE: 138 MMHG | RESPIRATION RATE: 18 BRPM | HEART RATE: 122 BPM | WEIGHT: 133.1 LBS | TEMPERATURE: 98.1 F | BODY MASS INDEX: 20.85 KG/M2

## 2021-12-30 DIAGNOSIS — C76.0 PRIMARY SQUAMOUS CELL CARCINOMA OF HEAD AND NECK (HCC): Primary | ICD-10-CM

## 2021-12-30 PROCEDURE — 99999 PR OFFICE/OUTPT VISIT,PROCEDURE ONLY: CPT | Performed by: NURSE PRACTITIONER

## 2021-12-30 RX ORDER — FLUCONAZOLE 100 MG/1
100 TABLET ORAL DAILY
Qty: 28 TABLET | Refills: 0 | Status: SHIPPED | OUTPATIENT
Start: 2021-12-30 | End: 2022-01-27

## 2021-12-30 ASSESSMENT — PAIN SCALES - GENERAL: PAINLEVEL_OUTOF10: 9

## 2021-12-30 ASSESSMENT — PAIN DESCRIPTION - LOCATION: LOCATION: JAW

## 2021-12-30 NOTE — PROGRESS NOTES
RADIATION ONCOLOGY  3 week follow up         12/30/2021      NAME:  Lynette Cash OF BIRTH:  1955    CC: Pt returns today for re-assessment of radiation treatment area. HPI: Sammy Antunez is a pleasant 77 y.o. male with a diagnosis of [id number] SCC left oral tongue, s/p concurrent chemoRT (patient was noncompliant with chemo treatments and did not come for 1 consectutive XRT appt). The patient underwent a course of radiation therapy to the tumor site, which was completed on 12/6/21. He completed 7400 cGy in 37 fractions, see chart below for details. Site Start TX Last [address] [place] 153 ED Fractions Dose (cGy) Fx Dose (cGy) Technique   Left oral tongue & bilateral manuela-neck 10/12/2021 11/16/2021 35 25 5000 200 VMAT   Left oral tongue & bilateral manuela-neck [place] 11/17/2021 11/22/2021 5 5 1000 200 VMAT   Left oral tongue & ipsilateral manuela-neck [place] 11/24/2021 12/6/2021 12 7 1400 200 VMAT   TOTAL 10/12/2021 12/6/2021 55 37 7400          States that it hurts to swallow. His is taking in 2 Boost daily and 3 eggs. He drinks water as well and states this is all of his nutrition throughout the day. Uses Biotene to rinse his mouth out. He uses the salt water rinses occasionally as well. Continues to smoke 0.5 PPD. States that he has cut back from 2 PPD and plans to cut back more. States that he used to drink a pint of Live Gamer with 2 beers daily. He has remained alcohol free per his report for 5 mos. Pt is following with Dr Faby Sims for medical oncology. Next appt 1/11/22. Pt is following with Dr Jenni Zhu for ENT. Next appt 1/26/22. Pain: 9/10 to left throat, taking Magic Mouthwash to help with this. Past medical, surgical, social and family histories reviewed and updated as indicated. ALLERGIES:  Patient has no known allergies.          Current Outpatient Medications   Medication Sig Dispense Refill    fluconazole (DIFLUCAN) 100 MG tablet Take 1 tablet by 1000 200 VMAT   Left oral tongue & ipsilateral manuela-neck [place] 11/24/2021 12/6/2021 12 7 1400 200 VMAT   TOTAL 10/12/2021 12/6/2021 55 37 7400            Patient is doing fair post-radiation completion. Skin care and sun care reviewed. Signs and symptoms of recurrence reviewed. Thrush: Janet Hi noted to oral mucosa. Ordered Diflucan 100 mg PO daily x 4 weeks. Encouraged good oral hygiene. Uses Biotene to rinse his mouth out. He uses the salt water rinses occasionally as well. Encouraged salt water rinses multiple times throughout day. Nutrition: His is taking in 2 Boost daily and 3 eggs. He drinks water as well and states this is all of his nutrition throughout the day. He is down ~10 lbs since last radiation treatment. Encouraged increased nutritional intake. Will update Ferny Paige RD LD to touch base with patient to review nutritional intake. Smoking Cessation: Continues to smoke 0.5 PPD. States that he has cut back from 2 PPD and plans to cut back more. Encouraged smoking cessation. Alcohol Cessation: States that he used to drink a pint of Odanah-Jen with 2 beers daily. He has remained alcohol free per his report for 5 mos. Encouraged continued alcohol cessation. Cont to follow with Dr Beverly Baker for medical oncology. Next appt 1/11/22. Cont to follow with Dr Fariha Cardoso for ENT. Next appt 1/26/22. I discussed follow up plans with Keyanna Little. At this time, I will see the patient back in 1 month for a post-radiation completion follow-up visit/ thrush check. Keyanna Little is to follow up with other providers involved in their care as directed (including but not limited to Medical Oncology, Primary Care, Pulmonary, and Surgery).      The patient was given our contact number in the event that if at any time they change their mind and would like to return to the clinic to see either myself or one of the Radiation Oncologists, they can simply call us and we would be happy to see them sooner. Thank you for involving us in the management of this extremely pleasant patient. More than 15 min was in direct contact with pt coordinating/giving care. >50% of the visit was spent in counseling the pt on the following: Follow up care    The nurses notes were reviewed and incorporated into this assessment and plan. Questions answered to apparent satisfaction.       Terrie Cowart, MSN, RN, APRN-CNP  Nurse Practitioner for Radiation Oncology    PHYSICIANS [facility]) [facility]: [phone] (GOL: [phone])  [address] [place] [address]: [phone] (WJA: [phone])  [facility]:  [phone] (SCK:  [phone])

## 2021-12-30 NOTE — PROGRESS NOTES
Tylor Andre  12/30/2021  9:05 AM      Vitals:    12/30/21 0903   BP: 138/84   Pulse: 122   Resp: 18   Temp: 98.1 °F (36.7 °C)   SpO2: 95%    : Wt Readings from Last 3 Encounters:   12/30/21 135 lb 8 oz (61.5 kg)   12/21/21 140 lb (63.5 kg)   12/14/21 140 lb (63.5 kg)                Current Outpatient Medications:     Magic Mouthwash (MIRACLE MOUTHWASH), Swish and spit 5 mLs 4 times daily as needed for Irritation, Disp: 480 mL, Rfl: 1    ibuprofen (ADVIL;MOTRIN) 600 MG tablet, , Disp: , Rfl:     ondansetron (ZOFRAN) 8 MG tablet, Take 1 tablet by mouth every 12 hours as needed for Nausea or Vomiting, Disp: 30 tablet, Rfl: 1    prochlorperazine (COMPAZINE) 10 MG tablet, Take 1 tablet by mouth every 6 hours as needed (nausea, vomiting), Disp: 30 tablet, Rfl: 2    oxyCODONE-acetaminophen (PERCOCET) 7.5-325 MG per tablet, Take 1 tablet by mouth every 8 hours as needed. , Disp: , Rfl:     diazePAM (VALIUM) 5 MG tablet, Take 5 mg by mouth 2 times daily. , Disp: , Rfl:     albuterol sulfate  (90 Base) MCG/ACT inhaler, INHALE 2 PUFFS INTO THE LUNGS EVERY 6 HOURS AS NEEDED FOR WHEEZING, Disp: 54 g, Rfl: 3    ADVAIR DISKUS 250-50 MCG/DOSE AEPB, Inhale 1 puff into the lungs 2 times daily , Disp: , Rfl:       Patient is seen today in follow up with Emilia. Pt received RT 37fx/7400cGY and completed on 12/6/2021. He is continuing to follow with medical oncology. He is not able to eat much but attempting, nausea from chemo. FALLS RISK SCREENING ASSESSMENT    Instructions:  Assess the patient and enter the appropriate indicators that are present for fall risk identification. Total the numbers entered and assign a fall risk score from Table 2.  Reassess patient at a minimum every 12 weeks or with status change. Assessment   Date  12/30/2021     1. Mental Ability: confusion/cognitively impaired Yes - 3       2. Elimination Issues: incontinence, frequency No - 0       3.   Ambulatory: use of Level III) placed on patient chart           MALNUTRITION RISK SCREENING ASSESSMENT    12/30/2021   Patient:  Nirmala Driver  Sex:  male    Instructions:  Assess the patient and enter the appropriate indicators that are present for nutrition risk identification. Total the numbers entered and assign a risk score. Follow the appropriate action for total score listed below. Assessment   Date  12/30/2021     1. Have you lost weight without trying? 2- Unsure     2. Have you been eating poorly because of a decreased appetite? 2- Yes   3. Do you have a diagnosis of head and neck cancer?       2- Yes                                                                                    TOTAL 6          Score of 0-1: No action  Score 2 or greater:  · For Non-Diabetic Patient: Recommend adding Ensure Complete 2 x daily and provide patient with Ensure wellness bag with coupons  · For Diabetic Patient: Recommend adding Glucerna Shake 2 x daily and provide patient with Glucerna Wellness bag with coupons  · Route to the dietitian via Lanette Ritchie RN

## 2022-01-11 ENCOUNTER — OFFICE VISIT (OUTPATIENT)
Dept: ONCOLOGY | Age: 67
End: 2022-01-11
Payer: MEDICARE

## 2022-01-11 ENCOUNTER — HOSPITAL ENCOUNTER (OUTPATIENT)
Dept: INFUSION THERAPY | Age: 67
Discharge: HOME | End: 2022-01-11
Payer: MEDICARE

## 2022-01-11 VITALS
DIASTOLIC BLOOD PRESSURE: 78 MMHG | SYSTOLIC BLOOD PRESSURE: 156 MMHG | TEMPERATURE: 97.3 F | RESPIRATION RATE: 16 BRPM | HEART RATE: 75 BPM | WEIGHT: 133 LBS | OXYGEN SATURATION: 98 % | BODY MASS INDEX: 20.88 KG/M2 | HEIGHT: 67 IN

## 2022-01-11 DIAGNOSIS — C76.0 PRIMARY SQUAMOUS CELL CARCINOMA OF HEAD AND NECK (HCC): ICD-10-CM

## 2022-01-11 DIAGNOSIS — C76.0 PRIMARY SQUAMOUS CELL CARCINOMA OF HEAD AND NECK (HCC): Primary | ICD-10-CM

## 2022-01-11 LAB
ALBUMIN SERPL-MCNC: 4.4 G/DL (ref 3.5–5.2)
ALP BLD-CCNC: 82 U/L (ref 40–129)
ALT SERPL-CCNC: 7 U/L (ref 0–40)
ANION GAP SERPL CALCULATED.3IONS-SCNC: 13 MMOL/L (ref 7–16)
ANISOCYTOSIS: ABNORMAL
AST SERPL-CCNC: 11 U/L (ref 0–39)
BASOPHILS ABSOLUTE: 0.08 E9/L (ref 0–0.2)
BASOPHILS RELATIVE PERCENT: 1.8 % (ref 0–2)
BILIRUB SERPL-MCNC: 0.4 MG/DL (ref 0–1.2)
BUN BLDV-MCNC: 13 MG/DL (ref 6–23)
CALCIUM SERPL-MCNC: 10.8 MG/DL (ref 8.6–10.2)
CHLORIDE BLD-SCNC: 94 MMOL/L (ref 98–107)
CO2: 25 MMOL/L (ref 22–29)
CREAT SERPL-MCNC: 1.3 MG/DL (ref 0.7–1.2)
EOSINOPHILS ABSOLUTE: 0.12 E9/L (ref 0.05–0.5)
EOSINOPHILS RELATIVE PERCENT: 2.6 % (ref 0–6)
GFR AFRICAN AMERICAN: >60
GFR NON-AFRICAN AMERICAN: 55 ML/MIN/1.73
GLUCOSE BLD-MCNC: 116 MG/DL (ref 74–99)
HCT VFR BLD CALC: 30 % (ref 37–54)
HEMOGLOBIN: 9.9 G/DL (ref 12.5–16.5)
LYMPHOCYTES ABSOLUTE: 0.19 E9/L (ref 1.5–4)
LYMPHOCYTES RELATIVE PERCENT: 4.4 % (ref 20–42)
MAGNESIUM: 2.1 MG/DL (ref 1.6–2.6)
MCH RBC QN AUTO: 33.2 PG (ref 26–35)
MCHC RBC AUTO-ENTMCNC: 33 % (ref 32–34.5)
MCV RBC AUTO: 100.7 FL (ref 80–99.9)
MONOCYTES ABSOLUTE: 0.38 E9/L (ref 0.1–0.95)
MONOCYTES RELATIVE PERCENT: 7.9 % (ref 2–12)
NEUTROPHILS ABSOLUTE: 3.9 E9/L (ref 1.8–7.3)
NEUTROPHILS RELATIVE PERCENT: 83.3 % (ref 43–80)
OVALOCYTES: ABNORMAL
PDW BLD-RTO: 16.1 FL (ref 11.5–15)
PLATELET # BLD: 216 E9/L (ref 130–450)
PMV BLD AUTO: 10 FL (ref 7–12)
POIKILOCYTES: ABNORMAL
POTASSIUM SERPL-SCNC: 4.6 MMOL/L (ref 3.5–5)
RBC # BLD: 2.98 E12/L (ref 3.8–5.8)
SODIUM BLD-SCNC: 132 MMOL/L (ref 132–146)
STOMATOCYTES: ABNORMAL
TOTAL PROTEIN: 8.4 G/DL (ref 6.4–8.3)
WBC # BLD: 4.7 E9/L (ref 4.5–11.5)

## 2022-01-11 PROCEDURE — 83735 ASSAY OF MAGNESIUM: CPT

## 2022-01-11 PROCEDURE — 36415 COLL VENOUS BLD VENIPUNCTURE: CPT

## 2022-01-11 PROCEDURE — 99214 OFFICE O/P EST MOD 30 MIN: CPT | Performed by: INTERNAL MEDICINE

## 2022-01-11 PROCEDURE — 80053 COMPREHEN METABOLIC PANEL: CPT

## 2022-01-11 PROCEDURE — 99213 OFFICE O/P EST LOW 20 MIN: CPT

## 2022-01-11 PROCEDURE — 85025 COMPLETE CBC W/AUTO DIFF WBC: CPT

## 2022-01-11 RX ORDER — PROCHLORPERAZINE MALEATE 10 MG
10 TABLET ORAL EVERY 6 HOURS PRN
Qty: 30 TABLET | Refills: 2 | Status: SHIPPED | OUTPATIENT
Start: 2022-01-11

## 2022-01-11 NOTE — PROGRESS NOTES
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 Referring Physician:  Rafaela Higgins DO    PCP:  Marion Vásquez MD    History of Present Illness:      Mr. Mike Lr is a pleasant 59-year-old gentleman, with a past medical history significant for tobacco use disorder, COPD, and hepatitis C status post treatment in 2019, who had presented with left oral tongue lesion, when he developed a left neck mass, he was treated with a short course of antibiotics, which did not help, he subsequently had a work-up done, including a CT of the neck which had revealed an abnormal mass in the lateral aspect of the oral tongue involving a significant portion of the tongue measuring 4 x 2 x 2.8 cm, a level 2A lymph node was also noted, The patient underwent a laryngoscopy on 7/19/2021 which revealed a benign-appearing polyp on the left true vocal cord but a mass involving the left tonsil fossa. This was biopsied and found to be a invasive squamous cell carcinoma p16-positive. PET/CT was done on 8/3/2021, revealing a masslike structure involving the left tongue extending to the posterior pharyngeal wall crossing the midline. There was also metabolic activity noted in a zone 3 node on the ipsilateral side. The patient started on 10/12/2021 on concurrent chemoradiation using weekly cisplatin. He completed radiation therapy on 12/6/2021, chemotherapy on 5/15/2021. He is doing well overall, the dysphagia and odynopahgia had  improved, he still has soreness of the tongue. Review of Systems;  CONSTITUTIONAL: No fever, chills. Improved appetite and energy level, positive for weight loss  ENMT: Eyes: No diplopia; Nose: No epistaxis. Mouth: pos for mouth pain, odynophagia and dysphagia.  Pos for hoarseness of the voice. RESPIRATORY: No hemoptysis, positive for mild chronic shortness of breath, cough. CARDIOVASCULAR: No chest pain, palpitations. GASTROINTESTINAL: No nausea/vomiting, abdominal pain, diarrhea/constipation. GENITOURINARY: No dysuria, urinary frequency, hematuria. NEURO: No syncope, presyncope, headache. Remainder:  ROS NEGATIVE    Past Medical History:      Diagnosis Date    Cancer ([facility]     Tongue    COPD (chronic obstructive pulmonary disease) ([facility]     Edentulous     History of hepatitis C 2019    treated-2019    Hoarseness of voice     For OR 7-12-21    Mesothelioma ([facility]     Mouth sores     Tongue mass     left     Patient Active Problem List   Diagnosis    COPD (chronic obstructive pulmonary disease) ([facility]    Lumbar radiculopathy    Hepatitis C without hepatic coma    Tongue mass    Primary squamous cell carcinoma of head and neck (HCC)    Mouth sore secondary to chemotherapy        Past Surgical History:      Procedure Laterality Date    FRACTURE SURGERY      KNEE ARTHROSCOPY Left 1980    LARYNGOSCOPY N/A 7/12/2021    PANENDOSCOPY BRONCHOSCOPY ESOPHAGOSCOPY MICROSUSPENSION DIRECT LARYNGOSCOPY performed by Manny Mccormick DO at [facility] N/A 7/12/2021    BIOPSY OF TONGUE BASE WITH FROZEN SECTION AND LEFT TRUE VOCAL CORD BIOPSY performed by Manny Mccormick DO at [facility] OR       Family History:  Family History   Problem Relation Age of Onset    Cancer Father         lung       Medications:  Reviewed and reconciled.     Social History:  Social History     Socioeconomic History    Marital status: Single     Spouse name: Not on file    Number of children: 0    Years of education: Not on file    Highest education level: Not on file   Occupational History    Occupation: dairy compressor   Tobacco Use    Smoking status: Current Every Day Smoker     Packs/day: 0.50     Years: 45.00     Pack years: 22.50     Types: Cigarettes    Smokeless tobacco: Never Used   Vaping Use    Vaping Use: Never used   Substance and Sexual Activity    Alcohol use: Not Currently    Drug use: Never    Sexual activity: Not on file   Other Topics Concern    Not on file   Social History Narrative    Not on file     Social Determinants of Health     Financial Resource Strain:     Difficulty of Paying Living Expenses: Not on file   Food Insecurity:     Worried About Running Out of Food in the Last Year: Not on file    Harsh of Food in the Last Year: Not on file   Transportation Needs:     Lack of Transportation (Medical): Not on file    Lack of Transportation (Non-Medical): Not on file   Physical Activity:     Days of Exercise per Week: Not on file    Minutes of Exercise per Session: Not on file   Stress:     Feeling of Stress : Not on file   Social Connections:     Frequency of Communication with Friends and Family: Not on file    Frequency of Social Gatherings with Friends and Family: Not on file    Attends Taoist Services: Not on file    Active Member of [address] [place] [address] or Organizations: Not on file    Attends Club or Organization Meetings: Not on file    Marital Status: Not on file   Intimate Partner Violence:     Fear of Current or Ex-Partner: Not on file    Emotionally Abused: Not on file    Physically Abused: Not on file    Sexually Abused: Not on file   Housing Stability:     Unable to Pay for Housing in the Last Year: Not on file    Number of [place] in the Last Year: Not on file    Unstable Housing in the Last Year: Not on file       Allergies:  No Known Allergies    Physical Exam:  BP (!) 156/78 (Site: Right Upper Arm, Position: Sitting, Cuff Size: Medium Adult)   Pulse 75   Temp 97.3 °F (36.3 °C) (Infrared)   Resp 16   Ht 5' 7\" (1.702 m)   Wt 133 lb (60.3 kg)   SpO2 98%   BMI 20.83 kg/m²     GENERAL: Alert, oriented x 3, not in acute distress. HEENT: PERRLA; EOMI. Positive for trismus, improved motion of the tongue. NECK: Supple. No palpable lymph nodes. LUNGS: Good air entry bilaterally. No wheezing, crackles or rhonchi. CARDIOVASCULAR: Regular rate. No murmurs, rubs or gallops. ABDOMEN: Soft. Non-tender, non-distended. Positive bowel sounds. EXTREMITIES: Without clubbing, cyanosis, or edema. NEUROLOGIC: No focal deficits. ECOG PS 1    Impression/Plan:       Mr. Kota Aguilar is a pleasant 60-year-old gentleman, with a past medical history significant for tobacco use disorder, COPD, and hepatitis C status post treatment in 2019, who had presented with left oral tongue lesion, when he developed a left neck mass, he was treated with a short course of antibiotics, which did not help, he subsequently had a work-up done, including a CT of the neck which had revealed an abnormal mass in the lateral aspect of the oral tongue involving a significant portion of the tongue measuring 4 x 2 x 2.8 cm, a level 2A lymph node was also noted, The patient underwent a laryngoscopy on 7/19/2021 which revealed a benign-appearing polyp on the left true vocal cord but a mass involving the left tonsil fossa. This was biopsied and found to be a invasive squamous cell carcinoma p16-positive. PET/CT was done on 8/3/2021, revealing a masslike structure involving the left tongue extending to the posterior pharyngeal wall crossing the midline. There was also metabolic activity noted in a zone 3 node on the ipsilateral side, clinical stage [id number] disease. I discussed with the patient his diagnosis, prognosis, as well as treatment with definitive concurrent chemo/RT using weekly cisplatin, the side effects and the schedule of the treatment were reviewed with the patient. The patient had decided against having a PEG and port placed, referral was placed to palliative medicine for symptoms management.  The patient was started on 10/12/2021 on concurrent chemoradiation using weekly cisplatin, he completed radiation therapy on 12/6/2021, he was noncompliant with his treatments, he completed chemotherapy on 12/15/2021 the patient is doing well clinically, he had an examination with ENT on 12/21/2021, patient appeared to be decreasing in size, was less firm, PET scan was ordered to be done in mid March, continue follow-up with ENT. Anemia, treatment related, will continue to monitor his counts. RTC in 1 month. Thank you for allowing us to participate in the care of Mr. Ace Dunbar.     Avery Dunlap MD   HEMATOLOGY/MEDICAL [address] [place] [address] Rd MED ONCOLOGY  [address] [place] [id number]  Dept: 71 [facility]: [phone]

## 2022-01-12 ENCOUNTER — HOSPITAL ENCOUNTER (OUTPATIENT)
Dept: INFUSION THERAPY | Age: 67
Discharge: HOME | End: 2022-01-12
Payer: MEDICARE

## 2022-01-12 VITALS
RESPIRATION RATE: 16 BRPM | DIASTOLIC BLOOD PRESSURE: 72 MMHG | HEART RATE: 64 BPM | SYSTOLIC BLOOD PRESSURE: 178 MMHG | TEMPERATURE: 97.4 F

## 2022-01-12 PROCEDURE — 96360 HYDRATION IV INFUSION INIT: CPT

## 2022-01-12 PROCEDURE — 2580000003 HC RX 258

## 2022-01-12 RX ORDER — 0.9 % SODIUM CHLORIDE 0.9 %
1000 INTRAVENOUS SOLUTION INTRAVENOUS ONCE
Status: COMPLETED | OUTPATIENT
Start: 2022-01-12 | End: 2022-01-12

## 2022-01-12 RX ORDER — SODIUM CHLORIDE 9 MG/ML
INJECTION, SOLUTION INTRAVENOUS
Status: COMPLETED
Start: 2022-01-12 | End: 2022-01-12

## 2022-01-12 RX ADMIN — SODIUM CHLORIDE 1000 ML: 9 INJECTION, SOLUTION INTRAVENOUS at 13:36

## 2022-01-12 RX ADMIN — Medication 1000 ML: at 13:36

## 2022-01-19 ENCOUNTER — TELEPHONE (OUTPATIENT)
Dept: RADIATION ONCOLOGY | Age: 67
End: 2022-01-19

## 2022-01-20 ENCOUNTER — TELEPHONE (OUTPATIENT)
Dept: RADIATION ONCOLOGY | Age: 67
End: 2022-01-20

## 2022-01-20 NOTE — TELEPHONE ENCOUNTER
James Andre  1/20/2022  Wt Readings from Last 10 Encounters:   01/11/22 133 lb (60.3 kg)   12/30/21 133 lb 1.6 oz (60.4 kg)   12/21/21 140 lb (63.5 kg)   12/14/21 140 lb (63.5 kg)   12/02/21 144 lb (65.3 kg)   11/30/21 139 lb (63 kg)   11/24/21 145 lb 1.6 oz (65.8 kg)   11/18/21 147 lb 9.6 oz (67 kg)   11/16/21 147 lb 12.8 oz (67 kg)   11/10/21 149 lb 8 oz (67.8 kg)     Ht Readings from Last 1 Encounters:   01/11/22 5' 7\" (1.702 m)     Called pt today per pt request. Pt reported that his intake remains sub-optimal d/t ongoing odynphagia. He continues to use MMW and can tolerate soft foods as well as liquids. Discussed importance of meeting nutritional needs to help with healing process after treatment. Encouraged additional Boost intake if he is unable to progress meals. Will mail pt Boost coupons and this clinician's contact information. He denied any additional questions or concerns at this time. Will meet with pt at his follow up next week and pt was encouraged to reach out if any additional questions or concerns should arise.      Weight change: 26% wt loss in 4 months  Appetite: fair  N/V/D/C: none  Calculated Needs if applicable: 8093-1730 kcal (32-35 kcal/kg),  gm protein (1.5-1.8 gm/kg), 9206-8744 ml FW (1 ml/kcal)     Recommendations: Pt is to consume at least 6 Boost Plus/small meals daily with high calorie/protein foods at every meal.      ASPEN GUIDELINES FOR CLINICAL CHARACTERISTICS OF MALNUTRITION IN CHRONIC ILLNESS     Moderate Malnutrition  Severe Malnutrition    Energy intake  <75% energy intake compared to estimated needs for >1month <75% energy intake compared to estimated needs for >1month   Weight changes  5% x 1 month  7.5% x 3 months   10% x 6 months   20% x 1 year  >5% x 1 month  >7.5% x 3 months   >10% x 6 months   >20% x 1 year    Physical findings  Mild   Decrease subcutaneous fat    Decrease muscle mass     Increase fluid/edema   Severe  Decrease subcutaneous fat    Decrease muscle mass     Increase fluid/edema    Pt with ongoing severe malnutrition AEB ongoing wt loss and <75% intake.      Rossy Pollard, NENA, LD

## 2022-01-24 ENCOUNTER — TELEPHONE (OUTPATIENT)
Dept: ONCOLOGY | Age: 67
End: 2022-01-24

## 2022-01-24 ENCOUNTER — TELEPHONE (OUTPATIENT)
Dept: RADIATION ONCOLOGY | Age: 67
End: 2022-01-24

## 2022-01-24 NOTE — TELEPHONE ENCOUNTER
Patient called the office spoke to Lamberto re: need a refill for MMW. This RN called [facility]. Emilia saw a note from Med/Onc re: this request.  I then called Med/Onc spoke to BETH Wolfe and states she took care of it already, it should be available in the pharmacy by this afternoon. I then called Darrius Donato to inform him, he asked for [employer] # which I provided, he appreciated it.

## 2022-01-24 NOTE — TELEPHONE ENCOUNTER
Patient called requesting his magic mouthwash be refilled but needed it sent to the [email or web address] in [facility] on [facility]. Information of request given to Denisa Serrano NP who states that she will complete.

## 2022-02-14 ENCOUNTER — TELEPHONE (OUTPATIENT)
Dept: ENT CLINIC | Age: 67
End: 2022-02-14

## 2022-02-14 NOTE — TELEPHONE ENCOUNTER
Patient rescheduled for 3/8/22    Electronically signed by Spencer Bella MA on 2/14/22 at 10:55 AM EST

## 2022-02-14 NOTE — TELEPHONE ENCOUNTER
Pt called in to schedule an appt for after his PET scan. The scan is scheduled for 2/28/22. Please, advise on scheduling. Rod Presser can be reached at [phone]. Thank you.

## 2022-03-09 NOTE — TELEPHONE ENCOUNTER
Patients pet scan get scheduled for 3/28 and he needs to schedule a follow up with Dr Akbar Corona.  Please contact the patient to schedule a follow up

## 2022-04-06 ENCOUNTER — TELEPHONE (OUTPATIENT)
Dept: ADMINISTRATIVE | Age: 67
End: 2022-04-06

## 2022-04-06 DIAGNOSIS — C76.0 PRIMARY SQUAMOUS CELL CARCINOMA OF HEAD AND NECK (HCC): Primary | ICD-10-CM

## 2022-04-06 NOTE — TELEPHONE ENCOUNTER
Please advise rescheduling 4/6 PET scan result appointment. Patient PET scan is on 5/26.      Patient can be reached at new phone number [phone]

## 2022-05-02 ENCOUNTER — TELEPHONE (OUTPATIENT)
Dept: ONCOLOGY | Age: 67
End: 2022-05-02

## 2022-05-02 NOTE — TELEPHONE ENCOUNTER
Jamaica Delgado from [facility] called and requested Tylor's most recent office visit. I faxed the progress note over to [phone] and received a confirmation of OK.

## 2022-08-15 ENCOUNTER — HOSPITAL ENCOUNTER (OUTPATIENT)
Dept: PET IMAGING | Age: 67
Discharge: HOME | End: 2022-08-17
Payer: MEDICARE

## 2022-08-15 ENCOUNTER — APPOINTMENT (OUTPATIENT)
Dept: PET IMAGING | Age: 67
End: 2022-08-15
Payer: MEDICARE

## 2022-08-15 DIAGNOSIS — C76.0 PRIMARY SQUAMOUS CELL CARCINOMA OF HEAD AND NECK (HCC): ICD-10-CM

## 2022-08-15 LAB — METER GLUCOSE: 81 MG/DL (ref 74–99)

## 2022-08-15 PROCEDURE — 78815 PET IMAGE W/CT SKULL-THIGH: CPT

## 2022-08-15 PROCEDURE — 82962 GLUCOSE BLOOD TEST: CPT

## 2022-08-15 PROCEDURE — 3430000000 HC RX DIAGNOSTIC RADIOPHARMACEUTICAL: Performed by: RADIOLOGY

## 2022-08-15 PROCEDURE — A9552 F18 FDG: HCPCS | Performed by: RADIOLOGY

## 2022-08-15 RX ORDER — FLUDEOXYGLUCOSE F 18 200 MCI/ML
15 INJECTION, SOLUTION INTRAVENOUS
Status: COMPLETED | OUTPATIENT
Start: 2022-08-15 | End: 2022-08-15

## 2022-08-15 RX ADMIN — FLUDEOXYGLUCOSE F 18 15 MILLICURIE: 200 INJECTION, SOLUTION INTRAVENOUS at 10:43

## 2022-09-30 ENCOUNTER — TELEPHONE (OUTPATIENT)
Dept: ONCOLOGY | Age: 67
End: 2022-09-30

## 2022-09-30 NOTE — TELEPHONE ENCOUNTER
Patient left message to cancel today's, 09/30/22, follow up appointment with Dr. Yehuda Champagne. Called patient and received voice mail. Left message for patient to call our office at his earliest convenience to reschedule cancelled appointment with Dr. Yehuda Champagne.

## 2022-12-16 ENCOUNTER — TELEPHONE (OUTPATIENT)
Dept: CASE MANAGEMENT | Age: 67
End: 2022-12-16

## 2022-12-16 NOTE — TELEPHONE ENCOUNTER
Received a call from [facility] with Dr Allen Smoker office regarding patient. He is scheduled to see them today with some issues with soreness of his gums. They requested records of Radiation treatments received including sites and dates treated. Follow up note including treatment summary faxed to their office at [phone].

## (undated) DEVICE — 4-PORT MANIFOLD: Brand: NEPTUNE 2

## (undated) DEVICE — LIGHT SOURCE WHT

## (undated) DEVICE — SET T AND A PASCOLINI

## (undated) DEVICE — LIGHT SOURCE BL

## (undated) DEVICE — FILTER PAPER CASSETTE BIOP PLSTC PNK HNGD

## (undated) DEVICE — SUCTION MICROLARYNGEAL SHRT

## (undated) DEVICE — SET FORCEP MICROFRANCE LARYNGEAL

## (undated) DEVICE — SUCTION VELVET EYE SHRT

## (undated) DEVICE — SURGICAL PROCEDURE PACK EENT CUST

## (undated) DEVICE — ENDOTRACH TUBE 7060450 LASER SHLD 7.5MM: Brand: LASER-SHIELD®

## (undated) DEVICE — Device

## (undated) DEVICE — MARKER,SKIN,WI/RULER AND LABELS: Brand: MEDLINE

## (undated) DEVICE — CORD FIBEROPTIC ENT

## (undated) DEVICE — SUCTION LARYNGEAL OPEN ENDED SHRT

## (undated) DEVICE — GLOVE ORANGE PI 8 1/2   MSG9085

## (undated) DEVICE — COAGULATOR SUCT 10FR LAIN FTSWCH ACTIVATION DISP VALLEYLAB

## (undated) DEVICE — SOLUTION IV IRRIG 500ML 0.9% SODIUM CHL 2F7123

## (undated) DEVICE — KIT,ANTI FOG,W/SPONGE & FLUID,SOFT PACK: Brand: MEDLINE

## (undated) DEVICE — TOWEL,OR,DSP,ST,BLUE,STD,6/PK,12PK/CS: Brand: MEDLINE

## (undated) DEVICE — COVER HNDL LT DISP

## (undated) DEVICE — ENDOTRACH TUBE 7060300 LASER SHLD 6MM: Brand: LASER-SHIELD®

## (undated) DEVICE — HOLDERS JAKO

## (undated) DEVICE — BASIC SINGLE BASIN 1-LF: Brand: MEDLINE INDUSTRIES, INC.

## (undated) DEVICE — PACK PROCEDURE SURG GEN CUST

## (undated) DEVICE — PUNCH TONSIL / ADENOID

## (undated) DEVICE — GUARD TEETH AD PR NYL